# Patient Record
Sex: FEMALE | Race: BLACK OR AFRICAN AMERICAN | NOT HISPANIC OR LATINO | Employment: UNEMPLOYED | URBAN - METROPOLITAN AREA
[De-identification: names, ages, dates, MRNs, and addresses within clinical notes are randomized per-mention and may not be internally consistent; named-entity substitution may affect disease eponyms.]

---

## 2022-02-26 LAB
ALBUMIN: 3.5 G/DL (ref 3.4–5)
ALP BLD-CCNC: 109 U/L (ref 33–110)
ALT SERPL-CCNC: 15 U/L (ref 7–45)
ANION GAP SERPL CALCULATED.3IONS-SCNC: 13 MMOL/L (ref 10–20)
AST SERPL-CCNC: 14 U/L (ref 9–39)
BICARBONATE: 23 MMOL/L (ref 21–32)
BILIRUB SERPL-MCNC: 0.4 MG/DL (ref 0–1.2)
CALCIUM SERPL-MCNC: 9.7 MG/DL (ref 8.6–10.6)
CHLORIDE BLD-SCNC: 112 MMOL/L (ref 98–107)
CREAT SERPL-MCNC: 0.65 MG/DL (ref 0.5–1)
EGFR FEMALE: >90 ML/MIN/1.73M2
ERYTHROCYTE [DISTWIDTH] IN BLOOD BY AUTOMATED COUNT: 14 % (ref 11.5–14)
GLUCOSE: 99 MG/DL (ref 74–99)
HCT VFR BLD CALC: 29.6 % (ref 36–46)
HEMOGLOBIN: 8.5 G/DL (ref 12–16)
MCHC RBC AUTO-ENTMCNC: 28.7 G/DL (ref 32–36)
MCV RBC AUTO: 88 FL (ref 80–100)
NUCLEATED RBCS: 0 /100 WBC (ref 0–0)
PLATELET # BLD: 325 X10E9/L (ref 150–450)
POTASSIUM SERPL-SCNC: 4.1 MMOL/L (ref 3.5–5.3)
RBC # BLD: 3.35 X10E12/L (ref 4–5.2)
SODIUM BLD-SCNC: 144 MMOL/L (ref 136–145)
TOTAL PROTEIN: 6.2 G/DL (ref 6.4–8.2)
UREA NITROGEN: 16 MG/DL (ref 6–23)
WBC: 6.3 X10E9/L (ref 4.4–11.3)

## 2022-03-03 LAB
ANION GAP SERPL CALCULATED.3IONS-SCNC: 13 MMOL/L (ref 10–20)
BICARBONATE: 23 MMOL/L (ref 21–32)
CALCIUM SERPL-MCNC: 9.3 MG/DL (ref 8.6–10.3)
CHLORIDE BLD-SCNC: 109 MMOL/L (ref 98–107)
CREAT SERPL-MCNC: 0.82 MG/DL (ref 0.5–1)
EGFR FEMALE: 83 ML/MIN/1.73M2
ERYTHROCYTE [DISTWIDTH] IN BLOOD BY AUTOMATED COUNT: 14.5 % (ref 11.5–14)
GLUCOSE: 83 MG/DL (ref 74–99)
HCT VFR BLD CALC: 32.2 % (ref 36–46)
HEMOGLOBIN: 9.1 G/DL (ref 12–16)
MCHC RBC AUTO-ENTMCNC: 28.3 G/DL (ref 32–36)
MCV RBC AUTO: 87 FL (ref 80–100)
PLATELET # BLD: 273 X10E9/L (ref 150–450)
POTASSIUM SERPL-SCNC: 4.2 MMOL/L (ref 3.5–5.3)
RBC # BLD: 3.71 X10E12/L (ref 4–5.2)
SODIUM BLD-SCNC: 141 MMOL/L (ref 136–145)
UREA NITROGEN: 19 MG/DL (ref 6–23)
WBC: 5.6 X10E9/L (ref 4.4–11.3)

## 2023-01-23 PROBLEM — L50.8 CHRONIC URTICARIA: Status: ACTIVE | Noted: 2023-01-23

## 2023-01-23 PROBLEM — S82.872S: Status: ACTIVE | Noted: 2023-01-23

## 2023-01-23 PROBLEM — H52.03 HYPEROPIA OF BOTH EYES: Status: ACTIVE | Noted: 2023-01-23

## 2023-01-23 PROBLEM — M51.9 LUMBAR DISC DISEASE: Status: ACTIVE | Noted: 2023-01-23

## 2023-01-23 PROBLEM — M54.12 CERVICAL RADICULOPATHY: Status: ACTIVE | Noted: 2023-01-23

## 2023-01-23 PROBLEM — M89.8X1 CHRONIC SCAPULAR PAIN: Status: ACTIVE | Noted: 2023-01-23

## 2023-01-23 PROBLEM — F17.200 NICOTINE DEPENDENCE: Status: ACTIVE | Noted: 2023-01-23

## 2023-01-23 PROBLEM — G35 MULTIPLE SCLEROSIS (MULTI): Status: ACTIVE | Noted: 2023-01-23

## 2023-01-23 PROBLEM — M54.9 UPPER BACK PAIN ON LEFT SIDE: Status: ACTIVE | Noted: 2023-01-23

## 2023-01-23 PROBLEM — N30.00 ACUTE CYSTITIS: Status: ACTIVE | Noted: 2023-01-23

## 2023-01-23 PROBLEM — I63.532: Status: ACTIVE | Noted: 2023-01-23

## 2023-01-23 PROBLEM — R32 URINARY INCONTINENCE IN FEMALE: Status: ACTIVE | Noted: 2023-01-23

## 2023-01-23 PROBLEM — N39.0 UTI (URINARY TRACT INFECTION): Status: ACTIVE | Noted: 2023-01-23

## 2023-01-23 PROBLEM — I33.0: Status: ACTIVE | Noted: 2023-01-23

## 2023-01-23 PROBLEM — Z86.73 H/O: CVA (CEREBROVASCULAR ACCIDENT): Status: ACTIVE | Noted: 2023-01-23

## 2023-01-23 PROBLEM — M54.16 LUMBAR RADICULITIS: Status: ACTIVE | Noted: 2023-01-23

## 2023-01-23 PROBLEM — H52.02 HYPERMETROPIA OF LEFT EYE: Status: ACTIVE | Noted: 2023-01-23

## 2023-01-23 PROBLEM — E78.01 FAMILIAL HYPERCHOLESTEREMIA: Status: ACTIVE | Noted: 2023-01-23

## 2023-01-23 PROBLEM — F32.1 MAJOR DEPRESSIVE DISORDER, SINGLE EPISODE, MODERATE (MULTI): Status: ACTIVE | Noted: 2023-01-23

## 2023-01-23 PROBLEM — M54.2 NECK PAIN: Status: ACTIVE | Noted: 2023-01-23

## 2023-01-23 PROBLEM — T81.89XA SURGICAL WOUND, NON HEALING: Status: ACTIVE | Noted: 2023-01-23

## 2023-01-23 PROBLEM — F32.A DEPRESSION: Status: ACTIVE | Noted: 2023-01-23

## 2023-01-23 PROBLEM — R10.30 GROIN PAIN: Status: ACTIVE | Noted: 2023-01-23

## 2023-01-23 PROBLEM — S82.839D: Status: ACTIVE | Noted: 2023-01-23

## 2023-01-23 PROBLEM — M48.02 CERVICAL STENOSIS OF SPINAL CANAL: Status: ACTIVE | Noted: 2023-01-23

## 2023-01-23 PROBLEM — Z98.1 S/P CERVICAL SPINAL FUSION: Status: ACTIVE | Noted: 2023-01-23

## 2023-01-23 PROBLEM — S82.873D: Status: ACTIVE | Noted: 2023-01-23

## 2023-01-23 PROBLEM — R43.8 METALLIC TASTE: Status: ACTIVE | Noted: 2023-01-23

## 2023-01-23 PROBLEM — M79.662 PAIN OF LEFT CALF: Status: ACTIVE | Noted: 2023-01-23

## 2023-01-23 PROBLEM — G89.29 CHRONIC PAIN: Status: ACTIVE | Noted: 2023-01-23

## 2023-01-23 PROBLEM — R39.9 UTI SYMPTOMS: Status: ACTIVE | Noted: 2023-01-23

## 2023-01-23 PROBLEM — J34.2 NASAL SEPTAL DEVIATION: Status: ACTIVE | Noted: 2023-01-23

## 2023-01-23 PROBLEM — M43.02 CERVICAL SPONDYLOLYSIS: Status: ACTIVE | Noted: 2023-01-23

## 2023-01-23 PROBLEM — G47.9 SLEEP DIFFICULTIES: Status: ACTIVE | Noted: 2023-01-23

## 2023-01-23 PROBLEM — M25.60 JOINT STIFFNESS: Status: ACTIVE | Noted: 2023-01-23

## 2023-01-23 PROBLEM — F41.9 ANXIETY: Status: ACTIVE | Noted: 2023-01-23

## 2023-01-23 PROBLEM — G89.29 CHRONIC SCAPULAR PAIN: Status: ACTIVE | Noted: 2023-01-23

## 2023-01-23 PROBLEM — M79.671 RIGHT FOOT PAIN: Status: ACTIVE | Noted: 2023-01-23

## 2023-01-23 PROBLEM — M21.6X1 PRONATION OF BOTH FEET: Status: ACTIVE | Noted: 2023-01-23

## 2023-01-23 PROBLEM — R29.898 WEAKNESS OF BOTH LOWER EXTREMITIES: Status: ACTIVE | Noted: 2023-01-23

## 2023-01-23 PROBLEM — J34.3 HYPERTROPHY OF BOTH INFERIOR NASAL TURBINATES: Status: ACTIVE | Noted: 2023-01-23

## 2023-01-23 PROBLEM — R51.9 HEADACHE: Status: ACTIVE | Noted: 2023-01-23

## 2023-01-23 PROBLEM — R06.02 SHORTNESS OF BREATH: Status: ACTIVE | Noted: 2023-01-23

## 2023-01-23 PROBLEM — G47.00 INSOMNIA: Status: ACTIVE | Noted: 2023-01-23

## 2023-01-23 PROBLEM — M25.512 LEFT SHOULDER PAIN: Status: ACTIVE | Noted: 2023-01-23

## 2023-01-23 PROBLEM — G43.119 MIGRAINE WITH AURA, INTRACTABLE: Status: ACTIVE | Noted: 2023-01-23

## 2023-01-23 PROBLEM — B95.8: Status: ACTIVE | Noted: 2023-01-23

## 2023-01-23 PROBLEM — J30.2 SEASONAL ALLERGIC RHINITIS: Status: ACTIVE | Noted: 2023-01-23

## 2023-01-23 PROBLEM — J34.89 NASAL DRAINAGE: Status: ACTIVE | Noted: 2023-01-23

## 2023-01-23 PROBLEM — M21.6X2 PRONATION OF BOTH FEET: Status: ACTIVE | Noted: 2023-01-23

## 2023-01-23 PROBLEM — B37.0 THRUSH, ORAL: Status: ACTIVE | Noted: 2023-01-23

## 2023-01-23 PROBLEM — E78.49 FAMILIAL HYPERLIPIDEMIA: Status: ACTIVE | Noted: 2023-01-23

## 2023-01-23 PROBLEM — M75.00 FROZEN SHOULDER SYNDROME: Status: ACTIVE | Noted: 2023-01-23

## 2023-01-23 PROBLEM — E55.9 VITAMIN D DEFICIENCY: Status: ACTIVE | Noted: 2023-01-23

## 2023-01-23 PROBLEM — J45.909 ASTHMA (HHS-HCC): Status: ACTIVE | Noted: 2023-01-23

## 2023-01-23 PROBLEM — M50.00 CERVICAL DISC DISORDER WITH MYELOPATHY: Status: ACTIVE | Noted: 2023-01-23

## 2023-01-23 PROBLEM — R53.1 GENERALIZED WEAKNESS: Status: ACTIVE | Noted: 2023-01-23

## 2023-01-23 PROBLEM — R53.83 FATIGUE: Status: ACTIVE | Noted: 2023-01-23

## 2023-01-23 PROBLEM — J18.9 CAP (COMMUNITY ACQUIRED PNEUMONIA): Status: ACTIVE | Noted: 2023-01-23

## 2023-01-23 PROBLEM — R19.7 DIARRHEA, UNSPECIFIED: Status: ACTIVE | Noted: 2023-01-23

## 2023-01-23 PROBLEM — K59.09 CHRONIC CONSTIPATION: Status: ACTIVE | Noted: 2023-01-23

## 2023-01-23 PROBLEM — R07.9 CHEST PAIN: Status: ACTIVE | Noted: 2023-01-23

## 2023-01-23 PROBLEM — M40.202 CERVICAL KYPHOSIS: Status: ACTIVE | Noted: 2023-01-23

## 2023-01-23 PROBLEM — H04.129 DRY EYE: Status: ACTIVE | Noted: 2023-01-23

## 2023-01-23 PROBLEM — B02.39 OTHER HERPES ZOSTER EYE DISEASE: Status: ACTIVE | Noted: 2023-01-23

## 2023-01-23 PROBLEM — M25.572 LEFT ANKLE PAIN: Status: ACTIVE | Noted: 2023-01-23

## 2023-01-23 PROBLEM — J32.4 CHRONIC PANSINUSITIS: Status: ACTIVE | Noted: 2023-01-23

## 2023-01-23 PROBLEM — F31.9 BIPOLAR DEPRESSION (MULTI): Status: ACTIVE | Noted: 2023-01-23

## 2023-01-23 PROBLEM — M76.71 PERONEAL TENDINITIS OF RIGHT LOWER EXTREMITY: Status: ACTIVE | Noted: 2023-01-23

## 2023-01-23 RX ORDER — MINERAL OIL
1 ENEMA (ML) RECTAL DAILY
COMMUNITY
Start: 2018-09-13 | End: 2023-06-23

## 2023-01-23 RX ORDER — LEVETIRACETAM 500 MG/1
1 TABLET ORAL 2 TIMES DAILY
COMMUNITY
Start: 2020-08-13 | End: 2023-10-27 | Stop reason: SDUPTHER

## 2023-01-23 RX ORDER — ESCITALOPRAM OXALATE 10 MG/1
1 TABLET ORAL DAILY
COMMUNITY
Start: 2022-04-15 | End: 2023-04-27

## 2023-01-23 RX ORDER — SENNOSIDES 8.6 MG/1
1 TABLET ORAL AS NEEDED
COMMUNITY
Start: 2022-03-11

## 2023-01-23 RX ORDER — DOCUSATE SODIUM 100 MG/1
100 CAPSULE, LIQUID FILLED ORAL 2 TIMES DAILY
COMMUNITY
Start: 2021-11-30

## 2023-01-23 RX ORDER — DIMETHYL FUMARATE 240 MG/1
240 CAPSULE ORAL 2 TIMES DAILY
COMMUNITY
Start: 2022-03-01 | End: 2023-10-04 | Stop reason: WASHOUT

## 2023-01-23 RX ORDER — ASPIRIN 81 MG/1
TABLET ORAL
Status: ON HOLD | COMMUNITY
Start: 2022-02-22 | End: 2024-02-08 | Stop reason: SDUPTHER

## 2023-01-23 RX ORDER — FAMOTIDINE 20 MG/1
1 TABLET, FILM COATED ORAL EVERY 12 HOURS
COMMUNITY
Start: 2022-08-18 | End: 2023-10-27

## 2023-01-23 RX ORDER — TOPIRAMATE 100 MG/1
1 TABLET, FILM COATED ORAL 2 TIMES DAILY
COMMUNITY
Start: 2020-09-14 | End: 2023-11-10

## 2023-01-23 RX ORDER — NITROGLYCERIN 0.4 MG/1
0.4 TABLET SUBLINGUAL
COMMUNITY
Start: 2021-05-19

## 2023-01-23 RX ORDER — DM/P-EPHED/ACETAMINOPH/DOXYLAM 30-7.5/3
2 LIQUID (ML) ORAL
COMMUNITY
Start: 2021-11-01 | End: 2023-11-20 | Stop reason: ALTCHOICE

## 2023-01-23 RX ORDER — GABAPENTIN 400 MG/1
CAPSULE ORAL DAILY
COMMUNITY
Start: 2021-02-18 | End: 2023-04-27

## 2023-01-23 RX ORDER — BUDESONIDE AND FORMOTEROL FUMARATE DIHYDRATE 160; 4.5 UG/1; UG/1
2 AEROSOL RESPIRATORY (INHALATION) 2 TIMES DAILY
COMMUNITY
Start: 2021-10-29 | End: 2023-06-27 | Stop reason: ALTCHOICE

## 2023-01-23 RX ORDER — SUMATRIPTAN 50 MG/1
50 TABLET, FILM COATED ORAL ONCE AS NEEDED
COMMUNITY
End: 2023-10-30

## 2023-01-23 RX ORDER — ALBUTEROL SULFATE 90 UG/1
AEROSOL, METERED RESPIRATORY (INHALATION)
COMMUNITY
Start: 2021-06-04

## 2023-01-23 RX ORDER — ATORVASTATIN CALCIUM 80 MG/1
1 TABLET, FILM COATED ORAL NIGHTLY
COMMUNITY
Start: 2021-03-31 | End: 2023-06-27 | Stop reason: SDUPTHER

## 2023-01-23 RX ORDER — AZELASTINE 1 MG/ML
2 SPRAY, METERED NASAL 2 TIMES DAILY
COMMUNITY
Start: 2019-09-25

## 2023-03-07 ENCOUNTER — TELEMEDICINE (OUTPATIENT)
Dept: PRIMARY CARE | Facility: CLINIC | Age: 58
End: 2023-03-07
Payer: COMMERCIAL

## 2023-03-07 DIAGNOSIS — E66.9 CLASS 1 OBESITY WITHOUT SERIOUS COMORBIDITY WITH BODY MASS INDEX (BMI) OF 31.0 TO 31.9 IN ADULT, UNSPECIFIED OBESITY TYPE: Primary | ICD-10-CM

## 2023-03-07 PROCEDURE — 99213 OFFICE O/P EST LOW 20 MIN: CPT | Performed by: STUDENT IN AN ORGANIZED HEALTH CARE EDUCATION/TRAINING PROGRAM

## 2023-03-07 NOTE — PROGRESS NOTES
I reviewed with the resident the medical history and the resident’s findings on physical examination.  I discussed with the resident the patient’s diagnosis and concur with the treatment plan as documented in the resident note.     Jesenia Em MD

## 2023-03-07 NOTE — PROGRESS NOTES
The encounter below was completed via telephone communication during the declared COVID-19 emergency. Video telemedicine was attempted, but patient lacked appropriate connectivity for that modality. Consent for treatment and billing for this visit was obtained during the visit. Patient was appropriately identified using two identifiers.    Jennifer Reddy is a 56yo with prediabetes, Relapsing-Remitting MS (diagnosed 2010), LPCA Stroke in 03/2021 (on ASA), fibromyalgia, depression, history of substance use disorder (sober since November 2012), lumbosacral radiculitis, cervical disc displacement and cervical kyphosis, radiculopathy, spondylolysis, s/p cervical fusion with neurosurgery (Dr. Familia Stock) on 2/27/2020, GTC x2 on Keppra (Last in 07/2020), COVID in 05/22 who presents to the clinic for management of weight loss after initiation of ozempic. Occasional nausea but otherwise tolerating ozempic well. Denies diarrhea, emesis, constipation, abdominal pain.     REVIEW OF SYSTEMS:   No fevers, chills  No sores, ulcers, rashes, skin lesions  No HA, SZ, syncope, stroke, TIA  No CP, chest pressure  No cough, SOB  No ABD pain, vomiting. +Nausea   No urinary urgency, dysuria, urinary frequency  No BRBPR, melena, hematuria  No bleeding  No edema, no calf pain    PHYSICAL EXAMINATION: (telephone visit)  Gen: No acute distress  Neuro: Alert and oriented  Resp: Speaks in normal tone and phonating normally in full sentences without pauses  Psych: Mood and affect appropriate. Intact judgment and insight.    Assessment and Plan:    Jennifer Reddy is a 56yo with prediabetes, Relapsing-Remitting MS (diagnosed 2010), LPCA Stroke in 03/2021 (on ASA), fibromyalgia, depression, history of substance use disorder (sober since November 2012), lumbosacral radiculitis, cervical disc displacement and cervical kyphosis, radiculopathy, spondylolysis, s/p cervical fusion with neurosurgery (Dr. Familia Stock) on 2/27/2020, GTC x2 on  Keppra (Last in 07/2020), COVID in 05/22 who presents to the clinic for management of weight loss after initiation of ozempic.      #Obesity   #Prediabetes   -Rx GLP-1 agonist -Ozempic 1 mg once weekly. Plan to up titrate to max dose of 2.4 mg per protocol.  -Patient denies history of pancreatitis, personal family history of MTC, MEN 2, suicidal behavior or ideation.  -Discussed possible adverse effects. Patient agreeable to pursue treatment with semaglutide.  -Patient to continue lifestyle modification including diet and exercise.  -RTC in 4 weeks for follow up  -Labs: RFP      RTC in 4 weeks for follow up     Staffed with Dr. Manav Stokes MD  Family Medicine, PGY 2  [facility]

## 2023-03-13 DIAGNOSIS — E66.9 CLASS 1 OBESITY WITHOUT SERIOUS COMORBIDITY WITH BODY MASS INDEX (BMI) OF 31.0 TO 31.9 IN ADULT, UNSPECIFIED OBESITY TYPE: Primary | ICD-10-CM

## 2023-03-13 RX ORDER — SEMAGLUTIDE 1.34 MG/ML
1 INJECTION, SOLUTION SUBCUTANEOUS
COMMUNITY
Start: 2023-02-10 | End: 2023-03-13 | Stop reason: DRUGHIGH

## 2023-03-13 RX ORDER — SEMAGLUTIDE 1.34 MG/ML
1 INJECTION, SOLUTION SUBCUTANEOUS
Qty: 2 EACH | Refills: 12 | Status: SHIPPED | OUTPATIENT
Start: 2023-03-13 | End: 2023-10-27

## 2023-03-14 ENCOUNTER — LAB (OUTPATIENT)
Dept: LAB | Facility: LAB | Age: 58
End: 2023-03-14
Payer: COMMERCIAL

## 2023-03-14 ENCOUNTER — TELEPHONE (OUTPATIENT)
Dept: PRIMARY CARE | Facility: CLINIC | Age: 58
End: 2023-03-14

## 2023-03-14 DIAGNOSIS — E66.9 CLASS 1 OBESITY WITHOUT SERIOUS COMORBIDITY WITH BODY MASS INDEX (BMI) OF 31.0 TO 31.9 IN ADULT, UNSPECIFIED OBESITY TYPE: ICD-10-CM

## 2023-03-14 LAB
ALBUMIN (G/DL) IN SER/PLAS: 4.5 G/DL (ref 3.4–5)
ANION GAP IN SER/PLAS: 11 MMOL/L (ref 10–20)
CALCIUM (MG/DL) IN SER/PLAS: 9.7 MG/DL (ref 8.6–10.6)
CARBON DIOXIDE, TOTAL (MMOL/L) IN SER/PLAS: 25 MMOL/L (ref 21–32)
CHLORIDE (MMOL/L) IN SER/PLAS: 107 MMOL/L (ref 98–107)
CREATININE (MG/DL) IN SER/PLAS: 0.79 MG/DL (ref 0.5–1.05)
GFR FEMALE: 87 ML/MIN/1.73M2
GLUCOSE (MG/DL) IN SER/PLAS: 77 MG/DL (ref 74–99)
PHOSPHATE (MG/DL) IN SER/PLAS: 4.6 MG/DL (ref 2.5–4.9)
POTASSIUM (MMOL/L) IN SER/PLAS: 4 MMOL/L (ref 3.5–5.3)
SODIUM (MMOL/L) IN SER/PLAS: 139 MMOL/L (ref 136–145)
UREA NITROGEN (MG/DL) IN SER/PLAS: 18 MG/DL (ref 6–23)

## 2023-03-14 PROCEDURE — 80069 RENAL FUNCTION PANEL: CPT

## 2023-03-14 PROCEDURE — 36415 COLL VENOUS BLD VENIPUNCTURE: CPT

## 2023-03-14 NOTE — TELEPHONE ENCOUNTER
Please contact patient regarding prior authorization for Ozempic 1 MG. Contact patient when this is completed. FYI. Patient advised to tell you she completed her Lab work today. Thanks. Froilan

## 2023-03-22 ENCOUNTER — TELEPHONE (OUTPATIENT)
Dept: PRIMARY CARE | Facility: CLINIC | Age: 58
End: 2023-03-22
Payer: COMMERCIAL

## 2023-03-22 NOTE — TELEPHONE ENCOUNTER
----- Message from Joaquim Carlisle sent at 3/22/2023  2:30 PM EDT -----  Regarding: FW: Med Request  Patient called back and states that the pharmacy is still waiting on a prior authorization  ----- Message -----  From: Joaquim Carlisle  Sent: 3/20/2023   9:18 AM EDT  To: [facility] [address] [place] [address] Clinical Support Staff  Subject: Med Request                                      Patient states that her insurance and pharmacy on file is requesting a prior authorization for Ozempic 1mg. Patient states that she's all out

## 2023-03-24 NOTE — TELEPHONE ENCOUNTER
Called and spoke to patient regarding the Ozempic prior authorization. After speaking to the pharmacy and insurance, it was found that another medication would have to be ordered and tried first before Ozempic would be covered. Dr. Stokes made aware. Patient was informed that a different medication will be called to the pharmacy.

## 2023-03-31 ENCOUNTER — OFFICE VISIT (OUTPATIENT)
Dept: PRIMARY CARE | Facility: CLINIC | Age: 58
End: 2023-03-31
Payer: COMMERCIAL

## 2023-03-31 VITALS
OXYGEN SATURATION: 100 % | SYSTOLIC BLOOD PRESSURE: 139 MMHG | DIASTOLIC BLOOD PRESSURE: 84 MMHG | TEMPERATURE: 97.6 F | BODY MASS INDEX: 32.02 KG/M2 | WEIGHT: 204 LBS | HEIGHT: 67 IN | HEART RATE: 80 BPM

## 2023-03-31 DIAGNOSIS — E66.9 CLASS 1 OBESITY WITHOUT SERIOUS COMORBIDITY WITH BODY MASS INDEX (BMI) OF 31.0 TO 31.9 IN ADULT, UNSPECIFIED OBESITY TYPE: Primary | ICD-10-CM

## 2023-03-31 DIAGNOSIS — R73.03 PREDIABETES: ICD-10-CM

## 2023-03-31 PROCEDURE — 99213 OFFICE O/P EST LOW 20 MIN: CPT | Performed by: STUDENT IN AN ORGANIZED HEALTH CARE EDUCATION/TRAINING PROGRAM

## 2023-03-31 PROCEDURE — 3008F BODY MASS INDEX DOCD: CPT | Performed by: STUDENT IN AN ORGANIZED HEALTH CARE EDUCATION/TRAINING PROGRAM

## 2023-03-31 RX ORDER — METFORMIN HYDROCHLORIDE 500 MG/1
500 TABLET ORAL
Qty: 30 TABLET | Refills: 11 | Status: SHIPPED | OUTPATIENT
Start: 2023-03-31 | End: 2023-04-28

## 2023-03-31 ASSESSMENT — PAIN SCALES - GENERAL: PAINLEVEL: 8

## 2023-03-31 NOTE — PROGRESS NOTES
"Subjective   Patient ID: Una Gallegos is a 57 y.o. female who presents for Follow-up.    HPI     Remitting MS (diagnosed 2010), LPCA Stroke in 03/2021 (on ASA), fibromyalgia, depression, history of substance use disorder (sober since November 2012), lumbosacral radiculitis, cervical disc displacement and cervical kyphosis, radiculopathy, spondylolysis, s/p cervical fusion with neurosurgery (Dr. Familia Stock) on 2/27/2020, GTC x2 on Keppra (Last in 07/2020), COVID in 05/22 who presents to the clinic for management of weight loss after initiation of ozempic.  Unfortunately, Ozempic is not covered by insurance and prior authorization was denied.    Review of Systems  No fevers, chills  No sores, ulcers, rashes, skin lesions  No HA, SZ, syncope, stroke, TIA  No CP, chest pressure  No cough, SOB  No ABD pain, vomiting. +Nausea   No urinary urgency, dysuria, urinary frequency  No BRBPR, melena, hematuria  No bleeding  No edema, no calf pain    Objective   /84 (BP Location: Left arm, Patient Position: Sitting, BP Cuff Size: Adult)   Pulse 80   Temp 36.4 °C (97.6 °F) (Temporal)   Ht 1.702 m (5' 7\")   Wt 92.5 kg (204 lb)   SpO2 100%   BMI 31.95 kg/m²     Physical Exam  PHYSICAL EXAM:    - GENERAL: Alert and oriented x 3. No acute distress. Well-nourished.    - EYES: EOMI. Anicteric.    - HENT: Moist mucous membranes. No scleral icterus. No cervical lymphadenopathy.    - LUNGS: Clear to auscultation bilaterally. No accessory muscle use.    - CARDIOVASCULAR: Regular rate and rhythm. No murmur. No JVD.    - ABDOMEN: Soft, non-tender and non-distended. No palpable masses.    - EXTREMITIES: No edema. Non-tender.?SKIN: No rashes or lesions. Warm.    - NEUROLOGIC: No focal neurological deficits. CN II-XII grossly intact, but not individually tested.    - PSYCHIATRIC: Cooperative. Appropriate mood and affect.      Assessment/Plan     Jennifer Reddy is a 56yo with prediabetes, Relapsing-Remitting MS (diagnosed " Patient notified during appt. 2010), LPCA Stroke in 03/2021 (on ASA), fibromyalgia, depression, history of substance use disorder (sober since November 2012), lumbosacral radiculitis, cervical disc displacement and cervical kyphosis, radiculopathy, spondylolysis, s/p cervical fusion with neurosurgery (Dr. Familia Stock) on 2/27/2020, GTC x2 on Keppra (Last in 07/2020), COVID in 05/22 who presents to the clinic for management of weight loss.     #Obesity   #Prediabetes   -Previously on GLP-1 agonist -Ozempic 0.5 mg once weekly. Planned to up titrate to 1 mg on last visit but medication was not covered by insurance.  -Start Metformin   -Discussed possible adverse effects. Patient agreeable to pursue treatment with Metformin.  -Patient to continue lifestyle modification including diet and exercise.  -RTC in 6-8 weeks for follow up     Staffed with Dr. Manav Stokes MD  Family Medicine, PGY 2  [facility]

## 2023-04-04 DIAGNOSIS — F32.A DEPRESSION, UNSPECIFIED DEPRESSION TYPE: Primary | ICD-10-CM

## 2023-04-09 DIAGNOSIS — G35 MULTIPLE SCLEROSIS (MULTI): ICD-10-CM

## 2023-04-17 NOTE — PROGRESS NOTES
I saw and evaluated the patient. I personally obtained the key and critical portions of the history and physical exam or was physically present for key and critical portions performed by the resident/fellow. I reviewed the resident/fellow's documentation and discussed the patient with the resident/fellow. I agree with the resident/fellow's medical decision making as documented in the note.    Jesenia Em MD

## 2023-04-27 RX ORDER — ESCITALOPRAM OXALATE 10 MG/1
10 TABLET ORAL DAILY
Qty: 90 TABLET | Refills: 0 | Status: SHIPPED | OUTPATIENT
Start: 2023-04-27 | End: 2024-05-03

## 2023-04-27 RX ORDER — GABAPENTIN 400 MG/1
800 CAPSULE ORAL 3 TIMES DAILY
Qty: 360 CAPSULE | Refills: 0 | Status: SHIPPED | OUTPATIENT
Start: 2023-04-27 | End: 2023-04-28 | Stop reason: SDUPTHER

## 2023-04-28 DIAGNOSIS — E66.9 CLASS 1 OBESITY WITHOUT SERIOUS COMORBIDITY WITH BODY MASS INDEX (BMI) OF 31.0 TO 31.9 IN ADULT, UNSPECIFIED OBESITY TYPE: ICD-10-CM

## 2023-04-28 DIAGNOSIS — R73.03 PREDIABETES: ICD-10-CM

## 2023-04-28 RX ORDER — METFORMIN HYDROCHLORIDE 500 MG/1
500 TABLET ORAL
Qty: 30 TABLET | Refills: 11 | Status: SHIPPED | OUTPATIENT
Start: 2023-04-28 | End: 2024-05-03

## 2023-05-02 RX ORDER — GABAPENTIN 400 MG/1
800 CAPSULE ORAL 3 TIMES DAILY
Qty: 360 CAPSULE | Refills: 0 | Status: SHIPPED | OUTPATIENT
Start: 2023-05-02 | End: 2023-07-12

## 2023-06-12 ENCOUNTER — APPOINTMENT (OUTPATIENT)
Dept: PRIMARY CARE | Facility: CLINIC | Age: 58
End: 2023-06-12
Payer: COMMERCIAL

## 2023-06-21 DIAGNOSIS — K59.09 OTHER CONSTIPATION: ICD-10-CM

## 2023-06-22 DIAGNOSIS — J30.2 OTHER SEASONAL ALLERGIC RHINITIS: ICD-10-CM

## 2023-06-23 RX ORDER — MINERAL OIL
ENEMA (ML) RECTAL
Qty: 90 TABLET | Refills: 3 | Status: SHIPPED | OUTPATIENT
Start: 2023-06-23 | End: 2023-10-27

## 2023-06-23 RX ORDER — POLYETHYLENE GLYCOL 3350 17 G/17G
POWDER, FOR SOLUTION ORAL
Qty: 510 G | Refills: 3 | Status: SHIPPED | OUTPATIENT
Start: 2023-06-23 | End: 2023-12-28

## 2023-06-27 ENCOUNTER — TELEMEDICINE (OUTPATIENT)
Dept: PRIMARY CARE | Facility: CLINIC | Age: 58
End: 2023-06-27
Payer: COMMERCIAL

## 2023-06-27 DIAGNOSIS — Z86.73 H/O: CVA (CEREBROVASCULAR ACCIDENT): Primary | ICD-10-CM

## 2023-06-27 PROCEDURE — 99442 PR PHYS/QHP TELEPHONE EVALUATION 11-20 MIN: CPT | Performed by: STUDENT IN AN ORGANIZED HEALTH CARE EDUCATION/TRAINING PROGRAM

## 2023-06-27 RX ORDER — ATORVASTATIN CALCIUM 80 MG/1
80 TABLET, FILM COATED ORAL NIGHTLY
Qty: 90 TABLET | Refills: 3 | Status: SHIPPED | OUTPATIENT
Start: 2023-06-27

## 2023-06-27 NOTE — PROGRESS NOTES
Subjective   Patient ID: Una Gallegos is a 58 y.o. female who presents for No chief complaint on file..  HPI  Presents for a virtual visit for a medication concern  Patient states she received a message from [employer] stating her provider did not approve her atorvastatin medication  She typically follows with Dr. Stokes  She denies any other concerns for today  She has been on the statin medication for a while now and has been tolerating It well.    Review of Systems  ROS negative except for above mentioned.     Objective   There were no vitals taken for this visit.    Physical Exam  No Physical exam done to nature of virtual visit  Assessment/Plan   A 58 y o F with a PMH sig for prediabetes, Relapsing-Remitting MS (diagnosed 2010), LPCA Stroke in 03/2021 (on ASA), fibromyalgia, depression, history of substance use disorder (sober since November 2012), lumbosacral radiculitis, cervical disc displacement and cervical kyphosis, radiculopathy, spondylolysis, s/p cervical fusion with neurosurgery (Dr. Familia Stock) on 2/27/2020, GTC x2 on Keppra (Last in 07/2020), COVID in 05/22 who presents for a virtual visit.     #Hx of LPCA Stroke  -Atorvastatin 80 mg refilled and sent to pharmacy    RTC in 1 month for fu with Dr. Kike Harris MD  Family Medicine, PGY-3  Problem List Items Addressed This Visit    None

## 2023-06-28 NOTE — PROGRESS NOTES
I reviewed with the resident the medical history and the resident’s findings on physical examination.  I discussed with the resident the patient’s diagnosis and concur with the treatment plan as documented in the resident note.     Evette Arteaga MD

## 2023-07-17 ENCOUNTER — APPOINTMENT (OUTPATIENT)
Dept: LAB | Facility: LAB | Age: 58
End: 2023-07-17
Payer: COMMERCIAL

## 2023-07-17 LAB
ALANINE AMINOTRANSFERASE (SGPT) (U/L) IN SER/PLAS: 13 U/L (ref 7–45)
ALBUMIN (G/DL) IN SER/PLAS: 4.3 G/DL (ref 3.4–5)
ALKALINE PHOSPHATASE (U/L) IN SER/PLAS: 78 U/L (ref 33–110)
ASPARTATE AMINOTRANSFERASE (SGOT) (U/L) IN SER/PLAS: 14 U/L (ref 9–39)
BASOPHILS (10*3/UL) IN BLOOD BY AUTOMATED COUNT: 0.02 X10E9/L (ref 0–0.1)
BASOPHILS/100 LEUKOCYTES IN BLOOD BY AUTOMATED COUNT: 0.4 % (ref 0–2)
BILIRUBIN DIRECT (MG/DL) IN SER/PLAS: 0.1 MG/DL (ref 0–0.3)
BILIRUBIN TOTAL (MG/DL) IN SER/PLAS: 0.7 MG/DL (ref 0–1.2)
EOSINOPHILS (10*3/UL) IN BLOOD BY AUTOMATED COUNT: 0 X10E9/L (ref 0–0.7)
EOSINOPHILS/100 LEUKOCYTES IN BLOOD BY AUTOMATED COUNT: 0 % (ref 0–6)
ERYTHROCYTE DISTRIBUTION WIDTH (RATIO) BY AUTOMATED COUNT: 14 % (ref 11.5–14.5)
ERYTHROCYTE MEAN CORPUSCULAR HEMOGLOBIN CONCENTRATION (G/DL) BY AUTOMATED: 30 G/DL (ref 32–36)
ERYTHROCYTE MEAN CORPUSCULAR VOLUME (FL) BY AUTOMATED COUNT: 84 FL (ref 80–100)
ERYTHROCYTES (10*6/UL) IN BLOOD BY AUTOMATED COUNT: 4.53 X10E12/L (ref 4–5.2)
HEMATOCRIT (%) IN BLOOD BY AUTOMATED COUNT: 38 % (ref 36–46)
HEMOGLOBIN (G/DL) IN BLOOD: 11.4 G/DL (ref 12–16)
IMMATURE GRANULOCYTES/100 LEUKOCYTES IN BLOOD BY AUTOMATED COUNT: 0.4 % (ref 0–0.9)
LEUKOCYTES (10*3/UL) IN BLOOD BY AUTOMATED COUNT: 5.2 X10E9/L (ref 4.4–11.3)
LYMPHOCYTES (10*3/UL) IN BLOOD BY AUTOMATED COUNT: 0.81 X10E9/L (ref 1.2–4.8)
LYMPHOCYTES/100 LEUKOCYTES IN BLOOD BY AUTOMATED COUNT: 15.7 % (ref 13–44)
MONOCYTES (10*3/UL) IN BLOOD BY AUTOMATED COUNT: 0.19 X10E9/L (ref 0.1–1)
MONOCYTES/100 LEUKOCYTES IN BLOOD BY AUTOMATED COUNT: 3.7 % (ref 2–10)
NEUTROPHILS (10*3/UL) IN BLOOD BY AUTOMATED COUNT: 4.12 X10E9/L (ref 1.2–7.7)
NEUTROPHILS/100 LEUKOCYTES IN BLOOD BY AUTOMATED COUNT: 79.8 % (ref 40–80)
NRBC (PER 100 WBCS) BY AUTOMATED COUNT: 0 /100 WBC (ref 0–0)
PLATELETS (10*3/UL) IN BLOOD AUTOMATED COUNT: 262 X10E9/L (ref 150–450)
PROTEIN TOTAL: 6.8 G/DL (ref 6.4–8.2)

## 2023-08-18 DIAGNOSIS — G35 MULTIPLE SCLEROSIS (MULTI): ICD-10-CM

## 2023-08-22 RX ORDER — GABAPENTIN 400 MG/1
800 CAPSULE ORAL 3 TIMES DAILY
Qty: 180 CAPSULE | Refills: 0 | Status: SHIPPED | OUTPATIENT
Start: 2023-08-22 | End: 2024-01-11 | Stop reason: SDUPTHER

## 2023-10-02 ENCOUNTER — APPOINTMENT (OUTPATIENT)
Dept: RADIOLOGY | Facility: CLINIC | Age: 58
End: 2023-10-02
Payer: COMMERCIAL

## 2023-10-03 ENCOUNTER — SPECIALTY PHARMACY (OUTPATIENT)
Dept: PHARMACY | Facility: CLINIC | Age: 58
End: 2023-10-03

## 2023-10-03 ENCOUNTER — PHARMACY VISIT (OUTPATIENT)
Dept: PHARMACY | Facility: CLINIC | Age: 58
End: 2023-10-03
Payer: MEDICAID

## 2023-10-03 PROCEDURE — RXMED WILLOW AMBULATORY MEDICATION CHARGE

## 2023-10-03 NOTE — PROGRESS NOTES
Dspoke with the patient and set delivery for he dimethyl fumureta for 10/05/23  to [id number] [address]  [facility] [address]

## 2023-10-05 ENCOUNTER — SPECIALTY PHARMACY (OUTPATIENT)
Dept: PHARMACY | Facility: CLINIC | Age: 58
End: 2023-10-05

## 2023-10-05 RX ORDER — METOPROLOL SUCCINATE 50 MG/1
50 TABLET, EXTENDED RELEASE ORAL DAILY
COMMUNITY

## 2023-10-05 NOTE — PROGRESS NOTES
[facility] Clinical Note    Una Gallegos is a 58 y.o. female, who is on the specialty pharmacy service for management of: Multiple Sclerosis Core with status of: (Enrolled)     Una was contacted on 10/5/2023.    Refer to the encounter summary report for documentation details about patient counseling and education.      Medication Adherence  The importance of adherence was discussed with the patient and they were advised to take the medication as prescribed by their provider. Una was encouraged to call her physician's office if they have a question regarding a missed dose.        Patient advised to contact the pharmacy if there are any changes to her medication list, including prescriptions, OTC medications, herbal products, or supplements. Patient was advised of [facility]’s dispensing process, refill timeline, contact information ([phone]), and patient management follow up. Patient confirmed understanding of education conducted during assessment. All patient questions and concerns were addressed to the best of my ability. Patient was encouraged to contact the specialty pharmacy with any questions or concerns.    Confirmed follow-up outreaches are properly scheduled. Reviewed goals of therapy in the program targets.    Elmo Hill, PharmD, BCPS, MSCS  Clinical Pharmacy Specialist- Neurology/MS  [facility]  Phone: [phone]  Fax: [phone]

## 2023-10-06 ENCOUNTER — APPOINTMENT (OUTPATIENT)
Dept: PRIMARY CARE | Facility: CLINIC | Age: 58
End: 2023-10-06
Payer: COMMERCIAL

## 2023-10-19 DIAGNOSIS — E66.9 CLASS 1 OBESITY WITHOUT SERIOUS COMORBIDITY WITH BODY MASS INDEX (BMI) OF 31.0 TO 31.9 IN ADULT, UNSPECIFIED OBESITY TYPE: Primary | ICD-10-CM

## 2023-10-19 DIAGNOSIS — R73.03 PREDIABETES: ICD-10-CM

## 2023-10-20 ENCOUNTER — TELEPHONE (OUTPATIENT)
Dept: PRIMARY CARE | Facility: CLINIC | Age: 58
End: 2023-10-20
Payer: COMMERCIAL

## 2023-10-20 NOTE — TELEPHONE ENCOUNTER
Patient called and stated that she went to urgent care and they said she had lupus, so she wants to know could you place a order for blood work just to verify if she has lupus or not. Please give the patient a callback at the number on file.

## 2023-10-23 ENCOUNTER — LAB (OUTPATIENT)
Dept: LAB | Facility: LAB | Age: 58
End: 2023-10-23
Payer: COMMERCIAL

## 2023-10-23 DIAGNOSIS — E66.9 CLASS 1 OBESITY WITHOUT SERIOUS COMORBIDITY WITH BODY MASS INDEX (BMI) OF 31.0 TO 31.9 IN ADULT, UNSPECIFIED OBESITY TYPE: ICD-10-CM

## 2023-10-23 DIAGNOSIS — R73.03 PREDIABETES: ICD-10-CM

## 2023-10-23 LAB
ALBUMIN SERPL BCP-MCNC: 4.2 G/DL (ref 3.4–5)
ALP SERPL-CCNC: 89 U/L (ref 33–110)
ALT SERPL W P-5'-P-CCNC: 21 U/L (ref 7–45)
ANION GAP SERPL CALC-SCNC: 12 MMOL/L (ref 10–20)
AST SERPL W P-5'-P-CCNC: 13 U/L (ref 9–39)
BILIRUB SERPL-MCNC: 0.5 MG/DL (ref 0–1.2)
BUN SERPL-MCNC: 15 MG/DL (ref 6–23)
CALCIUM SERPL-MCNC: 9.3 MG/DL (ref 8.6–10.6)
CHLORIDE SERPL-SCNC: 110 MMOL/L (ref 98–107)
CHOLEST SERPL-MCNC: 181 MG/DL (ref 0–199)
CHOLESTEROL/HDL RATIO: 3.1
CO2 SERPL-SCNC: 23 MMOL/L (ref 21–32)
CREAT SERPL-MCNC: 0.76 MG/DL (ref 0.5–1.05)
EST. AVERAGE GLUCOSE BLD GHB EST-MCNC: 126 MG/DL
GFR SERPL CREATININE-BSD FRML MDRD: >90 ML/MIN/1.73M*2
GLUCOSE SERPL-MCNC: 85 MG/DL (ref 74–99)
HBA1C MFR BLD: 6 %
HDLC SERPL-MCNC: 59 MG/DL
LDLC SERPL CALC-MCNC: 101 MG/DL
NON HDL CHOLESTEROL: 122 MG/DL (ref 0–149)
POTASSIUM SERPL-SCNC: 4 MMOL/L (ref 3.5–5.3)
PROT SERPL-MCNC: 6.6 G/DL (ref 6.4–8.2)
SODIUM SERPL-SCNC: 141 MMOL/L (ref 136–145)
TRIGL SERPL-MCNC: 104 MG/DL (ref 0–149)
VLDL: 21 MG/DL (ref 0–40)

## 2023-10-23 PROCEDURE — 80053 COMPREHEN METABOLIC PANEL: CPT

## 2023-10-23 PROCEDURE — 80061 LIPID PANEL: CPT

## 2023-10-23 PROCEDURE — 36415 COLL VENOUS BLD VENIPUNCTURE: CPT

## 2023-10-23 PROCEDURE — 83036 HEMOGLOBIN GLYCOSYLATED A1C: CPT

## 2023-10-27 ENCOUNTER — OFFICE VISIT (OUTPATIENT)
Dept: PRIMARY CARE | Facility: CLINIC | Age: 58
End: 2023-10-27

## 2023-10-27 VITALS
HEIGHT: 67 IN | DIASTOLIC BLOOD PRESSURE: 64 MMHG | BODY MASS INDEX: 30.92 KG/M2 | TEMPERATURE: 96.1 F | SYSTOLIC BLOOD PRESSURE: 118 MMHG | HEART RATE: 100 BPM | WEIGHT: 197 LBS | RESPIRATION RATE: 18 BRPM | OXYGEN SATURATION: 100 %

## 2023-10-27 DIAGNOSIS — G40.909 SEIZURE DISORDER (MULTI): ICD-10-CM

## 2023-10-27 DIAGNOSIS — H53.8 BLURRY VISION: ICD-10-CM

## 2023-10-27 DIAGNOSIS — M79.7 FIBROMYALGIA: Primary | ICD-10-CM

## 2023-10-27 DIAGNOSIS — Z98.1 S/P CERVICAL SPINAL FUSION: ICD-10-CM

## 2023-10-27 RX ORDER — LEVETIRACETAM 500 MG/1
500 TABLET ORAL 2 TIMES DAILY
Qty: 180 TABLET | Refills: 0 | Status: SHIPPED | OUTPATIENT
Start: 2023-10-27 | End: 2024-01-30

## 2023-10-27 RX ORDER — OXYCODONE AND ACETAMINOPHEN 5; 325 MG/1; MG/1
1 TABLET ORAL DAILY
COMMUNITY
End: 2024-02-08 | Stop reason: HOSPADM

## 2023-10-27 ASSESSMENT — PAIN SCALES - GENERAL: PAINLEVEL: 8

## 2023-10-27 NOTE — PROGRESS NOTES
"Subjective   Patient ID: Una Gallegos is a 58 y.o. female who presents for Establish Care (Physical, and blood work results), Rash (Upper arms, chest, and back), and Spasms.    HPI     57 yo F w/ Relapsing-Remitting MS (diagnosed 2010), LPCA Stroke in 03/2021 (on ASA), fibromyalgia, depression, history of substance use disorder (sober since November 2012), lumbosacral radiculitis, cervical disc displacement and cervical kyphosis, radiculopathy, spondylolysis, s/p cervical fusion with neurosurgery (Dr. Familia Stock) on 2/27/2020, GTC x2 on Keppra (Last in 07/2020), COVID in 05/22 who presents to the clinic for evaluation of possible SLE. Symptoms have been present for 4 months. Onset was gradual.  Symptoms include arthritis, nausea, and headaches and are of 7 / 10 severity.  Symptoms are made worse by: overhead work and sitting.  Symptoms are helped by cold, heat, OTC pain medications (Ibuprofen), and Percocet.  Associated symptoms include arthralgia, fatigue, joint pain, morning stiffness, and nausea. Patient denies associated alopecia, bleeding/clotting problems, fevers, new headache, nodules, oral ulcers, palpitations, pleurisy, and polydypsia. Patient is not currently taking the following medications associate with SLE type syndrome.     Review of Systems    A 12-point review of systems was completed and is otherwise negative except as noted in the HPI.      Objective   /64 (BP Location: Left arm, Patient Position: Sitting, BP Cuff Size: Adult)   Pulse 100   Temp 35.6 °C (96.1 °F) (Temporal)   Resp 18   Ht 1.702 m (5' 7\")   Wt 89.4 kg (197 lb)   SpO2 100%   BMI 30.85 kg/m²     Physical Exam    PHYSICAL EXAM:    - GENERAL: Alert and oriented x 3. No acute distress. Well-nourished.    - EYES: EOMI. Anicteric.    - HENT: Moist mucous membranes. No scleral icterus. No cervical lymphadenopathy.    - LUNGS: Clear to auscultation bilaterally. No accessory muscle use.    - CARDIOVASCULAR: Regular rate " and rhythm. No murmur. No JVD.    - ABDOMEN: Soft, non-tender and non-distended. No palpable masses.    - EXTREMITIES: No edema. Non-tender.    - SKIN: No rashes or lesions. Warm.    - NEUROLOGIC: No focal neurological deficits. CN II-XII grossly intact, but not individually tested.    - PSYCHIATRIC: Cooperative. Appropriate mood and affect.    Assessment/Plan     Jennifer Reddy is a 56yo with prediabetes, Relapsing-Remitting MS (diagnosed 2010), LPCA Stroke in 03/2021 (on ASA), fibromyalgia, depression, history of substance use disorder (sober since November 2012), lumbosacral radiculitis, cervical disc displacement and cervical kyphosis, radiculopathy, spondylolysis, s/p cervical fusion with neurosurgery (Dr. Familia Stock) on 2/27/2020, GTC x2 on Keppra (Last in 07/2020), COVID in 05/22 who presents to the clinic for evaluation of possible SLE.  Patient was recently seen at urgent care center and had blood work done which showed positive GENOVEVA.  However RF, dsDNA, anti-Sm and all other antibodies were negative.  Recent CBC stable.  Low suspicion for SLE at this time.  Joint pain likely in the setting of known fibromyalgia and significant history of cervical, and lumbar sacral procedures.  Provided referral to ophthalmology given blurry vision.  Provided referral to pain management and rheumatology for further evaluation and management.  Rx sent for Keppra.    Return to clinic in 3 months for follow-up or earlier as needed    Patient discussed with attending physician Dr. Rashad Stokes MD  Family Medicine, PGY-3    This note was completed using Dragon voice recognition technology and may include unintended errors with respect to translation of words, typographical errors or grammar errors which may not have been identified while finalizing the chart.

## 2023-10-30 NOTE — PROGRESS NOTES
I reviewed the resident/fellow's documentation and discussed the patient with the resident/fellow. I agree with the resident/fellow's medical decision making as documented in the note.    Abdulaziz Solorzano MD

## 2023-11-05 ENCOUNTER — PHARMACY VISIT (OUTPATIENT)
Dept: PHARMACY | Facility: CLINIC | Age: 58
End: 2023-11-05
Payer: MEDICAID

## 2023-11-05 ENCOUNTER — SPECIALTY PHARMACY (OUTPATIENT)
Dept: PHARMACY | Facility: CLINIC | Age: 58
End: 2023-11-05

## 2023-11-05 PROCEDURE — RXMED WILLOW AMBULATORY MEDICATION CHARGE

## 2023-11-07 ENCOUNTER — APPOINTMENT (OUTPATIENT)
Dept: RADIOLOGY | Facility: HOSPITAL | Age: 58
End: 2023-11-07
Payer: COMMERCIAL

## 2023-11-08 ENCOUNTER — APPOINTMENT (OUTPATIENT)
Dept: OPHTHALMOLOGY | Facility: CLINIC | Age: 58
End: 2023-11-08
Payer: COMMERCIAL

## 2023-11-08 DIAGNOSIS — G89.29 OTHER CHRONIC PAIN: ICD-10-CM

## 2023-11-09 ENCOUNTER — OFFICE VISIT (OUTPATIENT)
Dept: PAIN MEDICINE | Facility: CLINIC | Age: 58
End: 2023-11-09
Payer: COMMERCIAL

## 2023-11-09 VITALS
RESPIRATION RATE: 17 BRPM | BODY MASS INDEX: 30.76 KG/M2 | HEIGHT: 67 IN | DIASTOLIC BLOOD PRESSURE: 80 MMHG | HEART RATE: 83 BPM | SYSTOLIC BLOOD PRESSURE: 129 MMHG | WEIGHT: 196 LBS

## 2023-11-09 DIAGNOSIS — M54.12 CERVICAL RADICULOPATHY: Primary | ICD-10-CM

## 2023-11-09 PROCEDURE — 3008F BODY MASS INDEX DOCD: CPT | Performed by: PAIN MEDICINE

## 2023-11-09 PROCEDURE — 99213 OFFICE O/P EST LOW 20 MIN: CPT | Performed by: PAIN MEDICINE

## 2023-11-09 SDOH — SOCIAL STABILITY: SOCIAL NETWORK: SOCIAL ACTIVITY:: 7

## 2023-11-09 ASSESSMENT — PAIN SCALES - GENERAL
PAINLEVEL: 10-WORST PAIN EVER
PAINLEVEL_OUTOF10: 10 - WORST POSSIBLE PAIN

## 2023-11-09 ASSESSMENT — PAIN - FUNCTIONAL ASSESSMENT: PAIN_FUNCTIONAL_ASSESSMENT: 0-10

## 2023-11-09 ASSESSMENT — PAIN DESCRIPTION - DESCRIPTORS: DESCRIPTORS: ACHING;BURNING;RADIATING

## 2023-11-09 NOTE — PROGRESS NOTES
Subjective   Patient ID: Una Gallegos is a 58 y.o. female who presents for No chief complaint on file..    HPI:   Patient with history of C4-C6 laminectomy and fusion, multiple sclerosis, and SLE presents to the clinic as a follow-up for neck and back pain. She was last seen in the office in May for neck pain that radiates to her right elbow. She is still experiencing these symptoms with pins and needles sensation. She denies any weakness in her right upper extremity. She has some weakness in her left hand due to MS. Her main concern at this visit is for a sharp, stabbing pain on the right side of her mid back along her bra strap and beneath her shoulder blade. The pain has been bothering her for a few months. It worsens with sitting upright for long periods of time. She is unable to stand erect and has to lean forward to find a comfortable position. When she wakes up in the morning, she does not have any pain in this area. It develops in the early afternoon. Sometimes it is so severe that she feels like she cannot breathe. It does not radiate around to the front. She has done physical therapy in the past and continues to do the learned exercises at home. She has been going to an urgent care on a weekly basis to receive IV infusions of steroid, Nubain, and toradol. She also received a percocet prescription from this urgent care. The infusions provide her with mild relief when her pain is severe, but she does not have relief with percocet.       Review of Systems   13-point ROS done and negative except for HPI.       Current Outpatient Medications:     albuterol 90 mcg/actuation inhaler, Inhale., Disp: , Rfl:     amantadine (Symmetrel) 100 mg tablet, TAKE 1 TABLET BY MOUTH TWICE A MULTIPLE SCLEROSIS, Disp: 60 tablet, Rfl: 0    aspirin 81 mg EC tablet, Take by mouth., Disp: , Rfl:     atorvastatin (Lipitor) 80 mg tablet, Take 1 tablet (80 mg) by mouth once daily at bedtime., Disp: 90 tablet, Rfl: 3    azelastine  (Astelin) 137 mcg (0.1 %) nasal spray, Administer 2 sprays into affected nostril(s) in the morning and 2 sprays before bedtime., Disp: , Rfl:     dimethyl fumarate (Tecfidera) 240 mg capsule,delayed release(DR/EC) capsule, TAKE ONE (1) CAPSULE BY MOUTH TWICE DAILY. SWALLOW WHOLE. DO NOT OPEN CAPSULE. DO NOT CRUSH OR CHEW., Disp: 180 capsule, Rfl: 1    docusate sodium (Colace) 100 mg capsule, Take 1 capsule (100 mg) by mouth in the morning and 1 capsule (100 mg) in the evening., Disp: , Rfl:     escitalopram (Lexapro) 10 mg tablet, Take 1 tablet (10 mg) by mouth once daily., Disp: 90 tablet, Rfl: 0    gabapentin (Neurontin) 400 mg capsule, TAKE 2 CAPSULES (800 MG) BY MOUTH 3 TIMES A DAY., Disp: 180 capsule, Rfl: 0    levETIRAcetam (Keppra) 500 mg tablet, Take 1 tablet (500 mg) by mouth 2 times a day., Disp: 180 tablet, Rfl: 0    metFORMIN (Glucophage) 500 mg tablet, TAKE 1 TABLET (500 MG) BY MOUTH ONCE DAILY WITH A MEAL., Disp: 30 tablet, Rfl: 11    metoprolol succinate XL (Toprol-XL) 50 mg 24 hr tablet, Take 50 mg by mouth once daily. Do not crush or chew., Disp: , Rfl:     mv-min/folic/vit K/lycop/coQ10 (DAILY MULTIVITAMIN ORAL), Take 1 tablet by mouth in the morning., Disp: , Rfl:     nicotine polacrilex (Commit) 2 mg lozenge, Dissolve 1 lozenge (2 mg) in the mouth. As directed., Disp: , Rfl:     nitroglycerin (Nitrostat) 0.4 mg SL tablet, Place 1 tablet (0.4 mg) under the tongue. Every 5 minutes for up to 3 doses as needed for chest pain. Call 911 if pain persists., Disp: , Rfl:     oxyCODONE-acetaminophen (Percocet) 5-325 mg tablet, Take 1 tablet by mouth once daily., Disp: , Rfl:     polyethylene glycol (Glycolax) 17 gram/dose powder, MIX 1 CAPFUL(17GM) IN 8 OUNCES OF WATER, JUICE, OR TEASPOONFUL AND DRINK DAILY., Disp: 510 g, Rfl: 3    sennosides (Senokot) 8.6 mg tablet, Take 1 tablet (8.6 mg) by mouth if needed for constipation., Disp: , Rfl:     sodium chloride (Ayr) 0.65 % nasal drops, Administer 1 spray  into affected nostril(s) in the morning and at bedtime., Disp: , Rfl:     SUMAtriptan (Imitrex) 50 mg tablet, TAKE 1 TABLET AT HEADACHE ONSET. MAY REPEAT IN 2 HOURS. DO NOT REPEAT MORE THAN 2 TIMES IN ONE WEEK., Disp: 9 tablet, Rfl: 0    topiramate (Topamax) 100 mg tablet, Take 1 tablet (100 mg) by mouth in the morning and 1 tablet (100 mg) before bedtime., Disp: , Rfl:      Past Medical History:   Diagnosis Date    Acute candidiasis of vulva and vagina 2015    Yeast vaginitis    Acute maxillary sinusitis, unspecified 2020    Acute maxillary sinusitis, recurrence not specified    Acute recurrent sinusitis, unspecified 2018    Recurrent acute sinusitis    Other specified disorders of synovium and tendon, other site 2014    Peroneal tendinosis    Pain in unspecified ankle and joints of unspecified foot 2014    Ankle pain    Personal history of other diseases of the musculoskeletal system and connective tissue     Personal history of fibromyalgia    Personal history of other diseases of the respiratory system 2015    History of sinusitis    Personal history of other infectious and parasitic diseases 03/15/2015    History of Clostridioides difficile colitis    Personal history of other mental and behavioral disorders     History of depression    Personal history of other specified conditions     History of nausea    Personal history of other specified conditions 2022    History of headache    Personal history of other specified conditions 2020    History of seizure    Personal history of pneumonia (recurrent) 2015    History of pneumonia    Spasmodic torticollis 2021    Cervical dystonia        Past Surgical History:   Procedure Laterality Date    APPENDECTOMY  2014    Appendectomy     SECTION, CLASSIC  2014     Section    CT HEAD ANGIO W AND WO IV CONTRAST  12/10/2019    [place] HEAD ANGIO W AND [place] [facility] [place]  [facility]    CT HEAD ANGIO W AND WO IV CONTRAST  2022    CT HEAD ANGIO W AND WO IV CONTRAST 2022 [facility] [place] [facility]CILL[facility]    CT NECK ANGIO W AND WO IV CONTRAST  12/10/2019    CT NECK ANGIO W AND WO IV CONTRAST 12/10/2019 [facility] [place] [facility]GAC[facility]    CT NECK ANGIO W AND WO IV CONTRAST  2022    CT NECK ANGIO W AND WO IV CONTRAST 2022 [facility] [place] [facility]CILL[facility] LEGACY    HYSTERECTOMY  10/05/2015    Hysterectomy    MR HEAD ANGIO WO IV CONTRAST  2020    MR HEAD ANGIO WO IV CONTRAST 2020 [facility] [place] [facility]    MR HEAD ANGIO WO IV CONTRAST  2021    MR HEAD ANGIO WO IV CONTRAST 2021 [facility] [place] [facility]    MR NECK ANGIO WO IV CONTRAST  2020    MR NECK ANGIO WO IV CONTRAST 2020 [facility] [place] [facility]    MR NECK ANGIO WO IV CONTRAST  2021    MR NECK ANGIO WO IV CONTRAST 2021 [facility] [place] [facility]    OTHER SURGICAL HISTORY  2014     Hysterectomy        Family History   Problem Relation Name Age of Onset    Hypertension Mother      Cancer Father      Eating disorder Sister      Other (hoarding behavior) Sister      Lupus Mother's Sister          Systemic    Rheum arthritis Other Aunt         No Known Allergies     Objective     There were no vitals filed for this visit.     Physical Exam      General: NAD, well groomed, well nourished  Eyes: Non-icteric sclera, EOMI  Ears, Nose, Mouth, and Throat: External ears and nose appear to be without deformity or rash. No lesions or masses noted. Hearing is grossly intact.   Neck: Trachea midline  Respiratory: Nonlabored breathing   Cardiovascular: No peripheral edema   Skin: No rashes or open lesions/ulcers identified on skin.  Extremity: Increased pain in right shoulder with abduction. Decreased range of motion in right shoulder.     Back:   Palpation: Tenderness to palpation over cervical paraspinous muscles. Tenderness to palpation over mid-thoracic paraspinous muscles on the right.     Neurologic:   Cranial nerves grossly intact.    Strength: 5/5 and symmetric throughout.   Sensation: Normal to light touch throughout.  D[place]e:  Absent  [place]ling: Absent     Psychiatric: Alert, orientation to person, place, and time. Cooperative.      Assessment/Plan   Una Gallegos is a 58 year old female with history of C4-C6 laminectomy and fusion, multiple sclerosis, and SLE presents to the clinic as a follow-up for neck and back pain. History and exam of her new upper back pain is consistent with musculoskeletal pain. She was instructed to continue stretching and exercising her back as well as using a massager to help relieve the pain. Her neck and right upper extremity symptoms are consistent with cervical radiculopathy. She has benefited from cervical epidural steroid injections in the past.     Plan:  - Cervical epidural steroid injection     The patient was invited to contact us back anytime with any questions or concerns and follow-up with us in the office as needed.

## 2023-11-10 RX ORDER — TOPIRAMATE 100 MG/1
100 TABLET, FILM COATED ORAL 2 TIMES DAILY
Qty: 180 TABLET | Refills: 1 | Status: SHIPPED | OUTPATIENT
Start: 2023-11-10 | End: 2024-04-15

## 2023-11-17 ENCOUNTER — HOSPITAL ENCOUNTER (EMERGENCY)
Facility: HOSPITAL | Age: 58
Discharge: HOME | End: 2023-11-17
Attending: EMERGENCY MEDICINE
Payer: COMMERCIAL

## 2023-11-17 VITALS
HEART RATE: 78 BPM | RESPIRATION RATE: 16 BRPM | DIASTOLIC BLOOD PRESSURE: 77 MMHG | WEIGHT: 194 LBS | SYSTOLIC BLOOD PRESSURE: 135 MMHG | OXYGEN SATURATION: 98 % | BODY MASS INDEX: 30.45 KG/M2 | HEIGHT: 67 IN | TEMPERATURE: 97.9 F

## 2023-11-17 DIAGNOSIS — G35 MULTIPLE SCLEROSIS EXACERBATION (MULTI): Primary | ICD-10-CM

## 2023-11-17 LAB
ALBUMIN SERPL BCP-MCNC: 4.2 G/DL (ref 3.4–5)
ALP SERPL-CCNC: 70 U/L (ref 33–110)
ALT SERPL W P-5'-P-CCNC: 16 U/L (ref 7–45)
ANION GAP SERPL CALC-SCNC: 15 MMOL/L (ref 10–20)
AST SERPL W P-5'-P-CCNC: 11 U/L (ref 9–39)
BASOPHILS # BLD AUTO: 0.02 X10*3/UL (ref 0–0.1)
BASOPHILS NFR BLD AUTO: 0.1 %
BILIRUB SERPL-MCNC: 0.6 MG/DL (ref 0–1.2)
BUN SERPL-MCNC: 16 MG/DL (ref 6–23)
CALCIUM SERPL-MCNC: 9.2 MG/DL (ref 8.6–10.6)
CHLORIDE SERPL-SCNC: 110 MMOL/L (ref 98–107)
CO2 SERPL-SCNC: 21 MMOL/L (ref 21–32)
CREAT SERPL-MCNC: 0.94 MG/DL (ref 0.5–1.05)
EOSINOPHIL # BLD AUTO: 0.01 X10*3/UL (ref 0–0.7)
EOSINOPHIL NFR BLD AUTO: 0.1 %
ERYTHROCYTE [DISTWIDTH] IN BLOOD BY AUTOMATED COUNT: 14.2 % (ref 11.5–14.5)
GFR SERPL CREATININE-BSD FRML MDRD: 70 ML/MIN/1.73M*2
GLUCOSE SERPL-MCNC: 90 MG/DL (ref 74–99)
HCT VFR BLD AUTO: 38 % (ref 36–46)
HGB BLD-MCNC: 11.6 G/DL (ref 12–16)
IMM GRANULOCYTES # BLD AUTO: 0.06 X10*3/UL (ref 0–0.7)
IMM GRANULOCYTES NFR BLD AUTO: 0.4 % (ref 0–0.9)
LYMPHOCYTES # BLD AUTO: 2.88 X10*3/UL (ref 1.2–4.8)
LYMPHOCYTES NFR BLD AUTO: 21.1 %
MCH RBC QN AUTO: 25.8 PG (ref 26–34)
MCHC RBC AUTO-ENTMCNC: 30.5 G/DL (ref 32–36)
MCV RBC AUTO: 85 FL (ref 80–100)
MONOCYTES # BLD AUTO: 1.55 X10*3/UL (ref 0.1–1)
MONOCYTES NFR BLD AUTO: 11.4 %
NEUTROPHILS # BLD AUTO: 9.1 X10*3/UL (ref 1.2–7.7)
NEUTROPHILS NFR BLD AUTO: 66.9 %
NRBC BLD-RTO: 0 /100 WBCS (ref 0–0)
PLATELET # BLD AUTO: 244 X10*3/UL (ref 150–450)
POTASSIUM SERPL-SCNC: 3.5 MMOL/L (ref 3.5–5.3)
PROT SERPL-MCNC: 6.7 G/DL (ref 6.4–8.2)
RBC # BLD AUTO: 4.49 X10*6/UL (ref 4–5.2)
SODIUM SERPL-SCNC: 142 MMOL/L (ref 136–145)
WBC # BLD AUTO: 13.6 X10*3/UL (ref 4.4–11.3)

## 2023-11-17 PROCEDURE — 36415 COLL VENOUS BLD VENIPUNCTURE: CPT | Performed by: EMERGENCY MEDICINE

## 2023-11-17 PROCEDURE — 99285 EMERGENCY DEPT VISIT HI MDM: CPT | Mod: 25 | Performed by: EMERGENCY MEDICINE

## 2023-11-17 PROCEDURE — 99284 EMERGENCY DEPT VISIT MOD MDM: CPT | Mod: 25

## 2023-11-17 PROCEDURE — 80053 COMPREHEN METABOLIC PANEL: CPT | Performed by: EMERGENCY MEDICINE

## 2023-11-17 PROCEDURE — 2500000004 HC RX 250 GENERAL PHARMACY W/ HCPCS (ALT 636 FOR OP/ED): Mod: SE | Performed by: STUDENT IN AN ORGANIZED HEALTH CARE EDUCATION/TRAINING PROGRAM

## 2023-11-17 PROCEDURE — 99284 EMERGENCY DEPT VISIT MOD MDM: CPT | Performed by: EMERGENCY MEDICINE

## 2023-11-17 PROCEDURE — 85025 COMPLETE CBC W/AUTO DIFF WBC: CPT | Performed by: EMERGENCY MEDICINE

## 2023-11-17 PROCEDURE — 2500000004 HC RX 250 GENERAL PHARMACY W/ HCPCS (ALT 636 FOR OP/ED): Mod: SE

## 2023-11-17 PROCEDURE — 96365 THER/PROPH/DIAG IV INF INIT: CPT

## 2023-11-17 PROCEDURE — 96375 TX/PRO/DX INJ NEW DRUG ADDON: CPT

## 2023-11-17 RX ORDER — ACETAMINOPHEN 325 MG/1
975 TABLET ORAL ONCE
Status: COMPLETED | OUTPATIENT
Start: 2023-11-17 | End: 2023-11-17

## 2023-11-17 RX ORDER — METHYLPREDNISOLONE 4 MG/1
TABLET ORAL
Qty: 21 TABLET | Refills: 0 | Status: SHIPPED | OUTPATIENT
Start: 2023-11-17 | End: 2023-12-01 | Stop reason: ALTCHOICE

## 2023-11-17 RX ORDER — NALBUPHINE HYDROCHLORIDE 20 MG/ML
10 INJECTION, SOLUTION INTRAMUSCULAR; INTRAVENOUS; SUBCUTANEOUS ONCE
Status: DISCONTINUED | OUTPATIENT
Start: 2023-11-17 | End: 2023-11-17

## 2023-11-17 RX ORDER — KETOROLAC TROMETHAMINE 15 MG/ML
15 INJECTION, SOLUTION INTRAMUSCULAR; INTRAVENOUS ONCE
Status: COMPLETED | OUTPATIENT
Start: 2023-11-17 | End: 2023-11-17

## 2023-11-17 RX ADMIN — ACETAMINOPHEN 975 MG: 325 TABLET ORAL at 18:45

## 2023-11-17 RX ADMIN — KETOROLAC TROMETHAMINE 15 MG: 15 INJECTION, SOLUTION INTRAMUSCULAR; INTRAVENOUS at 18:45

## 2023-11-17 ASSESSMENT — PAIN - FUNCTIONAL ASSESSMENT: PAIN_FUNCTIONAL_ASSESSMENT: 0-10

## 2023-11-17 ASSESSMENT — PAIN SCALES - GENERAL: PAINLEVEL_OUTOF10: 9

## 2023-11-17 NOTE — ED PROVIDER NOTES
CC: MS flare     HPI:  Patient is a 58-year-old female with history of relapsing remitting MS, CVA, seizure disorder, migraines, bipolar disorder, and fibromyalgia, presenting to the ED with concern for MS flare.  Patient states that she is having worsening pain in her right shoulder arm and into her back.  States that its been going on for multiple months but has gotten worse and she is having tingling in her arm now.  States she is scheduled for an cervical spinal injection with pain management.  States she feels like she is in an MS flare.  States has been going to urgent care for steroids and analgesia.  Denies any new weakness or numbness.  No recent fevers or chills.  No chest pain or shortness of breath.    Limitations to History: None    Additional History Obtained from: none    Records Reviewed:  Recent available ED and inpatient notes reviewed in EMR.    PMHx/PSHx:  Per HPI.   - has a past medical history of Acute candidiasis of vulva and vagina (2015), Acute maxillary sinusitis, unspecified (2020), Acute recurrent sinusitis, unspecified (2018), Other specified disorders of synovium and tendon, other site (2014), Pain in unspecified ankle and joints of unspecified foot (2014), Personal history of other diseases of the musculoskeletal system and connective tissue, Personal history of other diseases of the respiratory system (2015), Personal history of other infectious and parasitic diseases (03/15/2015), Personal history of other mental and behavioral disorders, Personal history of other specified conditions, Personal history of other specified conditions (2022), Personal history of other specified conditions (2020), Personal history of pneumonia (recurrent) (2015), and Spasmodic torticollis (2021).  - has a past surgical history that includes Other surgical history (2014);  section, classic (2014); Appendectomy (2014);  Hysterectomy (10/05/2015); CT angio head w and wo IV contrast (12/10/2019); CT angio neck (12/10/2019); MR angio head wo IV contrast (8/26/2020); MR angio neck wo IV contrast (8/26/2020); MR angio head wo IV contrast (9/21/2021); MR angio neck wo IV contrast (9/21/2021); CT angio head w and wo IV contrast (1/13/2022); and CT angio neck (1/13/2022).    Medications:  Reviewed in EMR. See EMR for complete list of medications and doses.    Allergies:  Patient has no known allergies.    Social History:  - Tobacco:  reports that she has been smoking cigarettes. She has never used smokeless tobacco.   - Alcohol:  reports no history of alcohol use.   - Illicit Drugs:  reports no history of drug use.     ROS:  Per HPI.     ???????????????????????????????????????????????????????????????  Triage Vitals:  T 35.8 °C (96.5 °F)    /61  RR 18  O2 99 % None (Room air)    PHYSICAL EXAM:   VS: As documented in the triage note and EMR flowsheet from this visit were reviewed.  Gen: Well developed. Well nourished.  Appears comfortable.  Eyes: PERRL, EOMI. Clear scerla.  HENT: NC/AT, Mucosal membranes moist.   Neck: Supple, no cervical LAD  Resp: Non-labored breathing on RA, CTAB, no wheezes or crackles  CV: RRR, nl S1, S2, no murmurs  Abd: Soft, non-distended, non-tender, no rebound or guarding  Ext: no LE edema, pulses full and equal  Skin: WWP. No systemic rashes or lesions.  MSK: normal muscle bulk, no obvious joint swelling in extremities. TTP over right trap with muscle spasm.   Back: no C/T/L spine TTP step offs or deformities.   Neuro:  AAOx3 to person, place, and time. Attention span, concentration, comprehension, and naming normal. Follows commands. Visual fields grossly intact. No facial droop. Facial movement and sensation intact. Uvula midline. Tongue protrudes midline, strong shoulder shrug and head turning against resistance.  5/5 strength at proximal and distal muscles in BUE and BLE. Sensation grossly  intact to light touch in BUE and BLE. Finger-to-nose  testing intact without dysmetria or overshoot.   Psych: Maintains eye contact, Appropriate mood and affect  ???????????????????????????????????????????????????????????????    ED Labs/Imaging:   Labs Reviewed   COMPREHENSIVE METABOLIC PANEL - Abnormal       Result Value    Glucose 90      Sodium 142      Potassium 3.5      Chloride 110 (*)     Bicarbonate 21      Anion Gap 15      Urea Nitrogen 16      Creatinine 0.94      eGFR 70      Calcium 9.2      Albumin 4.2      Alkaline Phosphatase 70      Total Protein 6.7      AST 11      Bilirubin, Total 0.6      ALT 16     CBC WITH AUTO DIFFERENTIAL - Abnormal    WBC 13.6 (*)     nRBC 0.0      RBC 4.49      Hemoglobin 11.6 (*)     Hematocrit 38.0      MCV 85      MCH 25.8 (*)     MCHC 30.5 (*)     RDW 14.2      Platelets 244      Neutrophils % 66.9      Immature Granulocytes %, Automated 0.4      Lymphocytes % 21.1      Monocytes % 11.4      Eosinophils % 0.1      Basophils % 0.1      Neutrophils Absolute 9.10 (*)     Immature Granulocytes Absolute, Automated 0.06      Lymphocytes Absolute 2.88      Monocytes Absolute 1.55 (*)     Eosinophils Absolute 0.01      Basophils Absolute 0.02       No orders to display         ED Course & MDM   Diagnoses as of 11/19/23 0956   Multiple sclerosis exacerbation (CMS/[facility])           Medical Decision Making  This is a 58-year-old female with history of relapsing remitting multiple sclerosis, chronic pain and fibromyalgia, presenting to the ED due to pain and right upper extremity tingling x1 day.  Patient is hemodynamically stable and not in acute distress.  Afebrile.  Neuro exam completely intact including full strength and sensation in bilateral lower extremities.  No midline spinal tenderness or step-off deformities.  No red flag signs symptoms including no fevers immunocompromise state including no chronic steroid use and no history of cancer.  Given this do not suspect epidural  abscess, cauda equina, compression fracture or spinal compression.  No urinary symptoms to suggest nephrolithiasis or pyelonephritis and no isolated CVA tenderness. Do not suspect gynecologic pathology such as PID or ectopic pregnancy.  Symptoms likely secondary to multiple sclerosis versus flareup of her chronic pain vs musculoskeletal.  Patient was treated with multimodal pain regimen as well as steroids.  Patient has close outpatient follow-up including with neurology which was stressed is important given may be a MS flare.  Did review patient's most recent MRI which is not consistent with MS flare.  Patient agreeable to plan and was discharged home in stable condition with course of steroids.      Social Determinants Limiting Care:  None identified    Disposition:  Discharge home.     Patient seen and discussed with attending physician.    Genny Louis MD PGY3  Emergency Medicine      Procedures ? [employer] last updated 11/19/2023 9:56 AM          Genny Louis MD  Resident  11/19/23 [id number]

## 2023-11-17 NOTE — ED TRIAGE NOTES
Back pain, arm pain, neck pain with tingling down right arm. Pt has MS and feels like her MS flare.

## 2023-11-20 ENCOUNTER — OFFICE VISIT (OUTPATIENT)
Dept: NEUROLOGY | Facility: CLINIC | Age: 58
End: 2023-11-20
Payer: COMMERCIAL

## 2023-11-20 VITALS
SYSTOLIC BLOOD PRESSURE: 119 MMHG | HEIGHT: 67 IN | HEART RATE: 99 BPM | WEIGHT: 193 LBS | BODY MASS INDEX: 30.29 KG/M2 | DIASTOLIC BLOOD PRESSURE: 78 MMHG

## 2023-11-20 DIAGNOSIS — G35 MULTIPLE SCLEROSIS (MULTI): Primary | ICD-10-CM

## 2023-11-20 PROCEDURE — 99215 OFFICE O/P EST HI 40 MIN: CPT | Performed by: NURSE PRACTITIONER

## 2023-11-20 PROCEDURE — 3008F BODY MASS INDEX DOCD: CPT | Performed by: NURSE PRACTITIONER

## 2023-11-20 ASSESSMENT — VISUAL ACUITY: VA_NORMAL: 1

## 2023-11-20 ASSESSMENT — PAIN SCALES - GENERAL: PAINLEVEL: 8

## 2023-11-20 NOTE — PROGRESS NOTES
Patient name: Una Gallegos  Diagnosis if known: RRMS  Date of onset: 2003  Date of diagnosis: 2010  disease course at onset: RR  disease course now: RR  Current DMT: DMF  Previous DMTs: Tecfidera, Copaxone  Last MRI brain: 9/2023- stable.  Last MRI cervical: 11/3/17- stable  Last MRI thoracic: 11/3/17- stable  Last OCT:  CSF: 8/2014    Subjective   Una Gallegos is a 58 y.o.  female here for a follow up of her MS, she takes DMF and tolerating well. She reports no new MS symptoms. Last MRI of the brain 9/2023 and was stable. She is suffering with cervical and lumber pain, d/t spinal stenosis.  She has been to urgent care several times and received IV pain meds.  She is schedule to get pain injection Dec. 1 2023.  She is also being seen by her PCP for pain management until spinal injection.    ROS  No bowel or bladder issues, stable gait, no recent infections, and no trouble swallowing.    Objective   Neurological Exam  Mental Status  Alert. Oriented to person, place, time and situation. Oriented to person, place, and time. Recent and remote memory are intact. Speech is normal. Language is fluent with no aphasia. Attention and concentration are normal.    Cranial Nerves  CN II: Visual acuity is normal. Visual fields full to confrontation.  CN III, IV, VI: Extraocular movements intact bilaterally. Normal lids and orbits bilaterally.  CN V: Facial sensation is normal.  CN VII: Full and symmetric facial movement.  CN VIII: Hearing is normal.  CN IX, X: Palate elevates symmetrically  CN XI: Shoulder shrug strength is normal.  CN XII: Tongue midline without atrophy or fasciculations.    Motor   Strength is 5/5 throughout all four extremities.    Sensory  Light touch is normal in upper and lower extremities.     Reflexes                                            Right                      Left  Brachioradialis                    2+                         2+  Biceps                                 2+                          2+  Triceps                                2+                         2+  Patellar                                2+                         2+   Right palmar grasp present. Left palmar grasp present.    Coordination    Finger-to-nose, rapid alternating movements and heel-to-shin normal bilaterally without dysmetria.    Gait  Normal casual, toe, heel and tandem gait.    Physical Exam  Constitutional:       Appearance: Normal appearance.   HENT:      Head: Normocephalic.      Right Ear: Tympanic membrane normal.      Left Ear: Tympanic membrane normal.      Nose: Nose normal.      Mouth/Throat:      Mouth: Mucous membranes are moist.   Eyes:      General: Lids are normal.      Extraocular Movements: Extraocular movements intact.   Cardiovascular:      Rate and Rhythm: Normal rate.   Pulmonary:      Effort: Pulmonary effort is normal.   Abdominal:      General: Abdomen is flat.   Musculoskeletal:         General: Normal range of motion.      Cervical back: Full passive range of motion without pain and normal range of motion.   Skin:     General: Skin is warm and dry.   Neurological:      Mental Status: She is alert and oriented to person, place, and time.      Motor: Motor strength is normal.     Coordination: Coordination is intact.      Deep Tendon Reflexes:      Reflex Scores:       Tricep reflexes are 2+ on the right side and 2+ on the left side.       Bicep reflexes are 2+ on the right side and 2+ on the left side.       Brachioradialis reflexes are 2+ on the right side and 2+ on the left side.       Patellar reflexes are 2+ on the right side and 2+ on the left side.  Psychiatric:         Attention and Perception: Attention normal.         Mood and Affect: Mood normal.         Speech: Speech normal.         Behavior: Behavior normal. Behavior is cooperative.         Cognition and Memory: Cognition normal.     Provider Impression  Una Gallegos is a 58 y.o. female here for a follow up of her MS, she  takes DMF and tolerating well. She reports no new MS symptoms. Last MRI of the brain 9/2023 and was stable. She will have spinal injection for pain Dec. 1, 2023.  Instructed her to follow up with the surgeon who did her cervical infusion before COVID 19.    We discussed the importance of living healthy life style with diet and exercise and the importance of V-D in patient's with MS.    The total face to face appointment was 45 minutes and more than 50% of the visit was spent counseling and coordination of care.    I personally reviewed laboratory, radiographic, and medical studies which were pertinent for today's visit.    Plan  - Continue DMF.  - MRI 9/2024.  - Follow up with spinal surgeon.  - Follow up here in 6 mo.

## 2023-11-21 ENCOUNTER — APPOINTMENT (OUTPATIENT)
Dept: RADIOLOGY | Facility: HOSPITAL | Age: 58
End: 2023-11-21
Payer: COMMERCIAL

## 2023-11-30 ENCOUNTER — PHARMACY VISIT (OUTPATIENT)
Dept: PHARMACY | Facility: CLINIC | Age: 58
End: 2023-11-30
Payer: MEDICAID

## 2023-11-30 ENCOUNTER — HOSPITAL ENCOUNTER (EMERGENCY)
Facility: HOSPITAL | Age: 58
Discharge: HOME | End: 2023-11-30
Attending: EMERGENCY MEDICINE
Payer: COMMERCIAL

## 2023-11-30 ENCOUNTER — APPOINTMENT (OUTPATIENT)
Dept: RADIOLOGY | Facility: HOSPITAL | Age: 58
End: 2023-11-30
Payer: COMMERCIAL

## 2023-11-30 VITALS
HEART RATE: 95 BPM | SYSTOLIC BLOOD PRESSURE: 120 MMHG | DIASTOLIC BLOOD PRESSURE: 70 MMHG | TEMPERATURE: 97.8 F | WEIGHT: 195 LBS | OXYGEN SATURATION: 94 % | BODY MASS INDEX: 30.61 KG/M2 | RESPIRATION RATE: 18 BRPM | HEIGHT: 67 IN

## 2023-11-30 DIAGNOSIS — M62.838 NECK MUSCLE SPASM: Primary | ICD-10-CM

## 2023-11-30 DIAGNOSIS — G43.119 MIGRAINE WITH AURA, INTRACTABLE, WITHOUT STATUS MIGRAINOSUS: ICD-10-CM

## 2023-11-30 LAB
ALBUMIN SERPL BCP-MCNC: 4.3 G/DL (ref 3.4–5)
ALP SERPL-CCNC: 65 U/L (ref 33–110)
ALT SERPL W P-5'-P-CCNC: 19 U/L (ref 7–45)
ANION GAP SERPL CALC-SCNC: 12 MMOL/L (ref 10–20)
APPEARANCE UR: CLEAR
AST SERPL W P-5'-P-CCNC: 21 U/L (ref 9–39)
BASOPHILS # BLD AUTO: 0.02 X10*3/UL (ref 0–0.1)
BASOPHILS NFR BLD AUTO: 0.2 %
BILIRUB SERPL-MCNC: 0.7 MG/DL (ref 0–1.2)
BILIRUB UR STRIP.AUTO-MCNC: NEGATIVE MG/DL
BUN SERPL-MCNC: 17 MG/DL (ref 6–23)
CALCIUM SERPL-MCNC: 9.3 MG/DL (ref 8.6–10.3)
CHLORIDE SERPL-SCNC: 110 MMOL/L (ref 98–107)
CO2 SERPL-SCNC: 22 MMOL/L (ref 21–32)
COLOR UR: YELLOW
CREAT SERPL-MCNC: 0.89 MG/DL (ref 0.5–1.05)
EOSINOPHIL # BLD AUTO: 0.01 X10*3/UL (ref 0–0.7)
EOSINOPHIL NFR BLD AUTO: 0.1 %
ERYTHROCYTE [DISTWIDTH] IN BLOOD BY AUTOMATED COUNT: 14.3 % (ref 11.5–14.5)
GFR SERPL CREATININE-BSD FRML MDRD: 75 ML/MIN/1.73M*2
GLUCOSE SERPL-MCNC: 76 MG/DL (ref 74–99)
GLUCOSE UR STRIP.AUTO-MCNC: NEGATIVE MG/DL
HCT VFR BLD AUTO: 40.4 % (ref 36–46)
HGB BLD-MCNC: 11.9 G/DL (ref 12–16)
IMM GRANULOCYTES # BLD AUTO: 0.05 X10*3/UL (ref 0–0.7)
IMM GRANULOCYTES NFR BLD AUTO: 0.4 % (ref 0–0.9)
KETONES UR STRIP.AUTO-MCNC: NEGATIVE MG/DL
LEUKOCYTE ESTERASE UR QL STRIP.AUTO: NEGATIVE
LYMPHOCYTES # BLD AUTO: 1.48 X10*3/UL (ref 1.2–4.8)
LYMPHOCYTES NFR BLD AUTO: 11.3 %
MCH RBC QN AUTO: 25.6 PG (ref 26–34)
MCHC RBC AUTO-ENTMCNC: 29.5 G/DL (ref 32–36)
MCV RBC AUTO: 87 FL (ref 80–100)
MONOCYTES # BLD AUTO: 0.9 X10*3/UL (ref 0.1–1)
MONOCYTES NFR BLD AUTO: 6.8 %
NEUTROPHILS # BLD AUTO: 10.68 X10*3/UL (ref 1.2–7.7)
NEUTROPHILS NFR BLD AUTO: 81.2 %
NITRITE UR QL STRIP.AUTO: NEGATIVE
NRBC BLD-RTO: 0 /100 WBCS (ref 0–0)
PH UR STRIP.AUTO: 5 [PH]
PLATELET # BLD AUTO: 265 X10*3/UL (ref 150–450)
POTASSIUM SERPL-SCNC: 4.4 MMOL/L (ref 3.5–5.3)
PROT SERPL-MCNC: 7.1 G/DL (ref 6.4–8.2)
PROT UR STRIP.AUTO-MCNC: NEGATIVE MG/DL
RBC # BLD AUTO: 4.64 X10*6/UL (ref 4–5.2)
RBC # UR STRIP.AUTO: NEGATIVE /UL
SODIUM SERPL-SCNC: 140 MMOL/L (ref 136–145)
SP GR UR STRIP.AUTO: 1.01
UROBILINOGEN UR STRIP.AUTO-MCNC: <2 MG/DL
WBC # BLD AUTO: 13.1 X10*3/UL (ref 4.4–11.3)

## 2023-11-30 PROCEDURE — 72128 CT CHEST SPINE W/O DYE: CPT | Mod: FOREIGN READ | Performed by: RADIOLOGY

## 2023-11-30 PROCEDURE — 72131 CT LUMBAR SPINE W/O DYE: CPT | Mod: FR

## 2023-11-30 PROCEDURE — 99285 EMERGENCY DEPT VISIT HI MDM: CPT | Mod: 25 | Performed by: EMERGENCY MEDICINE

## 2023-11-30 PROCEDURE — 80053 COMPREHEN METABOLIC PANEL: CPT | Performed by: PHYSICIAN ASSISTANT

## 2023-11-30 PROCEDURE — 36415 COLL VENOUS BLD VENIPUNCTURE: CPT | Performed by: PHYSICIAN ASSISTANT

## 2023-11-30 PROCEDURE — 72125 CT NECK SPINE W/O DYE: CPT

## 2023-11-30 PROCEDURE — 85025 COMPLETE CBC W/AUTO DIFF WBC: CPT | Performed by: PHYSICIAN ASSISTANT

## 2023-11-30 PROCEDURE — 96374 THER/PROPH/DIAG INJ IV PUSH: CPT

## 2023-11-30 PROCEDURE — 2500000004 HC RX 250 GENERAL PHARMACY W/ HCPCS (ALT 636 FOR OP/ED): Performed by: PHYSICIAN ASSISTANT

## 2023-11-30 PROCEDURE — 72131 CT LUMBAR SPINE W/O DYE: CPT | Mod: FOREIGN READ | Performed by: RADIOLOGY

## 2023-11-30 PROCEDURE — 81003 URINALYSIS AUTO W/O SCOPE: CPT | Performed by: PHYSICIAN ASSISTANT

## 2023-11-30 PROCEDURE — RXMED WILLOW AMBULATORY MEDICATION CHARGE

## 2023-11-30 PROCEDURE — 72125 CT NECK SPINE W/O DYE: CPT | Performed by: RADIOLOGY

## 2023-11-30 PROCEDURE — 72128 CT CHEST SPINE W/O DYE: CPT

## 2023-11-30 RX ORDER — MORPHINE SULFATE 4 MG/ML
4 INJECTION, SOLUTION INTRAMUSCULAR; INTRAVENOUS ONCE
Status: COMPLETED | OUTPATIENT
Start: 2023-11-30 | End: 2023-11-30

## 2023-11-30 RX ORDER — SUMATRIPTAN 50 MG/1
TABLET, FILM COATED ORAL
Qty: 9 TABLET | Refills: 0 | OUTPATIENT
Start: 2023-11-30

## 2023-11-30 RX ADMIN — MORPHINE SULFATE 4 MG: 4 INJECTION, SOLUTION INTRAMUSCULAR; INTRAVENOUS at 12:25

## 2023-11-30 ASSESSMENT — PAIN SCALES - GENERAL
PAINLEVEL_OUTOF10: 9
PAINLEVEL_OUTOF10: 9
PAINLEVEL_OUTOF10: 4

## 2023-11-30 ASSESSMENT — COLUMBIA-SUICIDE SEVERITY RATING SCALE - C-SSRS
2. HAVE YOU ACTUALLY HAD ANY THOUGHTS OF KILLING YOURSELF?: NO
6. HAVE YOU EVER DONE ANYTHING, STARTED TO DO ANYTHING, OR PREPARED TO DO ANYTHING TO END YOUR LIFE?: NO
1. IN THE PAST MONTH, HAVE YOU WISHED YOU WERE DEAD OR WISHED YOU COULD GO TO SLEEP AND NOT WAKE UP?: NO

## 2023-11-30 ASSESSMENT — PAIN - FUNCTIONAL ASSESSMENT
PAIN_FUNCTIONAL_ASSESSMENT: 0-10
PAIN_FUNCTIONAL_ASSESSMENT: 0-10

## 2023-11-30 NOTE — ED TRIAGE NOTES
"As provider-in-triage, I performed a medical screening history and physical exam on this patient.    HISTORY OF PRESENT ILLNESS:  58-year-old female presents today with chief complaint of right-sided neck pain rating to her back.  Patient reports she has a history of cervical fusions at C4-C6.  Patient also reports a history of MS.  She states that she feels like this is affecting her MS and causing her to have instability in her gait.    Vital Signs reviewed:  Heart Rate:  [91]   Temp:  [36.6 °C (97.8 °F)]   BP: (133)/(75)   Height:  [170.2 cm (5' 7\")]   Weight:  [88.5 kg (195 lb)]   SpO2:  [100 %]     BRIEF PHYSICAL EXAM:  Patient sitting in flex position with head turned to the left to avoid pain on right side of neck.  Fuhs tenderness over right side cervical paraspinal muscles.  Cardiac regular rate rhythm no murmur. Lungs clear bilaterally. Abdomen soft nontender.      MDM:  CT imaging of spine, basic labs, urinalysis    I evaluated this patient in triage with the RN. Due to the patients complaint labs and or imaging were ordered by myself in an attempt to expedite patient care however I am not participating in care after evaluation. This is a preliminary assessment. Pt does not appear in acute distress at this time. They are stable and will have a full evaluation as soon as possible. They will be cared for by another provider who will possibly order more labs, imaging or interventions. Pt did not have a full ROS or PE completed by myself.         "

## 2023-11-30 NOTE — ED TRIAGE NOTES
PT TO ED FROM HOME WITH C/O RIGHT SIDED NECK PAIN RADIATING DOWN HER BACK TO LEG THAT STARTED LAST NIGHT WITH NO KNOWN INJURY.

## 2023-11-30 NOTE — ED PROVIDER NOTES
Limitations to History: None  Additional History Obtained from: None    HPI:    Patient is presenting to the emergency department due to neck pain.  Patient has a history of MS and cervical stenosis as well as chronic pain related to the above.  She states that her family noted on Thanksgiving that she was tilting her head to the left.  She states that she has been having increasing pain with difficulty moving her neck to the right.  She follows with neurology for MS and last saw them 2 weeks ago who stated that her symptoms regarding her MS are stable and felt like this was more related to her cervical stenosis.  She has a follow-up appointment with Dr. Stock of [facility] in January.  Has a follow-up appointment tomorrow with pain management for an epidural spinal injection for her thoracic pain.  She states her pain that she is currently having feels more located in her upper back with radiation down.  Denies any loss of bowel or bladder control.  Review of systems is otherwise negative.  Patient was seen and evaluated in the [facility] triage system    ------------------------------------------------------------------------------------------------------------------------------------------  Physical Exam:    ED Triage Vitals   Temp Heart Rate Resp BP   11/30/23 1047 11/30/23 1047 11/30/23 1221 11/30/23 1047   36.6 °C (97.8 °F) 91 20 133/75      SpO2 Temp src Heart Rate Source Patient Position   11/30/23 1047 -- 11/30/23 1221 11/30/23 1221   100 %  Monitor Sitting      BP Location FiO2 (%)     11/30/23 1221 --     Left arm         VS: As documented in the triage note and EMR flowsheet from this visit were reviewed.  General: Well appearing. No acute distress.   Eyes: Pupils round and reactive. No scleral icterus. No conjunctival injection  HENT: No midline tenderness to palpation, moist mucous membranes, normal cephalic atraumatic, head turned to the left, decreased active range of motion, full passive range of motion  in flexion/extension, turning left and right   'CV: RRR, No MRG. No pedal edema appreciated.  Resp: Clear to auscultation bilaterally. Non-labored.    GI: Soft, nontender to palpation. Nondistended. No guarding, rigidity or rebound  Skin: Warm, dry, intact. No systemic rashes or lesions appreciated.  Extremities: No deformities or pain out of proportion; pulses intact   Neuro: Awake, alert, moving extremities at baseline per patient, no focal motor or sensory deficit, head tilted to the left, tenderness to palpation over paraspinal musculature over C-spine and trapezius on the right  Psych: Appropriate. Kempt.    ------------------------------------------------------------------------------------------------------------------------------------------    Medical Decision Making  Patient is presenting to the emergency department due to neck discomfort.  Has a history of multiple sclerosis and states that this feels slightly different than her previous flares.  Does have a history of chronic pain, follows with pain management, has an appointment tomorrow for an epidural injection, has seen her neurologist with the last 2 weeks he felt like this was unrelated to her multiple sclerosis of belly feels more related to her cervical stenosis, follows with Dr. Stock for the above.  She has an otherwise nonfocal neurologic exam.  Was seen and evaluated in triage and imaging and blood work was ordered for the patient.  Pain medication ordered for the patient, she is on appropriate regimen including muscle relaxers, anti-inflammatories and opioids at home.  Feel that this is more likely related to her cervical stenosis as she has an otherwise nonfocal exam at this time and is atraumatic in nature    Patient reevaluated, feeling stable.  Discussed risks and benefits of hospitalization.  Patient in agreement with outpatient follow-up with her pain management physician, spine as well as her neurologist.  Was given return  precautions for any change or worsening of her symptoms or development of focal deficit.  Patient and agree with the plan of care was discharged in stable condition.        External Records Reviewed: I reviewed recent and relevant outside records including: prior discharge summary  Escalation of Care: Appropriate for Discharge per ED course/MDM  Social Determinants Affecting Care:Multiple chronic illnesses  Prescription Drug Consideration: pain meds  Diagnostic testing considered: imaging  Discussion of Management with Other Providers: I discussed the patient/results with: n/a    Objective Data  I have independently interpreted the following labs, imaging studies and MDM added to ED Course  Labs Reviewed   CBC WITH AUTO DIFFERENTIAL - Abnormal       Result Value    WBC 13.1 (*)     nRBC 0.0      RBC 4.64      Hemoglobin 11.9 (*)     Hematocrit 40.4      MCV 87      MCH 25.6 (*)     MCHC 29.5 (*)     RDW 14.3      Platelets 265      Neutrophils % 81.2      Immature Granulocytes %, Automated 0.4      Lymphocytes % 11.3      Monocytes % 6.8      Eosinophils % 0.1      Basophils % 0.2      Neutrophils Absolute 10.68 (*)     Immature Granulocytes Absolute, Automated 0.05      Lymphocytes Absolute 1.48      Monocytes Absolute 0.90      Eosinophils Absolute 0.01      Basophils Absolute 0.02     COMPREHENSIVE METABOLIC PANEL - Abnormal    Glucose 76      Sodium 140      Potassium 4.4      Chloride 110 (*)     Bicarbonate 22      Anion Gap 12      Urea Nitrogen 17      Creatinine 0.89      eGFR 75      Calcium 9.3      Albumin 4.3      Alkaline Phosphatase 65      Total Protein 7.1      AST 21      Bilirubin, Total 0.7      ALT 19     URINALYSIS WITH REFLEX MICROSCOPIC AND CULTURE - Normal    Color, Urine Yellow      Appearance, Urine Clear      Specific Gravity, Urine 1.010      pH, Urine 5.0      Protein, Urine NEGATIVE      Glucose, Urine NEGATIVE      Blood, Urine NEGATIVE      Ketones, Urine NEGATIVE      Bilirubin,  Urine NEGATIVE      Urobilinogen, Urine <2.0      Nitrite, Urine NEGATIVE      Leukocyte Esterase, Urine NEGATIVE     URINALYSIS WITH REFLEX MICROSCOPIC AND CULTURE    Narrative:     The following orders were created for panel order Urinalysis with Reflex Microscopic and Culture.  Procedure                               Abnormality         Status                     ---------                               -----------         ------                     Urinalysis with Reflex M...[[id number]]  Normal              Final result               Extra Urine Gray Tube[[id number]]                                                         Please view results for these tests on the individual orders.   EXTRA URINE GRAY TUBE       CT cervical spine wo IV contrast   Final Result   1.  No CT evidence of acute fracture.   2.  Postoperative and degenerative changes throughout the cervical   spine, as above.        Signed by: James Prary 11/30/2023 2:12 PM   Dictation workstation:   [id number]      CT lumbar spine wo IV contrast   Final Result   Thoracic spine: No acute fracture or malalignment.  Minimal   degenerative changes.   Lumbar spine: No acute fracture or malalignment.  Degenerative changes   in the lower lumbar spine greater at L4-5 and L5-S1.  Findings would   be better evaluated with MRI.   4 mm nonobstructive calculus upper pole left kidney.   Signed by Kaden Bruce DO      CT thoracic spine wo IV contrast   Final Result   Thoracic spine: No acute fracture or malalignment.  Minimal   degenerative changes.   Lumbar spine: No acute fracture or malalignment.  Degenerative changes   in the lower lumbar spine greater at L4-5 and L5-S1.  Findings would   be better evaluated with MRI.   4 mm nonobstructive calculus upper pole left kidney.   Signed by Kaden Bruce DO          ED Course  ED Course as of 12/02/23 1549   Thu Nov 30, 2023   1549 Reevaluated, exam remained stable.  Discussed CT findings with chronic  stenosis noted.  Discussed risk and benefits of continued management in the emergency department versus follow-up with your family physician in pain management.  Patient in agreement with discharge home with outpatient follow-up.  Patient discharged in stable condition. [LP]      ED Course User Index  [LP] India García DO         Diagnoses as of 12/02/23 1549   Neck muscle spasm       Procedure  Procedures    Disposition: discharged    India García DO  Emergency Medicine  Medical Toxicology     India García DO  12/02/23 1549

## 2023-12-01 ENCOUNTER — ANCILLARY PROCEDURE (OUTPATIENT)
Dept: RADIOLOGY | Facility: CLINIC | Age: 58
End: 2023-12-01
Payer: COMMERCIAL

## 2023-12-01 ENCOUNTER — HOSPITAL ENCOUNTER (OUTPATIENT)
Dept: OPERATING ROOM | Facility: CLINIC | Age: 58
Discharge: HOME | End: 2023-12-01
Payer: COMMERCIAL

## 2023-12-01 VITALS
WEIGHT: 197.09 LBS | OXYGEN SATURATION: 99 % | BODY MASS INDEX: 30.93 KG/M2 | HEIGHT: 67 IN | SYSTOLIC BLOOD PRESSURE: 121 MMHG | RESPIRATION RATE: 17 BRPM | TEMPERATURE: 96.8 F | DIASTOLIC BLOOD PRESSURE: 73 MMHG | HEART RATE: 85 BPM

## 2023-12-01 DIAGNOSIS — M54.12 CERVICAL RADICULOPATHY: ICD-10-CM

## 2023-12-01 DIAGNOSIS — Z98.1 S/P CERVICAL SPINAL FUSION: ICD-10-CM

## 2023-12-01 LAB — GLUCOSE BLD MANUAL STRIP-MCNC: 86 MG/DL (ref 74–99)

## 2023-12-01 PROCEDURE — 77003 FLUOROGUIDE FOR SPINE INJECT: CPT

## 2023-12-01 PROCEDURE — 62323 NJX INTERLAMINAR LMBR/SAC: CPT | Performed by: PAIN MEDICINE

## 2023-12-01 PROCEDURE — 62321 NJX INTERLAMINAR CRV/THRC: CPT

## 2023-12-01 PROCEDURE — 2500000005 HC RX 250 GENERAL PHARMACY W/O HCPCS: Mod: SE | Performed by: PAIN MEDICINE

## 2023-12-01 PROCEDURE — 62321 NJX INTERLAMINAR CRV/THRC: CPT | Performed by: PAIN MEDICINE

## 2023-12-01 PROCEDURE — 2500000004 HC RX 250 GENERAL PHARMACY W/ HCPCS (ALT 636 FOR OP/ED): Mod: SE | Performed by: PAIN MEDICINE

## 2023-12-01 PROCEDURE — 99152 MOD SED SAME PHYS/QHP 5/>YRS: CPT

## 2023-12-01 PROCEDURE — 7100000010 HC PHASE TWO TIME - EACH INCREMENTAL 1 MINUTE

## 2023-12-01 PROCEDURE — 94760 N-INVAS EAR/PLS OXIMETRY 1: CPT

## 2023-12-01 PROCEDURE — 82947 ASSAY GLUCOSE BLOOD QUANT: CPT

## 2023-12-01 PROCEDURE — 7100000009 HC PHASE TWO TIME - INITIAL BASE CHARGE

## 2023-12-01 PROCEDURE — 2550000001 HC RX 255 CONTRASTS: Mod: SE | Performed by: PAIN MEDICINE

## 2023-12-01 RX ORDER — MIDAZOLAM HYDROCHLORIDE 1 MG/ML
INJECTION INTRAMUSCULAR; INTRAVENOUS AS NEEDED
Status: COMPLETED | OUTPATIENT
Start: 2023-12-01 | End: 2023-12-01

## 2023-12-01 RX ORDER — FUROSEMIDE 10 MG/ML
10 SOLUTION ORAL DAILY
COMMUNITY

## 2023-12-01 RX ORDER — LIDOCAINE HYDROCHLORIDE 5 MG/ML
INJECTION, SOLUTION INFILTRATION; PERINEURAL AS NEEDED
Status: COMPLETED | OUTPATIENT
Start: 2023-12-01 | End: 2023-12-01

## 2023-12-01 RX ORDER — DEXAMETHASONE SODIUM PHOSPHATE 100 MG/10ML
INJECTION INTRAMUSCULAR; INTRAVENOUS AS NEEDED
Status: COMPLETED | OUTPATIENT
Start: 2023-12-01 | End: 2023-12-01

## 2023-12-01 RX ADMIN — LIDOCAINE HYDROCHLORIDE 11 ML: 5 INJECTION, SOLUTION INFILTRATION; PERINEURAL at 11:43

## 2023-12-01 RX ADMIN — IOHEXOL 240 MG: 240 INJECTION, SOLUTION INTRATHECAL; INTRAVASCULAR; INTRAVENOUS; ORAL at 11:42

## 2023-12-01 RX ADMIN — MIDAZOLAM HYDROCHLORIDE 2 MG: 1 INJECTION, SOLUTION INTRAMUSCULAR; INTRAVENOUS at 11:35

## 2023-12-01 RX ADMIN — DEXAMETHASONE SODIUM PHOSPHATE 10 MG: 10 INJECTION INTRAMUSCULAR; INTRAVENOUS at 11:45

## 2023-12-01 ASSESSMENT — PAIN SCALES - GENERAL
PAINLEVEL_OUTOF10: 10 - WORST POSSIBLE PAIN
PAINLEVEL_OUTOF10: 5 - MODERATE PAIN
PAINLEVEL_OUTOF10: 9
PAINLEVEL_OUTOF10: 8

## 2023-12-01 ASSESSMENT — COLUMBIA-SUICIDE SEVERITY RATING SCALE - C-SSRS
1. IN THE PAST MONTH, HAVE YOU WISHED YOU WERE DEAD OR WISHED YOU COULD GO TO SLEEP AND NOT WAKE UP?: NO
2. HAVE YOU ACTUALLY HAD ANY THOUGHTS OF KILLING YOURSELF?: NO
6. HAVE YOU EVER DONE ANYTHING, STARTED TO DO ANYTHING, OR PREPARED TO DO ANYTHING TO END YOUR LIFE?: NO

## 2023-12-01 ASSESSMENT — PAIN - FUNCTIONAL ASSESSMENT
PAIN_FUNCTIONAL_ASSESSMENT: 0-10
PAIN_FUNCTIONAL_ASSESSMENT: 0-10

## 2023-12-01 ASSESSMENT — PAIN DESCRIPTION - DESCRIPTORS: DESCRIPTORS: ACHING

## 2023-12-01 NOTE — H&P
History Of Present Illness  Una Gallegos is a 58 y.o. female presents for procedure state below. Endorses no changes in past medical history or medical health since last seen in clinic.     Past Medical History  She has a past medical history of Acute candidiasis of vulva and vagina (2015), Acute maxillary sinusitis, unspecified (2020), Acute recurrent sinusitis, unspecified (2018), Other specified disorders of synovium and tendon, other site (2014), Pain in unspecified ankle and joints of unspecified foot (2014), Personal history of other diseases of the musculoskeletal system and connective tissue, Personal history of other diseases of the respiratory system (2015), Personal history of other infectious and parasitic diseases (03/15/2015), Personal history of other mental and behavioral disorders, Personal history of other specified conditions, Personal history of other specified conditions (2022), Personal history of other specified conditions (2020), Personal history of pneumonia (recurrent) (2015), and Spasmodic torticollis (2021).    Surgical History  She has a past surgical history that includes Other surgical history (2014);  section, classic (2014); Appendectomy (2014); Hysterectomy (10/05/2015); CT angio head w and wo IV contrast (12/10/2019); CT angio neck (12/10/2019); MR angio head wo IV contrast (2020); MR angio neck wo IV contrast (2020); MR angio head wo IV contrast (2021); MR angio neck wo IV contrast (2021); CT angio head w and wo IV contrast (2022); and CT angio neck (2022).     Social History  She reports that she has been smoking cigarettes. She has never used smokeless tobacco. She reports that she does not drink alcohol and does not use drugs.    Family History  Family History   Problem Relation Name Age of Onset    Hypertension Mother      Cancer Father      Eating disorder  Sister      Other (hoarding behavior) Sister      Lupus Mother's Sister          Systemic    Rheum arthritis Other Aunt         Allergies  Patient has no known allergies.    Review of Symptoms:   Constitutional: Negative for chills, diaphoresis or fever  HENT: Negative for neck swelling  Eyes:.  Negative for eye pain  Respiratory:.  Negative for cough, shortness of breath or wheezing    Cardiovascular:.  Negative for chest pain or palpitations  Gastrointestinal:.  Negative for abdominal pain, nausea and vomiting  Genitourinary:.  Negative for urgency  Musculoskeletal:  Positive for back pain. Positive for joint pain. Denies falls within the past 3 months.  Skin: Negative for wounds or itching   Neurological: Negative for dizziness, seizures, loss of consciousness and weakness  Endo/Heme/Allergies: Does not bruise/bleed easily  Psychiatric/Behavioral: Negative for depression. The patient does not appear anxious.       PHYSICAL EXAM  Vitals signs reviewed  Constitutional:       General: Not in acute distress     Appearance: Normal appearance. Not ill-appearing.  HENT:     Head: Normocephalic and atraumatic  Eyes:     Conjunctiva/sclera: Conjunctivae normal  Cardiovascular:     Rate and Rhythm: Normal rate and regular rhythm  Pulmonary:     Effort: No respiratory distress  Abdominal:     Palpations: Abdomen is soft  Musculoskeletal: CHAPMAN  Skin:     General: Skin is warm and dry  Neurological:     General: No focal deficit present  Psychiatric:         Mood and Affect: Mood normal         Behavior: Behavior normal     Last Recorded Vitals  There were no vitals taken for this visit.    Relevant Results  Current Outpatient Medications   Medication Instructions    albuterol 90 mcg/actuation inhaler inhalation    aspirin 81 mg EC tablet oral    atorvastatin (LIPITOR) 80 mg, oral, Nightly    azelastine (Astelin) 137 mcg (0.1 %) nasal spray 2 sprays, nasal, 2 times daily    dimethyl fumarate (Tecfidera) 240 mg capsule,delayed  release(DR/EC) capsule TAKE ONE (1) CAPSULE BY MOUTH TWICE DAILY. SWALLOW WHOLE. DO NOT OPEN CAPSULE. DO NOT CRUSH OR CHEW.    docusate sodium (COLACE) 100 mg, oral, 2 times daily    escitalopram (LEXAPRO) 10 mg, oral, Daily    gabapentin (NEURONTIN) 800 mg, oral, 3 times daily    levETIRAcetam (KEPPRA) 500 mg, oral, 2 times daily    metFORMIN (GLUCOPHAGE) 500 mg, oral, Daily with breakfast    methylPREDNISolone (Medrol Dospak) 4 mg tablets Follow schedule on package instructions    metoprolol succinate XL (TOPROL-XL) 50 mg, oral, Daily, Do not crush or chew.    mv-min/folic/vit K/lycop/coQ10 (DAILY MULTIVITAMIN ORAL) 1 tablet, oral, Daily    nitroglycerin (NITROSTAT) 0.4 mg, sublingual, Every 5 minutes for up to 3 doses as needed for chest pain. Call 911 if pain persists.    oxyCODONE-acetaminophen (Percocet) 5-325 mg tablet 1 tablet, oral, Daily    polyethylene glycol (Glycolax) 17 gram/dose powder MIX 1 CAPFUL(17GM) IN 8 OUNCES OF WATER, JUICE, OR TEASPOONFUL AND DRINK DAILY.    rimegepant (NURTEC ODT) 75 mg, oral, Daily PRN    sennosides (Senokot) 8.6 mg tablet 1 tablet, oral, As needed    topiramate (TOPAMAX) 100 mg, oral, 2 times daily       No results found for this or any previous visit from the past 1000 days.     No image results found.       1. S/P cervical spinal fusion  Referral to Pain Medicine    Referral to Pain Medicine      2. Cervical radiculopathy  Epidural Steroid Injection    Epidural Steroid Injection           ASSESSMENT/PLAN  Una Gallegos is a 58 y.o. female presents for a cervical epidural steroid injection    Our plan is as follows:  - Follow In pain clinic  - Continue to participate in physical therapy as well as physician directed home exercises  - Continue pain medications as prescribed       Anali Stephens DO

## 2023-12-01 NOTE — DISCHARGE INSTRUCTIONS
Post-injection instructions:    Your pain may not be gone immediately after the procedure--it usually takes the steroid 3-5 days to start working.   It may take several weeks for the medicine to reach its' full effect.   Pay attention to how much pain relief (what percentage compared to before the procedure) you get and for how long it lasts.   We will ask you for this at the follow up visit.     Activity: Avoid strenuous activity for 24 hours. After that return to your normal activity level.     Bandages: Remove tomorrow    Showering/Bathing: You may shower after bandage is removed     Follow up: With Dr. Lynne over the phone in one week to discuss how you are doing  Call Summer to Schedule.   Summer's Phone:  [phone]    After hours, call the   ([phone]) and ask for the pain management answering service if you notice any of the below:     Call the doctor immediately: if you notice:    Excessive bleeding from procedure site (brisk bright red bleeding from the site or bleeding that soaks the bandages or does not stop)   Severe headache  Inability to walk, leg or arm weakness or numbness that is significantly worse after the procedure   Uncontrolled pain   Inability to control your bowels or bladder   Signs of infection: Fever above 101.5F, redness, swelling, pus or drainage from the site

## 2023-12-01 NOTE — BRIEF OP NOTE
Date: 2023  [place] [facility] [place]    Name: Una Gallegos, : 1965, Age: 58 y.o., MRN: [id number], Sex: female    Diagnosis  Post-laminectomy syndrome of the cervical spine      Procedures  C7-T1 epidural steroid injection    Surgeons   Dr. Lynne    Resident/Fellow/Other Assistant:  Dr. Curiel    Procedure Summary  Anesthesia: sedation  ASA: ASA status not filed in the log.  Anesthesia Staff: No anesthesia staff entered.  Estimated Blood Loss: 0 mL  Intra-op Medications:   Lidocaine 0.5%  Dexamethasone 10mg  Omnipaque 240 mg      Intraprocedure I/O Totals       None           Specimen: No specimens collected     Staff:   Circulator: Holly Cox RN          Procedure details:  After informed consent was obtained, the patient was brought to the operating  room and placed in the prone position.  Pulse oximetry and blood pressure cuff were placed.  The neck area was prepped and draped in the usual sterile fashion.  Using fluoroscopic guidance, the skin and subcutaneous tissue overlying needle trajectory to the left C7-T1 interlaminar epidural space was anesthetized with 0.5% lidocaine.  An 18-gauge Tuohy needle was then advanced in the paraspinous approach into the epidural space.  Entry into the epidural space was confirmed using the loss of resistance technique with a glass syringe and 2 cc of air and 60 degree contralateral oblique.  Injection of contrast revealed appropriate spread without vascular uptake in AP and 60 degree contralateral oblique.  Subsequently, 4 mL of 0.5% lidocaine and 10mg dexamethasone were injected.  The patient tolerated the procedure well and the needle was removed.  They were then transferred to PACU in stable condition.     Complications:  None; patient tolerated the procedure well.     Disposition: PACU - hemodynamically stable.  Condition: stable  Specimens Collected: No specimens collected  Attending Attestation: I was present and scrubbed for the  entire procedure.    Dr. Maged M.D.

## 2023-12-04 NOTE — ADDENDUM NOTE
Encounter addended by: Mariama Stroud RN on: 12/4/2023 12:57 PM   Actions taken: Contacts section saved, Flowsheet accepted

## 2023-12-04 NOTE — ADDENDUM NOTE
Encounter addended by: Mariama Stroud RN on: 12/4/2023 1:57 PM   Actions taken: Contacts section saved

## 2023-12-05 ENCOUNTER — APPOINTMENT (OUTPATIENT)
Dept: RADIOLOGY | Facility: HOSPITAL | Age: 58
End: 2023-12-05
Payer: COMMERCIAL

## 2023-12-08 ENCOUNTER — SPECIALTY PHARMACY (OUTPATIENT)
Dept: PHARMACY | Facility: CLINIC | Age: 58
End: 2023-12-08

## 2023-12-08 PROCEDURE — RXMED WILLOW AMBULATORY MEDICATION CHARGE

## 2023-12-11 ENCOUNTER — PHARMACY VISIT (OUTPATIENT)
Dept: PHARMACY | Facility: CLINIC | Age: 58
End: 2023-12-11
Payer: MEDICAID

## 2023-12-15 ENCOUNTER — APPOINTMENT (OUTPATIENT)
Dept: PRIMARY CARE | Facility: CLINIC | Age: 58
End: 2023-12-15
Payer: COMMERCIAL

## 2023-12-28 DIAGNOSIS — K59.09 OTHER CONSTIPATION: ICD-10-CM

## 2023-12-28 RX ORDER — NICOTINE POLACRILEX 2 MG
LOZENGE BUCCAL
Qty: 510 G | Refills: 3 | Status: SHIPPED | OUTPATIENT
Start: 2023-12-28 | End: 2024-02-08 | Stop reason: HOSPADM

## 2024-01-02 ENCOUNTER — APPOINTMENT (OUTPATIENT)
Dept: RADIOLOGY | Facility: HOSPITAL | Age: 59
End: 2024-01-02
Payer: COMMERCIAL

## 2024-01-02 ENCOUNTER — APPOINTMENT (OUTPATIENT)
Dept: OPHTHALMOLOGY | Facility: CLINIC | Age: 59
End: 2024-01-02
Payer: COMMERCIAL

## 2024-01-02 ENCOUNTER — HOSPITAL ENCOUNTER (EMERGENCY)
Facility: HOSPITAL | Age: 59
Discharge: HOME | End: 2024-01-02
Payer: COMMERCIAL

## 2024-01-02 VITALS
BODY MASS INDEX: 30.45 KG/M2 | HEIGHT: 67 IN | DIASTOLIC BLOOD PRESSURE: 71 MMHG | WEIGHT: 194 LBS | RESPIRATION RATE: 18 BRPM | SYSTOLIC BLOOD PRESSURE: 114 MMHG | HEART RATE: 80 BPM | TEMPERATURE: 97.9 F

## 2024-01-02 DIAGNOSIS — K52.9 ENTERITIS: Primary | ICD-10-CM

## 2024-01-02 LAB
ALBUMIN SERPL BCP-MCNC: 4.4 G/DL (ref 3.4–5)
ALP SERPL-CCNC: 65 U/L (ref 33–110)
ALT SERPL W P-5'-P-CCNC: 18 U/L (ref 7–45)
ANION GAP SERPL CALC-SCNC: 11 MMOL/L (ref 10–20)
AST SERPL W P-5'-P-CCNC: 15 U/L (ref 9–39)
BASOPHILS # BLD AUTO: 0.04 X10*3/UL (ref 0–0.1)
BASOPHILS NFR BLD AUTO: 0.7 %
BILIRUB SERPL-MCNC: 0.9 MG/DL (ref 0–1.2)
BUN SERPL-MCNC: 16 MG/DL (ref 6–23)
CALCIUM SERPL-MCNC: 9.5 MG/DL (ref 8.6–10.6)
CARDIAC TROPONIN I PNL SERPL HS: <3 NG/L (ref 0–34)
CHLORIDE SERPL-SCNC: 110 MMOL/L (ref 98–107)
CO2 SERPL-SCNC: 26 MMOL/L (ref 21–32)
CREAT SERPL-MCNC: 0.7 MG/DL (ref 0.5–1.05)
EOSINOPHIL # BLD AUTO: 0.24 X10*3/UL (ref 0–0.7)
EOSINOPHIL NFR BLD AUTO: 4.5 %
ERYTHROCYTE [DISTWIDTH] IN BLOOD BY AUTOMATED COUNT: 13.6 % (ref 11.5–14.5)
GFR SERPL CREATININE-BSD FRML MDRD: >90 ML/MIN/1.73M*2
GLUCOSE SERPL-MCNC: 79 MG/DL (ref 74–99)
HCT VFR BLD AUTO: 38 % (ref 36–46)
HGB BLD-MCNC: 11.7 G/DL (ref 12–16)
IMM GRANULOCYTES # BLD AUTO: 0.01 X10*3/UL (ref 0–0.7)
IMM GRANULOCYTES NFR BLD AUTO: 0.2 % (ref 0–0.9)
LIPASE SERPL-CCNC: 23 U/L (ref 9–82)
LYMPHOCYTES # BLD AUTO: 1.9 X10*3/UL (ref 1.2–4.8)
LYMPHOCYTES NFR BLD AUTO: 35.6 %
MCH RBC QN AUTO: 25.2 PG (ref 26–34)
MCHC RBC AUTO-ENTMCNC: 30.8 G/DL (ref 32–36)
MCV RBC AUTO: 82 FL (ref 80–100)
MONOCYTES # BLD AUTO: 0.71 X10*3/UL (ref 0.1–1)
MONOCYTES NFR BLD AUTO: 13.3 %
NEUTROPHILS # BLD AUTO: 2.44 X10*3/UL (ref 1.2–7.7)
NEUTROPHILS NFR BLD AUTO: 45.7 %
NRBC BLD-RTO: 0 /100 WBCS (ref 0–0)
PLATELET # BLD AUTO: 240 X10*3/UL (ref 150–450)
POTASSIUM SERPL-SCNC: 3.7 MMOL/L (ref 3.5–5.3)
PROT SERPL-MCNC: 6.9 G/DL (ref 6.4–8.2)
RBC # BLD AUTO: 4.65 X10*6/UL (ref 4–5.2)
SODIUM SERPL-SCNC: 143 MMOL/L (ref 136–145)
WBC # BLD AUTO: 5.3 X10*3/UL (ref 4.4–11.3)

## 2024-01-02 PROCEDURE — 84075 ASSAY ALKALINE PHOSPHATASE: CPT | Performed by: EMERGENCY MEDICINE

## 2024-01-02 PROCEDURE — 96375 TX/PRO/DX INJ NEW DRUG ADDON: CPT | Mod: 59

## 2024-01-02 PROCEDURE — 36415 COLL VENOUS BLD VENIPUNCTURE: CPT | Performed by: EMERGENCY MEDICINE

## 2024-01-02 PROCEDURE — 96361 HYDRATE IV INFUSION ADD-ON: CPT

## 2024-01-02 PROCEDURE — 84484 ASSAY OF TROPONIN QUANT: CPT | Performed by: PHYSICIAN ASSISTANT

## 2024-01-02 PROCEDURE — 74177 CT ABD & PELVIS W/CONTRAST: CPT | Performed by: RADIOLOGY

## 2024-01-02 PROCEDURE — 96376 TX/PRO/DX INJ SAME DRUG ADON: CPT

## 2024-01-02 PROCEDURE — 74177 CT ABD & PELVIS W/CONTRAST: CPT

## 2024-01-02 PROCEDURE — 93005 ELECTROCARDIOGRAM TRACING: CPT

## 2024-01-02 PROCEDURE — 2500000004 HC RX 250 GENERAL PHARMACY W/ HCPCS (ALT 636 FOR OP/ED): Mod: SE | Performed by: PHYSICIAN ASSISTANT

## 2024-01-02 PROCEDURE — 99285 EMERGENCY DEPT VISIT HI MDM: CPT | Performed by: PHYSICIAN ASSISTANT

## 2024-01-02 PROCEDURE — 85025 COMPLETE CBC W/AUTO DIFF WBC: CPT | Performed by: EMERGENCY MEDICINE

## 2024-01-02 PROCEDURE — 2550000001 HC RX 255 CONTRASTS: Mod: SE | Performed by: PHYSICIAN ASSISTANT

## 2024-01-02 PROCEDURE — 96374 THER/PROPH/DIAG INJ IV PUSH: CPT | Mod: 59

## 2024-01-02 PROCEDURE — 83690 ASSAY OF LIPASE: CPT | Performed by: EMERGENCY MEDICINE

## 2024-01-02 PROCEDURE — 99284 EMERGENCY DEPT VISIT MOD MDM: CPT

## 2024-01-02 PROCEDURE — 2500000004 HC RX 250 GENERAL PHARMACY W/ HCPCS (ALT 636 FOR OP/ED): Mod: SE

## 2024-01-02 RX ORDER — ONDANSETRON 4 MG/1
4 TABLET, ORALLY DISINTEGRATING ORAL EVERY 8 HOURS PRN
Qty: 21 TABLET | Refills: 0 | Status: SHIPPED | OUTPATIENT
Start: 2024-01-02

## 2024-01-02 RX ORDER — MORPHINE SULFATE 4 MG/ML
4 INJECTION INTRAVENOUS ONCE
Status: COMPLETED | OUTPATIENT
Start: 2024-01-02 | End: 2024-01-02

## 2024-01-02 RX ORDER — ONDANSETRON HYDROCHLORIDE 2 MG/ML
4 INJECTION, SOLUTION INTRAVENOUS ONCE
Status: COMPLETED | OUTPATIENT
Start: 2024-01-02 | End: 2024-01-02

## 2024-01-02 RX ORDER — DICYCLOMINE HYDROCHLORIDE 20 MG/1
20 TABLET ORAL 4 TIMES DAILY PRN
Qty: 20 TABLET | Refills: 0 | Status: SHIPPED | OUTPATIENT
Start: 2024-01-02

## 2024-01-02 RX ORDER — ONDANSETRON HYDROCHLORIDE 2 MG/ML
INJECTION, SOLUTION INTRAVENOUS
Status: COMPLETED
Start: 2024-01-02 | End: 2024-01-02

## 2024-01-02 RX ADMIN — ONDANSETRON 4 MG: 2 INJECTION INTRAMUSCULAR; INTRAVENOUS at 19:46

## 2024-01-02 RX ADMIN — IOHEXOL 75 ML: 350 INJECTION, SOLUTION INTRAVENOUS at 20:31

## 2024-01-02 RX ADMIN — ONDANSETRON 4 MG: 2 INJECTION, SOLUTION INTRAMUSCULAR; INTRAVENOUS at 23:05

## 2024-01-02 RX ADMIN — MORPHINE SULFATE 4 MG: 4 INJECTION INTRAVENOUS at 19:46

## 2024-01-02 RX ADMIN — SODIUM CHLORIDE, POTASSIUM CHLORIDE, SODIUM LACTATE AND CALCIUM CHLORIDE 1000 ML: 600; 310; 30; 20 INJECTION, SOLUTION INTRAVENOUS at 19:47

## 2024-01-02 RX ADMIN — ONDANSETRON HYDROCHLORIDE 4 MG: 2 INJECTION, SOLUTION INTRAVENOUS at 19:46

## 2024-01-02 ASSESSMENT — PAIN DESCRIPTION - PROGRESSION: CLINICAL_PROGRESSION: NOT CHANGED

## 2024-01-02 ASSESSMENT — LIFESTYLE VARIABLES
HAVE PEOPLE ANNOYED YOU BY CRITICIZING YOUR DRINKING: NO
EVER FELT BAD OR GUILTY ABOUT YOUR DRINKING: NO
EVER HAD A DRINK FIRST THING IN THE MORNING TO STEADY YOUR NERVES TO GET RID OF A HANGOVER: NO
HAVE YOU EVER FELT YOU SHOULD CUT DOWN ON YOUR DRINKING: NO
REASON UNABLE TO ASSESS: NO

## 2024-01-02 ASSESSMENT — COLUMBIA-SUICIDE SEVERITY RATING SCALE - C-SSRS
6. HAVE YOU EVER DONE ANYTHING, STARTED TO DO ANYTHING, OR PREPARED TO DO ANYTHING TO END YOUR LIFE?: NO
2. HAVE YOU ACTUALLY HAD ANY THOUGHTS OF KILLING YOURSELF?: NO
1. IN THE PAST MONTH, HAVE YOU WISHED YOU WERE DEAD OR WISHED YOU COULD GO TO SLEEP AND NOT WAKE UP?: NO

## 2024-01-02 ASSESSMENT — PAIN DESCRIPTION - DESCRIPTORS: DESCRIPTORS: SHARP

## 2024-01-02 ASSESSMENT — PAIN - FUNCTIONAL ASSESSMENT: PAIN_FUNCTIONAL_ASSESSMENT: 0-10

## 2024-01-02 ASSESSMENT — PAIN DESCRIPTION - LOCATION: LOCATION: ABDOMEN

## 2024-01-02 ASSESSMENT — PAIN SCALES - GENERAL: PAINLEVEL_OUTOF10: 10 - WORST POSSIBLE PAIN

## 2024-01-02 ASSESSMENT — PAIN DESCRIPTION - PAIN TYPE: TYPE: ACUTE PAIN

## 2024-01-02 NOTE — Clinical Note
Una Gallegos was seen and treated in our emergency department on 1/2/2024.  She may return to work on 01/05/2024.       If you have any questions or concerns, please don't hesitate to call.      Ashli Correa PA-C

## 2024-01-02 NOTE — ED TRIAGE NOTES
Patient reports that she started having 10/10 epigastric pain that started yesterday while she was having a BM

## 2024-01-03 ENCOUNTER — CLINICAL SUPPORT (OUTPATIENT)
Dept: EMERGENCY MEDICINE | Facility: HOSPITAL | Age: 59
End: 2024-01-03
Payer: COMMERCIAL

## 2024-01-03 LAB
ATRIAL RATE: 92 BPM
P AXIS: 60 DEGREES
P OFFSET: 189 MS
P ONSET: 135 MS
PR INTERVAL: 164 MS
Q ONSET: 217 MS
QRS COUNT: 15 BEATS
QRS DURATION: 84 MS
QT INTERVAL: 356 MS
QTC CALCULATION(BAZETT): 440 MS
QTC FREDERICIA: 410 MS
R AXIS: 23 DEGREES
T AXIS: 50 DEGREES
T OFFSET: 395 MS
VENTRICULAR RATE: 92 BPM

## 2024-01-03 NOTE — ED PROVIDER NOTES
"This is a 58-year-old female with past medical history of Sjogren's syndrome, multiple sclerosis as well as lupus who presents to the ED with epigastric abdominal pain with associated nausea that began yesterday while having a bowel movement.  She denies this ever happening previously.  She states her pain radiates to the left side of her abdomen.  She denies any vomiting, diarrhea, or constipation.  She denies urinary symptoms.  She denies chest pain or shortness of breath.  She did not take anything at home for her symptoms.  She does state that her mouth and lips feel like they are dry and she is concerned she may be dehydrated.      History provided by:  Patient   used: No             Visit Vitals  /71   Pulse 80   Temp 36.6 °C (97.9 °F)   Resp 18   Ht 1.702 m (5' 7\")   Wt 88 kg (194 lb)   BMI 30.38 kg/m²   Smoking Status Every Day   BSA 2.04 m²          Physical Exam     Physical exam:   General: Vitals noted, no distress. Afebrile.   EENT:  Hearing grossly intact. Normal phonation. MMM. Airway patient. PERRL. EOMI.   Neck: No midline tenderness or paraspinal tenderness. FROM.   Cardiac: Regular, rate, rhythm. Normal S1 and S2.  No murmurs, gallops, rubs.   Pulmonary: Good air exchange. Lungs clear bilaterally. No wheezes, rhonchi, rales. No accessory muscle use.   Abdomen: Soft, tender to palpation epigastric region left upper quadrant.  No rebound or guarding.  No peritoneal signs. Normoactive bowel sounds.   Back: No CVA tenderness. No midline tenderness or paraspinal tenderness. No obvious deformity.   Extremities: No peripheral edema.  Full range of motion. Moves all extremities freely. No tenderness throughout extremities.   Skin: No rash. Warm and Dry.   Neuro: No focal neurologic deficits. CN 2-12 grossly intact. Sensation equal bilaterally. No weakness.         Labs Reviewed   CBC WITH AUTO DIFFERENTIAL - Abnormal       Result Value    WBC 5.3      nRBC 0.0      RBC 4.65      " Hemoglobin 11.7 (*)     Hematocrit 38.0      MCV 82      MCH 25.2 (*)     MCHC 30.8 (*)     RDW 13.6      Platelets 240      Neutrophils % 45.7      Immature Granulocytes %, Automated 0.2      Lymphocytes % 35.6      Monocytes % 13.3      Eosinophils % 4.5      Basophils % 0.7      Neutrophils Absolute 2.44      Immature Granulocytes Absolute, Automated 0.01      Lymphocytes Absolute 1.90      Monocytes Absolute 0.71      Eosinophils Absolute 0.24      Basophils Absolute 0.04     COMPREHENSIVE METABOLIC PANEL - Abnormal    Glucose 79      Sodium 143      Potassium 3.7      Chloride 110 (*)     Bicarbonate 26      Anion Gap 11      Urea Nitrogen 16      Creatinine 0.70      eGFR >90      Calcium 9.5      Albumin 4.4      Alkaline Phosphatase 65      Total Protein 6.9      AST 15      Bilirubin, Total 0.9      ALT 18     LIPASE - Normal    Lipase 23      Narrative:     Venipuncture immediately after or during the administration of Metamizole may lead to falsely low results. Testing should be performed immediately prior to Metamizole dosing.   TROPONIN I, HIGH SENSITIVITY - Normal    Troponin I, High Sensitivity <3      Narrative:     Less than 99th percentile of normal range cutoff-  Female and children under 18 years old <35 ng/L; Male <54 ng/L: Negative  Repeat testing should be performed if clinically indicated.     Female and children under 18 years old  ng/L; Male  ng/L:  Consistent with possible cardiac damage and possible increased clinical   risk. Serial measurements may help to assess extent of myocardial damage.     >120 ng/L: Consistent with cardiac damage, increased clinical risk and  myocardial infarction. Serial measurements may help assess extent of   myocardial damage.      NOTE: Children less than 1 year old may have higher baseline troponin   levels and results should be interpreted in conjunction with the overall   clinical context.    NOTE: Troponin I testing is performed using a  different   testing methodology at [facility] than at other   [facility]. Direct result comparisons should only   be made within the same method.     URINALYSIS WITH REFLEX CULTURE AND MICROSCOPIC    Narrative:     The following orders were created for panel order Urinalysis with Reflex Culture and Microscopic.  Procedure                               Abnormality         Status                     ---------                               -----------         ------                     Urinalysis with Reflex C...[[id number]]                                                 Extra Urine Gray Tube[[id number]]                                                         Please view results for these tests on the individual orders.   URINALYSIS WITH REFLEX CULTURE AND MICROSCOPIC   EXTRA URINE GRAY TUBE       CT abdomen pelvis w IV contrast   Final Result   1.  Mild fluid distention of the stomach multiple distal small loops   within right lower quadrant/pelvis, possibly representing mild   enteritis. No bowel obstruction. No focal inflammatory changes   2. Nonobstructing left-sided renal calculus.   3. Additional chronic findings as above.        I personally reviewed the images/study and I agree with the findings   as stated. This study was interpreted at [facility], [place], [place].        MACRO:   None        Signed by: Catracho Morrow 1/2/2024 10:58 PM   Dictation workstation:   [id number]            ED Course & MDM     Medical Decision Making  This is a 58-year-old female with past medical history of Sjogren's syndrome, lupus, multiple sclerosis who presents to the ED with epigastric abdominal pain with associated nausea.  Vitals grossly unremarkable on my evaluation.  Abdomen soft on examination tender to palpation epigastric region left upper quadrant.  No rebound or guarding.  No CVA tenderness.  IV had been established in triage and laboratory studies had already  been obtained which were grossly unremarkable, specifically normal LFTs, normal WBC, and normal lipase as well as normal creatinine.  Troponin added onto this.  CT abdomen pelvis ordered.  Patient medicated morphine, Zofran, 1 L of IV fluids.  On reevaluation she states that her pain has improved, however she is still feeling mildly nauseous and was given a second dose of Zofran.  Laboratory studies grossly unremarkable, troponin within normal limits.  CT abdomen pelvis showed mild fluid distention of the stomach and multiple distal small loops within the right lower quadrant/pelvis suggestive of mild enteritis without any evidence of bowel obstruction or focal inflammatory changes.  I discussed these results with the patient.  At this time she was able to tolerate p.o. intake.  I recommended bowel rest as well as oral fluids, Zofran/Bentyl as needed for her symptoms.  She was agreeable to this plan.  She was advised to follow close with her primary care provider.  She was given signs and symptoms to return to the ED with and was discharged from emergency department in stable condition.  She had no further questions at time of discharge.    Amount and/or Complexity of Data Reviewed  Labs: ordered.  Radiology: ordered and independent interpretation performed.     Details: CT of the abdomen pelvis with out any evidence of SBO         Diagnoses as of 01/02/24 2331   Enteritis       Procedures    SANDRO Winston, ANJANA Correa PA-C  01/02/24 2337

## 2024-01-05 ENCOUNTER — SPECIALTY PHARMACY (OUTPATIENT)
Dept: PHARMACY | Facility: CLINIC | Age: 59
End: 2024-01-05

## 2024-01-05 PROCEDURE — RXMED WILLOW AMBULATORY MEDICATION CHARGE

## 2024-01-05 RX ORDER — HYDROXYCHLOROQUINE SULFATE 200 MG/1
200 TABLET, FILM COATED ORAL DAILY
COMMUNITY

## 2024-01-05 NOTE — PROGRESS NOTES
[facility] Clinical Note    Una Gallegos is a 58 y.o. female, who is on the specialty pharmacy service for management of: Multiple active episodes found with status of: (Multiple active episodes found)     Una was contacted on 1/5/2024.    Refer to the encounter summary report for documentation details about patient counseling and education.      Medication Adherence  The importance of adherence was discussed with the patient and they were advised to take the medication as prescribed by their provider. Una was encouraged to call her physician's office if they have a question regarding a missed dose.     Conclusion  Rate your quality of life on scale of 1-10: 8  Rate your satisfaction with  Specialty Pharmacy on scale of 1-10: 10 - Completely satisfied      Patient advised to contact the pharmacy if there are any changes to her medication list, including prescriptions, OTC medications, herbal products, or supplements. Patient was advised of [facility]’s dispensing process, refill timeline, contact information ([phone]), and patient management follow up. Patient confirmed understanding of education conducted during assessment. All patient questions and concerns were addressed to the best of my ability. Patient was encouraged to contact the specialty pharmacy with any questions or concerns.    Confirmed follow-up outreaches are properly scheduled. Reviewed goals of therapy in the program targets.    Carmelita Kendrick, MiD

## 2024-01-06 ENCOUNTER — PHARMACY VISIT (OUTPATIENT)
Dept: PHARMACY | Facility: CLINIC | Age: 59
End: 2024-01-06
Payer: MEDICAID

## 2024-01-10 ENCOUNTER — OFFICE VISIT (OUTPATIENT)
Dept: NEUROSURGERY | Facility: HOSPITAL | Age: 59
End: 2024-01-10
Payer: COMMERCIAL

## 2024-01-10 VITALS
RESPIRATION RATE: 18 BRPM | DIASTOLIC BLOOD PRESSURE: 75 MMHG | BODY MASS INDEX: 30.45 KG/M2 | WEIGHT: 194 LBS | HEART RATE: 84 BPM | HEIGHT: 67 IN | SYSTOLIC BLOOD PRESSURE: 127 MMHG

## 2024-01-10 DIAGNOSIS — G95.9 CERVICAL MYELOPATHY (MULTI): Primary | ICD-10-CM

## 2024-01-10 PROCEDURE — 3008F BODY MASS INDEX DOCD: CPT | Performed by: NEUROLOGICAL SURGERY

## 2024-01-10 PROCEDURE — 99212 OFFICE O/P EST SF 10 MIN: CPT | Performed by: NEUROLOGICAL SURGERY

## 2024-01-10 NOTE — PROGRESS NOTES
2- C4-6 Fusion. Today is having burning pain in the shoulders, mid back and lower back.  Has numbness in the left arm , elbow to the hand. Also in the left leg. PT last year, and cortisone in November.     58-year-old woman with history of multiple sclerosis and posterior cervical decompression and fusion returns for follow-up.  Recently, she has had worsening pain running from her neck down her back and cold sensation in her arms.  This is also been associated with some numbness in her arms.  She feels that her balance is off.    On exam, the patient is alert and interactive.  Her incision is well-healed.  She moves all extremities with 4 out of 5 strength and seems to have some increased tone in her lower extremities.    CAT scan of her entire spine I personally reviewed demonstrates postsurgical changes in the cervical region with good fusion from C4-6 where her instrumentation is.  Soft tissues are not well-evaluated by the CT modality.    At this time the etiology the patient's symptoms is not clear.  I would like to obtain a new MRI of her cervical and thoracic spine to look for any evidence of spinal cord or nerve root compression that might explain her symptoms.

## 2024-01-11 DIAGNOSIS — G35 MULTIPLE SCLEROSIS (MULTI): ICD-10-CM

## 2024-01-11 RX ORDER — GABAPENTIN 400 MG/1
800 CAPSULE ORAL 3 TIMES DAILY
Qty: 180 CAPSULE | Refills: 0 | Status: SHIPPED | OUTPATIENT
Start: 2024-01-11 | End: 2024-02-15

## 2024-01-16 ENCOUNTER — HOSPITAL ENCOUNTER (OUTPATIENT)
Dept: RADIOLOGY | Facility: HOSPITAL | Age: 59
Discharge: HOME | End: 2024-01-16
Payer: COMMERCIAL

## 2024-01-16 ENCOUNTER — OFFICE VISIT (OUTPATIENT)
Dept: ORTHOPEDIC SURGERY | Facility: HOSPITAL | Age: 59
End: 2024-01-16
Payer: COMMERCIAL

## 2024-01-16 DIAGNOSIS — S82.892D CLOSED FRACTURE OF LEFT ANKLE WITH ROUTINE HEALING, SUBSEQUENT ENCOUNTER: ICD-10-CM

## 2024-01-16 DIAGNOSIS — T84.84XA PAINFUL ORTHOPAEDIC HARDWARE (CMS-HCC): Primary | ICD-10-CM

## 2024-01-16 DIAGNOSIS — M25.672 ANKLE STIFF, LEFT: ICD-10-CM

## 2024-01-16 PROCEDURE — 99214 OFFICE O/P EST MOD 30 MIN: CPT | Performed by: ORTHOPAEDIC SURGERY

## 2024-01-16 PROCEDURE — 73610 X-RAY EXAM OF ANKLE: CPT | Mod: LT

## 2024-01-16 PROCEDURE — 73610 X-RAY EXAM OF ANKLE: CPT | Mod: LEFT SIDE | Performed by: RADIOLOGY

## 2024-01-16 PROCEDURE — 3008F BODY MASS INDEX DOCD: CPT | Performed by: ORTHOPAEDIC SURGERY

## 2024-01-16 NOTE — PROGRESS NOTES
Subjective    Patient ID: Una Gallegos is a 58 y.o. female.    Chief Complaint: No chief complaint on file.     Last Surgery: ORIF L tibial plafond w/ fibula and ankle spanning exfix removal performed on 2/17/22. Ex fix was initially placed on 1/30/22.     HPI  Patient is doing relatively well.  She is ambulating independently in regular shoe.  However patient continues to complain of pain over the hardware.  There is some stiffness around the ankle.  Pain is worse with activities better with rest.  Patient have tried using compression stockings and stretching exercises.  She is completing a physical therapy and also tried over-the-counter medication without improvement in pain.  At this point patient believes that the pain is over the hardware and she would like to have the hardware removed.  She also reports stiffness of her ankle is limiting her function.    Objective   Ortho Exam  Gen: The patient is alert and oriented ×3, is in no acute distress, and appear their stated age and weight.    I personally reviewed and examined the left ankle.  Incisions are well-healed.  Patient had tenderness to palpation over the medial and lateral ankle hardware.  Patient is able to perform ankle range of motion however she has limited ankle dorsiflexion.  Unable to get her to about neutral dorsiflexion passively and actively she lacks about 5 degrees from neutral.  Normal sensation in the foot.  The foot is well-perfused.    Image Results:  XR ankle left 3+ views  Narrative: Interpreted By:  Madhu Rod,  and Omayra Pearson   STUDY:  XR ANKLE LEFT 3+ VIEWS;  1/16/2024 9:44 am      INDICATION:  Signs/Symptoms:s/p fracture.      COMPARISON:  Ankle radiograph 03/14/2023, ankle radiograph 02/12/2022, ankle  radiograph 01/29/2022.      ACCESSION NUMBER(S):  [id number]      ORDERING CLINICIAN:  POOJA LOW      FINDINGS:  3 radiographic views provided.      Status post distal tibia and fibula internal fixation, unchanged  from  prior exam without evidence of hardware complication. There is  appropriate alignment. Achilles enthesophyte noted.  There is no radiographic evidence of an acute fracture or dislocation  of the ankle. The extra-articular soft tissues, including the outline  of the Achilles tendon, are unremarkable. There is no distention of  the anterior ankle joint recess.      Impression: 1. Unchanged postsurgical changes of distal tibia and fibula internal  fixation without evidence of hardware complication.      I personally reviewed the images/study and I agree with the findings  as stated by Dr. Michel Cutler. This study was interpreted at  [place], [place].      MACRO:  None      Signed by: Madhu Rod 1/16/2024 11:42 AM  Dictation workstation:   [id number]      Assessment/Plan   Encounter Diagnoses:  Painful orthopaedic hardware (CMS/HCC)    Closed fracture of left ankle with routine healing, subsequent encounter    Ankle stiff, left  Patient has union of her fracture.  However she has ongoing persistent pain over the hardware and ankle stiffness.  Patient has tried conservative treatment including stretching exercises and over-the-counter anti-inflammatory medication with significant improvement.  I discussed options with her at this point and she elected to proceed with hardware removal and ankle manipulation under anesthesia at the same time.  Patient understand that this is outpatient surgery and she will need to have someone bring her take a home the same day.  She will be weightbearing as tolerated postoperatively however no high-impact activities to minimize risk of periimplant fracture for 6 weeks.  Patient will be referred to our preoperative anesthesia clinic for evaluation prior to proceeding.  Should be scheduled at the beginning of February.  Orders Placed This Encounter    Request for Pre-Admission Testing Visit    XR ankle left 3+ views    Case Request  Operating Room: Left Removal Hardware Lower Extremity Ankle     No follow-ups on file.

## 2024-01-25 ENCOUNTER — APPOINTMENT (OUTPATIENT)
Dept: RADIOLOGY | Facility: HOSPITAL | Age: 59
End: 2024-01-25
Payer: COMMERCIAL

## 2024-01-26 ENCOUNTER — PRE-ADMISSION TESTING (OUTPATIENT)
Dept: PREADMISSION TESTING | Facility: HOSPITAL | Age: 59
End: 2024-01-26
Payer: COMMERCIAL

## 2024-01-26 VITALS
HEIGHT: 67 IN | SYSTOLIC BLOOD PRESSURE: 129 MMHG | DIASTOLIC BLOOD PRESSURE: 92 MMHG | BODY MASS INDEX: 30.28 KG/M2 | OXYGEN SATURATION: 96 % | HEART RATE: 94 BPM | WEIGHT: 192.9 LBS | TEMPERATURE: 98.1 F

## 2024-01-26 DIAGNOSIS — T84.84XA PAINFUL ORTHOPAEDIC HARDWARE (CMS-HCC): ICD-10-CM

## 2024-01-26 DIAGNOSIS — M25.672 ANKLE STIFF, LEFT: ICD-10-CM

## 2024-01-26 DIAGNOSIS — S82.892D CLOSED FRACTURE OF LEFT ANKLE WITH ROUTINE HEALING, SUBSEQUENT ENCOUNTER: ICD-10-CM

## 2024-01-26 PROCEDURE — 87081 CULTURE SCREEN ONLY: CPT

## 2024-01-26 PROCEDURE — 99214 OFFICE O/P EST MOD 30 MIN: CPT | Performed by: NURSE PRACTITIONER

## 2024-01-26 RX ORDER — CHLORHEXIDINE GLUCONATE ORAL RINSE 1.2 MG/ML
SOLUTION DENTAL
Qty: 15 ML | Refills: 0 | Status: SHIPPED | OUTPATIENT
Start: 2024-01-26 | End: 2024-02-08 | Stop reason: HOSPADM

## 2024-01-26 RX ORDER — CHLORHEXIDINE GLUCONATE 40 MG/ML
SOLUTION TOPICAL
Qty: 473 ML | Refills: 0 | Status: SHIPPED | OUTPATIENT
Start: 2024-01-26 | End: 2024-02-08 | Stop reason: HOSPADM

## 2024-01-26 ASSESSMENT — DUKE ACTIVITY SCORE INDEX (DASI)
CAN YOU DO YARD WORK LIKE RAKING LEAVES, WEEDING OR PUSHING A MOWER: NO
CAN YOU DO LIGHT WORK AROUND THE HOUSE LIKE DUSTING OR WASHING DISHES: YES
CAN YOU DO HEAVY WORK AROUND THE HOUSE LIKE SCRUBBING FLOORS OR LIFTING AND MOVING HEAVY FURNITURE: NO
CAN YOU WALK A BLOCK OR TWO ON LEVEL GROUND: YES
CAN YOU HAVE SEXUAL RELATIONS: NO
CAN YOU TAKE CARE OF YOURSELF (EAT, DRESS, BATHE, OR USE TOILET): YES
DASI METS SCORE: 4
TOTAL_SCORE: 9.95
CAN YOU DO MODERATE WORK AROUND THE HOUSE LIKE VACUUMING, SWEEPING FLOORS OR CARRYING GROCERIES: NO
CAN YOU CLIMB A FLIGHT OF STAIRS OR WALK UP A HILL: NO
CAN YOU RUN A SHORT DISTANCE: NO
CAN YOU WALK INDOORS, SUCH AS AROUND YOUR HOUSE: YES
CAN YOU PARTICIPATE IN MODERATE RECREATIONAL ACTIVITIES LIKE GOLF, BOWLING, DANCING, DOUBLES TENNIS OR THROWING A BASEBALL OR FOOTBALL: NO
CAN YOU PARTICIPATE IN STRENOUS SPORTS LIKE SWIMMING, SINGLES TENNIS, FOOTBALL, BASKETBALL, OR SKIING: NO

## 2024-01-26 ASSESSMENT — CHADS2 SCORE
PRIOR STROKE OR TIA OR THROMBOEMBOLISM: NO
CHADS2 SCORE: 2
CHF: NO
DIABETES: YES
HYPERTENSION: YES
AGE GREATER THAN OR EQUAL TO 75: NO

## 2024-01-26 ASSESSMENT — ENCOUNTER SYMPTOMS
ARTHRALGIAS: 1
RESPIRATORY NEGATIVE: 1
ENDOCRINE NEGATIVE: 1
GASTROINTESTINAL NEGATIVE: 1
NEUROLOGICAL NEGATIVE: 1
EYES NEGATIVE: 1
NECK NEGATIVE: 1
CARDIOVASCULAR NEGATIVE: 1
CONSTITUTIONAL NEGATIVE: 1

## 2024-01-26 ASSESSMENT — LIFESTYLE VARIABLES: SMOKING_STATUS: SMOKER

## 2024-01-26 NOTE — CPM/PAT H&P
CPM/PAT Evaluation       Name: Una Gallegos (Una Gallegos)  /Age: 1965/58 y.o.     Visit Type:   In-Person       Chief Complaint: left ankle pain    HPI  The patient is a 58 year old female with complaints of left ankle pain. She is s/p  ORIF L tibial plafond w/ fibula and ankle spanning exfix removal performed on 22. Ex fix was initially placed on 22. She presents today for perioperative evaluation in anticipation of Left Removal Hardware Lower Extremity Ankle on 24 with Dr. Collier.    Past Medical History:   Diagnosis Date    Asthma     Chronic headaches     Migraines    COVID-19     COVID + 2023    Depression     Diabetes mellitus (CMS/HCC)     10/23/23 A1C 6.0    Encounter for electrocardiogram 2024    EKG on 24    Fibromyalgia, primary     Fractures     Painful orthopaedic Closed fracture of left ankle with routine healing,    Gastritis     History of blood transfusion     Several years ago    Hyperlipidemia     Hypertension     Joint pain     Lupus (CMS/[facility])     MS (multiple sclerosis) (CMS/[facility])     F/W Dr. Silvia Molina LOV 2023    Painful orthopaedic hardware (CMS/[facility])     Ortho: Akil Collier    Personal history of other diseases of the musculoskeletal system and connective tissue     Personal history of fibromyalgia    Seizure disorder (CMS/[facility])     Last occurence in     Stroke (CMS/[facility])     CVA in        Past Surgical History:   Procedure Laterality Date    APPENDECTOMY  2014    Appendectomy    CERVICAL FUSION       SECTION, CLASSIC       Section x2    COLONOSCOPY      CT ANGIO NECK  12/10/2019    CT NECK ANGIO W AND WO IV CONTRAST 12/10/2019 [facility] EMERGENCY LEGACY    CT ANGIO NECK  2022    CT NECK ANGIO W AND WO IV CONTRAST 2022 [facility] ANCILLARY LEGACY    CT HEAD ANGIO W AND WO IV CONTRAST  12/10/2019    CT HEAD ANGIO W AND WO IV CONTRAST 12/10/2019 [facility] EMERGENCY LEGACY    CT HEAD ANGIO W AND WO IV CONTRAST  2022     CT HEAD ANGIO W AND WO IV CONTRAST 1/13/2022 [facility] ANCILLARY LEGACY    HYSTERECTOMY  10/05/2015    Hysterectomy    MR HEAD ANGIO WO IV CONTRAST  08/26/2020    MR HEAD ANGIO WO IV CONTRAST 8/26/2020 [facility] EMERGENCY LEGACY    MR HEAD ANGIO WO IV CONTRAST  09/21/2021    MR HEAD ANGIO WO IV CONTRAST 9/21/2021 [facility] EMERGENCY LEGACY    MR NECK ANGIO WO IV CONTRAST  08/26/2020    MR NECK ANGIO WO IV CONTRAST 8/26/2020 [facility] EMERGENCY LEGACY    MR NECK ANGIO WO IV CONTRAST  09/21/2021    MR NECK ANGIO WO IV CONTRAST 9/21/2021 [facility] EMERGENCY LEGACY    ORIF TIBIA FRACTURE         Patient Sexual activity questions deferred to the physician.    Family History   Problem Relation Name Age of Onset    Hypertension Mother      Cancer Father      Eating disorder Sister      Other (hoarding behavior) Sister      Lupus Mother's Sister          Systemic    Rheum arthritis Other Aunt        No Known Allergies    Prior to Admission medications    Medication Sig Start Date End Date Taking? Authorizing Provider   acetaminophen (Tylenol) 325 mg tablet Take 500 mg by mouth every 6 hours if needed for mild pain (1 - 3).    Historical Provider, MD   albuterol 90 mcg/actuation inhaler Inhale. 6/4/21   Historical Provider, MD   ascorbic acid (Vitamin C) 250 MG chewable tablet Chew 1 tablet (250 mg) once daily.    Historical Provider, MD   aspirin 81 mg EC tablet Take by mouth. 2/22/22   Historical Provider, MD   atorvastatin (Lipitor) 80 mg tablet Take 1 tablet (80 mg) by mouth once daily at bedtime. 6/27/23   Kobe Harris MD   azelastine (Astelin) 137 mcg (0.1 %) nasal spray Administer 2 sprays into affected nostril(s) 2 times a day. 9/25/19   Historical Provider, MD   dicyclomine (Bentyl) 20 mg tablet Take 1 tablet (20 mg) by mouth 4 times a day as needed (abdominal pain) for up to 20 doses. 1/2/24   Ashli Correa PA-C   dimethyl fumarate (Tecfidera) 240 mg capsule,delayed release(DR/EC) capsule TAKE ONE (1) CAPSULE BY MOUTH TWICE DAILY. SWALLOW  WHOLE. DO NOT OPEN CAPSULE. DO NOT CRUSH OR CHEW. 6/30/23 6/29/24  Silvia Molina, APRN-CNP   docusate sodium (Colace) 100 mg capsule Take 1 capsule (100 mg) by mouth twice a day. 11/30/21   Historical Provider, MD   escitalopram (Lexapro) 10 mg tablet Take 1 tablet (10 mg) by mouth once daily. 4/27/23 12/1/23  Scott Stokes MD   folic acid (Folvite) 1 mg tablet Take 1 tablet (1 mg) by mouth once daily. 5 capsules    Historical Provider, MD   furosemide (Lasix) 10 mg/mL solution Take 1 mL (10 mg) by mouth once daily.    Historical Provider, MD   gabapentin (Neurontin) 400 mg capsule Take 2 capsules (800 mg) by mouth 3 times a day. 1/11/24 2/10/24  Scott Stokes MD   hydroxychloroquine (Plaquenil) 200 mg tablet Take 1 tablet (200 mg) by mouth once daily.    Historical Provider, MD   ibuprofen 800 mg tablet Take 1 tablet (800 mg) by mouth every 8 hours if needed for mild pain (1 - 3).    Historical Provider, MD   levETIRAcetam (Keppra) 500 mg tablet Take 1 tablet (500 mg) by mouth 2 times a day. 10/27/23 1/25/24  Scott Stokes MD   metFORMIN (Glucophage) 500 mg tablet TAKE 1 TABLET (500 MG) BY MOUTH ONCE DAILY WITH A MEAL. 4/28/23 4/27/24  Scott Stokes MD   metoprolol succinate XL (Toprol-XL) 50 mg 24 hr tablet Take 1 tablet (50 mg) by mouth once daily. Do not crush or chew.    Historical Provider, MD   mv-min/folic/vit K/lycop/coQ10 (DAILY MULTIVITAMIN ORAL) Take 1 tablet by mouth in the morning. 12/4/19   Historical Provider, MD   nitroglycerin (Nitrostat) 0.4 mg SL tablet Place 1 tablet (0.4 mg) under the tongue. Every 5 minutes for up to 3 doses as needed for chest pain. Call 911 if pain persists. 5/19/21   Historical Provider, MD   ondansetron ODT (Zofran-ODT) 4 mg disintegrating tablet Take 1 tablet (4 mg) by mouth every 8 hours if needed for nausea or vomiting. 1/2/24   Ashli Correa PA-C   oxyCODONE-acetaminophen (Percocet) 5-325 mg tablet Take 1 tablet by mouth once daily.    Historical Provider, MD    Purelax 17 gram/dose powder MIX 1 CAPFUL(17GM) IN 8 OUNCES OF WATER, JUICE, OR TEASPOONFUL AND DRINK DAILY.  Patient not taking: Reported on 1/10/2024 12/28/23   Scott Stokes MD   rimegepant (Nurtec ODT) 75 mg tablet,disintegrating Dissolve 1 tablet (75 mg) by mouth on or under the tongue once daily as needed at the start of a migraine. Max 1 tablet per 24 hours. 11/30/23   Silvia Molina, APRN-CNP   sennosides (Senokot) 8.6 mg tablet Take 1 tablet (8.6 mg) by mouth if needed for constipation. 3/11/22   Historical Provider, MD   topiramate (Topamax) 100 mg tablet TAKE 1 TABLET BY MOUTH TWICE A DAY 11/10/23   Scott Stokes MD        PAT ROS:   Constitutional:   neg    Neuro/Psych:   neg    Eyes:   neg    Ears:   neg    Nose:   neg    Mouth:   neg    Throat:   neg    Neck:   neg    Cardio:    Left ankle  neg     peripheral edema  Respiratory:   neg    Endocrine:   neg    GI:   neg    :   neg    Musculoskeletal:    arthralgias  Hematologic:   neg    Skin:  neg        Physical Exam  Vitals reviewed.   Constitutional:       Appearance: Normal appearance.   HENT:      Head: Normocephalic and atraumatic.      Nose: Nose normal.      Mouth/Throat:      Mouth: Mucous membranes are moist.      Pharynx: Oropharynx is clear.   Eyes:      Extraocular Movements: Extraocular movements intact.      Conjunctiva/sclera: Conjunctivae normal.      Pupils: Pupils are equal, round, and reactive to light.   Cardiovascular:      Rate and Rhythm: Normal rate and regular rhythm.      Pulses: Normal pulses.      Heart sounds: Normal heart sounds.   Pulmonary:      Effort: Pulmonary effort is normal.      Breath sounds: Normal breath sounds.   Musculoskeletal:         General: Normal range of motion.      Cervical back: Normal range of motion.   Skin:     General: Skin is warm and dry.   Neurological:      General: No focal deficit present.      Mental Status: She is alert and oriented to person, place, and time.      Gait: Gait  abnormal.   Psychiatric:         Mood and Affect: Mood normal.         Behavior: Behavior normal.          PAT AIRWAY:   Airway:     Mallampati::  IV    TM distance::  >3 FB    Neck ROM::  Full  normal        Visit Vitals  BP (!) 129/92   Pulse 94   Temp 36.7 °C (98.1 °F) (Oral)       DASI Risk Score      Flowsheet Row Most Recent Value   DASI SCORE 9.95   METS Score (Will be calculated only when all the questions are answered) 4          Caprini DVT Assessment      Flowsheet Row Most Recent Value   DVT Score 10   Current Status Major surgery planned, including arthroscopic and laproscopic (1-2 hours)   History Prior major surgery, Hip, pelvis or leg fracture   Age 40-59 years   BMI 30 or less          Modified Frailty Index      Flowsheet Row Most Recent Value   Modified Frailty Index Calculator .1818          CHADS2 Stroke Risk  Current as of about an hour ago        N/A 3 - 100%: High Risk   2 - 3%: Medium Risk   0 - 2%: Low Risk     Last Change: N/A          This score determines the patient's risk of having a stroke if the patient has atrial fibrillation.        This score is not applicable to this patient. Components are not calculated.          Revised Cardiac Risk Index      Flowsheet Row Most Recent Value   Revised Cardiac Risk Calculator 0          Apfel Simplified Score      Flowsheet Row Most Recent Value   Apfel Simplified Score Calculator 2          Risk Analysis Index Results This Encounter    No data found in the last 1 encounters.       Stop Bang Score      Flowsheet Row Most Recent Value   Do you snore loudly? 0   Do you often feel tired or fatigued after your sleep? 0   Has anyone ever observed you stop breathing in your sleep? 0   Do you have or are you being treated for high blood pressure? 0   Recent BMI (Calculated) 30.4   Is BMI greater than 35 kg/m2? 0=No   Age older than 50 years old? 1=Yes   Is your neck circumference greater than 17 inches (Male) or 16 inches (Female)? 0   Gender - Male  0=No   STOP-BANG Total Score 1          Recent Results (from the past 168 hour(s))   Staphylococcus aureus/MRSA colonization, Culture    Collection Time: 01/26/24  2:57 PM    Specimen: Anterior Nares; Swab   Result Value Ref Range    Staph/MRSA Screen Culture No Staphylococcus aureus isolated         Diagnostic Results          TRANSTHORACIC ECHOCARDIOGRAM REPORT  Study Date:       5/18/2023   CONCLUSIONS:  1. Left ventricular systolic function is normal with a 60-65% estimated ejection fraction.  2. Spectral Doppler shows an impaired relaxation pattern of left ventricular diastolic filling.  3. RVSP within normal limits.    Lower Venous Duplex Ultrasound  Study Date:       3/11/2022   CONCLUSIONS:  Right Lower Venous: Right common femoral vein is negative for deep vein thrombus.  Left Lower Venous: No evidence of acute deep vein thrombus visualized in the left lower extremity.    Exercise Stress Echo   Study Date:       6/2/2021      Summary:   1. No ECG or echocardiographic evidence of ischemia.   2. Normal global left ventricular systolic function.   3. The resting ejection fraction was estimated at 65%% with a peak exercise ejection fraction estimated at >75%.   4. RV systolic pressure increased from 27 mmHg to 40 mmHg, consistent with mild exercise induced pulmonary HTN.   5. The adequate level of stress was achieved.      Assessment and Plan:     Anesthesia:  The patient denies problems with anesthesia in the past such as PONV, prolonged sedation, awareness, dental damage, aspiration, cardiac arrest, difficult intubation, or unexpected hospital admissions.     Neuro:   The patient has history of migraines, CVA, multiple sclerosis and posterior cervical decompression and fusion followed by neurology. She is followed by Silvia Molina CNP for MS, last seen 11/20/23. She is also followed by pain management, last epidural steroid injection 12/1/23. She is at increased risk for postoperative delirium secondary to  depression. The patient is at increased risk for perioperative stroke secondary to hypertension , hyperlipidemia, female gender. Handouts for preoperative brain exercises given to patient.history of multiple sclerosis and posterior cervical decompression and fusion followed by neurology Dr. Stock, last seen 1/10/24    HEENT/Airway  The patient has diagnoses, significant findings on chart review, clinical presentation or evaluation of limited neck extension, prior neck surgery, fusion/fixation. No documented or reported history of airway difficulty.     Cardiovascular  The patient is scheduled for non-cardiac surgery associated with elevated risk.  The patient has no major cardiac contraindications to non- cardiac surgery.  RCRI  The patient meets 0-1 RCRI criteria and therefore has a less than 1% risk of major adverse cardiac complications.  METS  The patient's functional capacity capacity is greater than 4 METS.  EKG  The patient has no EKG or echocardiographic changes concerning for myocardial ischemia.   Heart Failure  The patient has no known history of heart failure.  Additionally, the patient reports no symptoms of heart failure and demonstrates no signs of heart failure.  Hypertension Evaluation  The patient has a known history of hypertension that is controlled.  Patient's hypertension is most consistent with stage 1.  Heart Rhythm Evaluation  The patient has no history of arrhythmias.  Heart Valve Evaluation  The patient has no known history of valvular heart disease. The patient has no symptoms or physical exam findings to suggest valvular heart disease.  CARDS EVAL  The patient is not followed by cardiology.  The patient has a 30-day risk for MACE of 0 predictors, 3.9% risk for cardiac death, nonfatal myocardial infarction, and nonfatal cardiac arrest.  SINDY score which indicates a 0.1% risk of intraoperative or 30-day postoperative.    Pulmonary   No significant findings on chart review or clinical  presentation and evaluation.  The patient has a stop bang score of 1, which places patient at low risk for having RUBEN.    ARISCAT 3, low, 1.6% risk of in-hospital postoperative pulmonary complications  PRODIGY 3, low risk of respiratory depression episode.    Hematology  No diagnoses or significant findings on chart review or clinical presentation and evaluation.  Antiplatelet management   The patient is currently receiving antiplatelet therapy for history of CVA/TIA., The patient was instructed to continue in the perioperative period.  Anticoagulation management  The patient is not currently receiving anticoagulation therapy. Patient provided with DVT educational handout.    Caprini score 10, high risk of perioperative VTE    Gastrointestinal  No diagnoses or significant findings on chart review or clinical presentation and evaluation.  Eat 10- 0,  self-perceived oropharyngeal dysphagia scale (0-40)     Genitourinary  No diagnoses or significant findings on chart review or clinical presentation and evaluation.    Renal  The patient has no known history of chronic kidney disease. No renal diagnoses or significant findings on chart review or clinical presentation and evaluation. The patient has specific risk factors associated with increased risk of perioperative renal complications due to age greater than 55, hypertension, diabetes mellitus, cerebrovascular disease. Preventative measures include preoperative hydration.    Musculoskeletal  The patient has diagnoses or significant findings on chart review or clinical presentation and evaluation significant for lupus (followed by Dr. Charlotte Rivera), left ankle pain scheduled for hardware removal on 2/8/24 with Dr. Collier.    Endocrine  Diabetes Evaluation  The patient has history of diabetes mellitus controlled by oral medication, last hgba1c 6.0 on 10/23/243  Thyroid Disease Evaluation  The patient has no history of thyroid disease.    ID  No diagnoses or significant  findings on chart review or clinical presentation and evaluation., MRSA screening obtained. Prescriptions given for Hibiclens and Peridex.    -Preoperative medication instructions were provided and reviewed with the patient.  Any additional testing or evaluation was explained to the patient.  NPO Instructions were discussed, and the patient's questions were answered prior to conclusion of this encounter

## 2024-01-26 NOTE — PREPROCEDURE INSTRUCTIONS
NPO Instructions:    Do not eat any food after midnight the night before your surgery/procedure.  You may have up to TEN ounces of clear liquids until TWO hours before your instructed arrival time to the hospital. This includes water, black tea/coffee, (no milk or cream), apple juice, and/or electrolyte drinks (Gatorade).  You may chew gum up to TWO hours before your surgery/procedure.    Additional Instructions:     We have sent a prescription for Hibiclens soap and Peridex mouth wash to your preferred pharmacy.  If you have not already, Please  your prescription and start using as directed before surgery.  Follow the instruction sheet provided to you at your CPM/PAT appointment.    Avoid herbal supplements, multivitamins and NSAIDS (non-steroidal anti-inflammatory drugs) such as Advil, Aleve, Ibuprofen, Naproxen, Excedrin, Meloxicam or Celebrex for at least 7 days prior to surgery. May take Tylenol as needed.    Avoid tobacco and alcohol products for 24 hours prior to surgery.    Seven/Six Days before Surgery:  Review your medication instructions, stop indicated medications    Day of Surgery:  Review your medication instructions, take indicated medications  Wear comfortable loose fitting clothing  Do not use moisturizers, creams, lotions or perfume  All jewelry and valuables should be left at home    Katerin Matamoros [facility]  [facility]  [phone]-[phone]  [phone]  [email or web address]

## 2024-01-28 DIAGNOSIS — G40.909 SEIZURE DISORDER (MULTI): ICD-10-CM

## 2024-01-28 LAB — STAPHYLOCOCCUS SPEC CULT: NORMAL

## 2024-01-30 RX ORDER — LEVETIRACETAM 500 MG/1
500 TABLET ORAL 2 TIMES DAILY
Qty: 180 TABLET | Refills: 0 | Status: SHIPPED | OUTPATIENT
Start: 2024-01-30 | End: 2024-04-15

## 2024-01-31 ENCOUNTER — SPECIALTY PHARMACY (OUTPATIENT)
Dept: PHARMACY | Facility: CLINIC | Age: 59
End: 2024-01-31

## 2024-01-31 PROCEDURE — RXMED WILLOW AMBULATORY MEDICATION CHARGE

## 2024-01-31 RX ORDER — DIMETHYL FUMARATE 240 MG/1
CAPSULE ORAL
Qty: 180 CAPSULE | Refills: 1 | Status: CANCELLED | OUTPATIENT
Start: 2024-01-31 | End: 2025-01-30

## 2024-02-02 ENCOUNTER — SPECIALTY PHARMACY (OUTPATIENT)
Dept: PHARMACY | Facility: CLINIC | Age: 59
End: 2024-02-02

## 2024-02-02 ENCOUNTER — PHARMACY VISIT (OUTPATIENT)
Dept: PHARMACY | Facility: CLINIC | Age: 59
End: 2024-02-02
Payer: MEDICAID

## 2024-02-02 DIAGNOSIS — G35 MULTIPLE SCLEROSIS (MULTI): Primary | ICD-10-CM

## 2024-02-02 RX ORDER — DIMETHYL FUMARATE 240 MG/1
CAPSULE ORAL
Qty: 180 CAPSULE | Refills: 1 | Status: SHIPPED | OUTPATIENT
Start: 2024-02-02 | End: 2025-02-01

## 2024-02-04 ENCOUNTER — APPOINTMENT (OUTPATIENT)
Dept: RADIOLOGY | Facility: HOSPITAL | Age: 59
End: 2024-02-04
Payer: COMMERCIAL

## 2024-02-05 PROCEDURE — RXMED WILLOW AMBULATORY MEDICATION CHARGE

## 2024-02-07 ENCOUNTER — ANESTHESIA EVENT (OUTPATIENT)
Dept: OPERATING ROOM | Facility: HOSPITAL | Age: 59
End: 2024-02-07
Payer: COMMERCIAL

## 2024-02-08 ENCOUNTER — APPOINTMENT (OUTPATIENT)
Dept: RADIOLOGY | Facility: HOSPITAL | Age: 59
End: 2024-02-08
Payer: COMMERCIAL

## 2024-02-08 ENCOUNTER — ANESTHESIA (OUTPATIENT)
Dept: OPERATING ROOM | Facility: HOSPITAL | Age: 59
End: 2024-02-08
Payer: COMMERCIAL

## 2024-02-08 ENCOUNTER — HOSPITAL ENCOUNTER (OUTPATIENT)
Facility: HOSPITAL | Age: 59
Setting detail: OUTPATIENT SURGERY
Discharge: HOME | End: 2024-02-08
Attending: ORTHOPAEDIC SURGERY | Admitting: ORTHOPAEDIC SURGERY
Payer: COMMERCIAL

## 2024-02-08 VITALS
RESPIRATION RATE: 16 BRPM | HEIGHT: 67 IN | HEART RATE: 80 BPM | DIASTOLIC BLOOD PRESSURE: 60 MMHG | TEMPERATURE: 97.3 F | WEIGHT: 195.11 LBS | SYSTOLIC BLOOD PRESSURE: 145 MMHG | OXYGEN SATURATION: 95 % | BODY MASS INDEX: 30.62 KG/M2

## 2024-02-08 DIAGNOSIS — T84.84XA PAINFUL ORTHOPAEDIC HARDWARE (CMS-HCC): Primary | ICD-10-CM

## 2024-02-08 DIAGNOSIS — M25.672 ANKLE STIFF, LEFT: ICD-10-CM

## 2024-02-08 PROBLEM — J18.9 CAP (COMMUNITY ACQUIRED PNEUMONIA): Status: RESOLVED | Noted: 2023-01-23 | Resolved: 2024-02-08

## 2024-02-08 LAB
GLUCOSE BLD MANUAL STRIP-MCNC: 105 MG/DL (ref 74–99)
GLUCOSE BLD MANUAL STRIP-MCNC: 84 MG/DL (ref 74–99)

## 2024-02-08 PROCEDURE — 2500000004 HC RX 250 GENERAL PHARMACY W/ HCPCS (ALT 636 FOR OP/ED): Mod: SE | Performed by: ANESTHESIOLOGY

## 2024-02-08 PROCEDURE — 2500000005 HC RX 250 GENERAL PHARMACY W/O HCPCS: Mod: SE | Performed by: ANESTHESIOLOGIST ASSISTANT

## 2024-02-08 PROCEDURE — 27860 FIXATION OF ANKLE JOINT: CPT

## 2024-02-08 PROCEDURE — 7100000010 HC PHASE TWO TIME - EACH INCREMENTAL 1 MINUTE: Performed by: ORTHOPAEDIC SURGERY

## 2024-02-08 PROCEDURE — 2500000004 HC RX 250 GENERAL PHARMACY W/ HCPCS (ALT 636 FOR OP/ED): Mod: SE | Performed by: ANESTHESIOLOGIST ASSISTANT

## 2024-02-08 PROCEDURE — 2500000001 HC RX 250 WO HCPCS SELF ADMINISTERED DRUGS (ALT 637 FOR MEDICARE OP): Mod: SE | Performed by: ANESTHESIOLOGY

## 2024-02-08 PROCEDURE — 3700000001 HC GENERAL ANESTHESIA TIME - INITIAL BASE CHARGE: Performed by: ORTHOPAEDIC SURGERY

## 2024-02-08 PROCEDURE — 7100000009 HC PHASE TWO TIME - INITIAL BASE CHARGE: Performed by: ORTHOPAEDIC SURGERY

## 2024-02-08 PROCEDURE — 20680 REMOVAL OF IMPLANT DEEP: CPT | Performed by: ORTHOPAEDIC SURGERY

## 2024-02-08 PROCEDURE — 7100000001 HC RECOVERY ROOM TIME - INITIAL BASE CHARGE: Performed by: ORTHOPAEDIC SURGERY

## 2024-02-08 PROCEDURE — A20680 PR REMOVAL DEEP IMPLANT: Performed by: ANESTHESIOLOGY

## 2024-02-08 PROCEDURE — 3600000003 HC OR TIME - INITIAL BASE CHARGE - PROCEDURE LEVEL THREE: Performed by: ORTHOPAEDIC SURGERY

## 2024-02-08 PROCEDURE — 82947 ASSAY GLUCOSE BLOOD QUANT: CPT

## 2024-02-08 PROCEDURE — 3600000008 HC OR TIME - EACH INCREMENTAL 1 MINUTE - PROCEDURE LEVEL THREE: Performed by: ORTHOPAEDIC SURGERY

## 2024-02-08 PROCEDURE — 27860 FIXATION OF ANKLE JOINT: CPT | Performed by: ORTHOPAEDIC SURGERY

## 2024-02-08 PROCEDURE — 2500000004 HC RX 250 GENERAL PHARMACY W/ HCPCS (ALT 636 FOR OP/ED): Mod: SE | Performed by: ORTHOPAEDIC SURGERY

## 2024-02-08 PROCEDURE — 7100000002 HC RECOVERY ROOM TIME - EACH INCREMENTAL 1 MINUTE: Performed by: ORTHOPAEDIC SURGERY

## 2024-02-08 PROCEDURE — A4217 STERILE WATER/SALINE, 500 ML: HCPCS | Mod: SE | Performed by: ORTHOPAEDIC SURGERY

## 2024-02-08 PROCEDURE — 3700000002 HC GENERAL ANESTHESIA TIME - EACH INCREMENTAL 1 MINUTE: Performed by: ORTHOPAEDIC SURGERY

## 2024-02-08 PROCEDURE — 2720000007 HC OR 272 NO HCPCS: Performed by: ORTHOPAEDIC SURGERY

## 2024-02-08 PROCEDURE — A20680 PR REMOVAL DEEP IMPLANT: Performed by: ANESTHESIOLOGIST ASSISTANT

## 2024-02-08 PROCEDURE — 20680 REMOVAL OF IMPLANT DEEP: CPT

## 2024-02-08 RX ORDER — ASPIRIN 81 MG/1
81 TABLET ORAL 2 TIMES DAILY
Qty: 60 TABLET | Refills: 0 | Status: SHIPPED | OUTPATIENT
Start: 2024-02-08 | End: 2024-03-09

## 2024-02-08 RX ORDER — ROCURONIUM BROMIDE 10 MG/ML
INJECTION, SOLUTION INTRAVENOUS AS NEEDED
Status: DISCONTINUED | OUTPATIENT
Start: 2024-02-08 | End: 2024-02-08

## 2024-02-08 RX ORDER — LIDOCAINE HYDROCHLORIDE 20 MG/ML
INJECTION, SOLUTION INFILTRATION; PERINEURAL AS NEEDED
Status: DISCONTINUED | OUTPATIENT
Start: 2024-02-08 | End: 2024-02-08

## 2024-02-08 RX ORDER — VANCOMYCIN HYDROCHLORIDE 1 G/20ML
INJECTION, POWDER, LYOPHILIZED, FOR SOLUTION INTRAVENOUS AS NEEDED
Status: DISCONTINUED | OUTPATIENT
Start: 2024-02-08 | End: 2024-02-08 | Stop reason: HOSPADM

## 2024-02-08 RX ORDER — MIDAZOLAM HYDROCHLORIDE 1 MG/ML
INJECTION, SOLUTION INTRAMUSCULAR; INTRAVENOUS AS NEEDED
Status: DISCONTINUED | OUTPATIENT
Start: 2024-02-08 | End: 2024-02-08

## 2024-02-08 RX ORDER — TOBRAMYCIN 1.2 G/30ML
INJECTION, POWDER, LYOPHILIZED, FOR SOLUTION INTRAVENOUS AS NEEDED
Status: DISCONTINUED | OUTPATIENT
Start: 2024-02-08 | End: 2024-02-08 | Stop reason: HOSPADM

## 2024-02-08 RX ORDER — PROPOFOL 10 MG/ML
INJECTION, EMULSION INTRAVENOUS AS NEEDED
Status: DISCONTINUED | OUTPATIENT
Start: 2024-02-08 | End: 2024-02-08

## 2024-02-08 RX ORDER — OXYCODONE HYDROCHLORIDE 5 MG/1
5 TABLET ORAL EVERY 6 HOURS PRN
Qty: 28 TABLET | Refills: 0 | Status: SHIPPED | OUTPATIENT
Start: 2024-02-08 | End: 2024-02-15

## 2024-02-08 RX ORDER — DEXMEDETOMIDINE HYDROCHLORIDE 4 UG/ML
INJECTION, SOLUTION INTRAVENOUS CONTINUOUS PRN
Status: DISCONTINUED | OUTPATIENT
Start: 2024-02-08 | End: 2024-02-08

## 2024-02-08 RX ORDER — SODIUM CHLORIDE 0.9 G/100ML
IRRIGANT IRRIGATION AS NEEDED
Status: DISCONTINUED | OUTPATIENT
Start: 2024-02-08 | End: 2024-02-08 | Stop reason: HOSPADM

## 2024-02-08 RX ORDER — LIDOCAINE HYDROCHLORIDE 10 MG/ML
0.1 INJECTION INFILTRATION; PERINEURAL ONCE
Status: DISCONTINUED | OUTPATIENT
Start: 2024-02-08 | End: 2024-02-08 | Stop reason: HOSPADM

## 2024-02-08 RX ORDER — ONDANSETRON HYDROCHLORIDE 2 MG/ML
INJECTION, SOLUTION INTRAVENOUS AS NEEDED
Status: DISCONTINUED | OUTPATIENT
Start: 2024-02-08 | End: 2024-02-08

## 2024-02-08 RX ORDER — MIDAZOLAM HYDROCHLORIDE 1 MG/ML
INJECTION INTRAMUSCULAR; INTRAVENOUS AS NEEDED
Status: DISCONTINUED | OUTPATIENT
Start: 2024-02-08 | End: 2024-02-08

## 2024-02-08 RX ORDER — HYDROMORPHONE HYDROCHLORIDE 1 MG/ML
0.5 INJECTION, SOLUTION INTRAMUSCULAR; INTRAVENOUS; SUBCUTANEOUS EVERY 5 MIN PRN
Status: DISCONTINUED | OUTPATIENT
Start: 2024-02-08 | End: 2024-02-08 | Stop reason: HOSPADM

## 2024-02-08 RX ORDER — FENTANYL CITRATE 50 UG/ML
INJECTION, SOLUTION INTRAMUSCULAR; INTRAVENOUS AS NEEDED
Status: DISCONTINUED | OUTPATIENT
Start: 2024-02-08 | End: 2024-02-08

## 2024-02-08 RX ORDER — ONDANSETRON HYDROCHLORIDE 2 MG/ML
4 INJECTION, SOLUTION INTRAVENOUS ONCE AS NEEDED
Status: DISCONTINUED | OUTPATIENT
Start: 2024-02-08 | End: 2024-02-08 | Stop reason: HOSPADM

## 2024-02-08 RX ORDER — HYDROMORPHONE HYDROCHLORIDE 1 MG/ML
INJECTION, SOLUTION INTRAMUSCULAR; INTRAVENOUS; SUBCUTANEOUS AS NEEDED
Status: DISCONTINUED | OUTPATIENT
Start: 2024-02-08 | End: 2024-02-08

## 2024-02-08 RX ORDER — OXYCODONE HYDROCHLORIDE 5 MG/1
5 TABLET ORAL EVERY 4 HOURS PRN
Status: DISCONTINUED | OUTPATIENT
Start: 2024-02-08 | End: 2024-02-08 | Stop reason: HOSPADM

## 2024-02-08 RX ORDER — CEFAZOLIN 1 G/1
INJECTION, POWDER, FOR SOLUTION INTRAVENOUS AS NEEDED
Status: DISCONTINUED | OUTPATIENT
Start: 2024-02-08 | End: 2024-02-08

## 2024-02-08 RX ORDER — OXYCODONE HYDROCHLORIDE 5 MG/1
5 TABLET ORAL EVERY 6 HOURS PRN
Qty: 28 TABLET | Refills: 0 | Status: SHIPPED | OUTPATIENT
Start: 2024-02-08 | End: 2024-02-15 | Stop reason: SDUPTHER

## 2024-02-08 RX ORDER — HYDROMORPHONE HYDROCHLORIDE 1 MG/ML
0.2 INJECTION, SOLUTION INTRAMUSCULAR; INTRAVENOUS; SUBCUTANEOUS EVERY 5 MIN PRN
Status: DISCONTINUED | OUTPATIENT
Start: 2024-02-08 | End: 2024-02-08 | Stop reason: HOSPADM

## 2024-02-08 RX ORDER — LABETALOL HYDROCHLORIDE 5 MG/ML
INJECTION, SOLUTION INTRAVENOUS AS NEEDED
Status: DISCONTINUED | OUTPATIENT
Start: 2024-02-08 | End: 2024-02-08

## 2024-02-08 RX ORDER — DEXAMETHASONE SODIUM PHOSPHATE 4 MG/ML
INJECTION, SOLUTION INTRA-ARTICULAR; INTRALESIONAL; INTRAMUSCULAR; INTRAVENOUS; SOFT TISSUE AS NEEDED
Status: DISCONTINUED | OUTPATIENT
Start: 2024-02-08 | End: 2024-02-08

## 2024-02-08 RX ORDER — SODIUM CHLORIDE, SODIUM LACTATE, POTASSIUM CHLORIDE, CALCIUM CHLORIDE 600; 310; 30; 20 MG/100ML; MG/100ML; MG/100ML; MG/100ML
100 INJECTION, SOLUTION INTRAVENOUS CONTINUOUS
Status: DISCONTINUED | OUTPATIENT
Start: 2024-02-08 | End: 2024-02-08 | Stop reason: HOSPADM

## 2024-02-08 RX ORDER — TRANEXAMIC ACID 10 MG/ML
INJECTION, SOLUTION INTRAVENOUS AS NEEDED
Status: DISCONTINUED | OUTPATIENT
Start: 2024-02-08 | End: 2024-02-08

## 2024-02-08 RX ADMIN — PROPOFOL 10 MG: 10 INJECTION, EMULSION INTRAVENOUS at 09:39

## 2024-02-08 RX ADMIN — MIDAZOLAM HYDROCHLORIDE 2 MG: 1 INJECTION, SOLUTION INTRAMUSCULAR; INTRAVENOUS at 07:19

## 2024-02-08 RX ADMIN — OXYCODONE HYDROCHLORIDE 5 MG: 5 TABLET ORAL at 11:09

## 2024-02-08 RX ADMIN — DEXAMETHASONE SODIUM PHOSPHATE 4 MG: 4 INJECTION, SOLUTION INTRAMUSCULAR; INTRAVENOUS at 07:40

## 2024-02-08 RX ADMIN — SODIUM CHLORIDE, POTASSIUM CHLORIDE, SODIUM LACTATE AND CALCIUM CHLORIDE 100 ML/HR: 600; 310; 30; 20 INJECTION, SOLUTION INTRAVENOUS at 10:00

## 2024-02-08 RX ADMIN — SUGAMMADEX 200 MG: 100 INJECTION, SOLUTION INTRAVENOUS at 09:43

## 2024-02-08 RX ADMIN — HYDROMORPHONE HYDROCHLORIDE 0.2 MG: 1 INJECTION, SOLUTION INTRAMUSCULAR; INTRAVENOUS; SUBCUTANEOUS at 09:00

## 2024-02-08 RX ADMIN — LIDOCAINE HYDROCHLORIDE 70 MG: 20 INJECTION, SOLUTION INFILTRATION; PERINEURAL at 07:19

## 2024-02-08 RX ADMIN — CEFAZOLIN 2 G: 330 INJECTION, POWDER, FOR SOLUTION INTRAMUSCULAR; INTRAVENOUS at 07:43

## 2024-02-08 RX ADMIN — HYDROMORPHONE HYDROCHLORIDE 0.2 MG: 1 INJECTION, SOLUTION INTRAMUSCULAR; INTRAVENOUS; SUBCUTANEOUS at 08:35

## 2024-02-08 RX ADMIN — HYDROMORPHONE HYDROCHLORIDE 0.4 MG: 1 INJECTION, SOLUTION INTRAMUSCULAR; INTRAVENOUS; SUBCUTANEOUS at 09:57

## 2024-02-08 RX ADMIN — PROPOFOL 190 MG: 10 INJECTION, EMULSION INTRAVENOUS at 07:25

## 2024-02-08 RX ADMIN — HYDROMORPHONE HYDROCHLORIDE 0.5 MG: 1 INJECTION, SOLUTION INTRAMUSCULAR; INTRAVENOUS; SUBCUTANEOUS at 10:28

## 2024-02-08 RX ADMIN — HYDROMORPHONE HYDROCHLORIDE 0.5 MG: 1 INJECTION, SOLUTION INTRAMUSCULAR; INTRAVENOUS; SUBCUTANEOUS at 10:41

## 2024-02-08 RX ADMIN — LABETALOL HYDROCHLORIDE 10 MG: 5 INJECTION, SOLUTION INTRAVENOUS at 09:45

## 2024-02-08 RX ADMIN — HYDROMORPHONE HYDROCHLORIDE 0.2 MG: 1 INJECTION, SOLUTION INTRAMUSCULAR; INTRAVENOUS; SUBCUTANEOUS at 08:57

## 2024-02-08 RX ADMIN — LABETALOL HYDROCHLORIDE 5 MG: 5 INJECTION, SOLUTION INTRAVENOUS at 09:15

## 2024-02-08 RX ADMIN — DEXMEDETOMIDINE HYDROCHLORIDE 2.9 MCG/KG/HR: 4 INJECTION, SOLUTION INTRAVENOUS at 09:02

## 2024-02-08 RX ADMIN — ROCURONIUM BROMIDE 50 MG: 10 INJECTION, SOLUTION INTRAVENOUS at 07:25

## 2024-02-08 RX ADMIN — HYDROMORPHONE HYDROCHLORIDE 0.4 MG: 1 INJECTION, SOLUTION INTRAMUSCULAR; INTRAVENOUS; SUBCUTANEOUS at 08:15

## 2024-02-08 RX ADMIN — TRANEXAMIC ACID 1000 MG: 10 INJECTION, SOLUTION INTRAVENOUS at 07:43

## 2024-02-08 RX ADMIN — FENTANYL CITRATE 100 MCG: 50 INJECTION, SOLUTION INTRAMUSCULAR; INTRAVENOUS at 07:25

## 2024-02-08 RX ADMIN — HYDROMORPHONE HYDROCHLORIDE 0.5 MG: 1 INJECTION, SOLUTION INTRAMUSCULAR; INTRAVENOUS; SUBCUTANEOUS at 10:23

## 2024-02-08 RX ADMIN — ONDANSETRON 4 MG: 2 INJECTION, SOLUTION INTRAMUSCULAR; INTRAVENOUS at 09:09

## 2024-02-08 RX ADMIN — LABETALOL HYDROCHLORIDE 10 MG: 5 INJECTION, SOLUTION INTRAVENOUS at 07:39

## 2024-02-08 RX ADMIN — HYDROMORPHONE HYDROCHLORIDE 0.5 MG: 1 INJECTION, SOLUTION INTRAMUSCULAR; INTRAVENOUS; SUBCUTANEOUS at 10:07

## 2024-02-08 RX ADMIN — HYDROMORPHONE HYDROCHLORIDE 0.6 MG: 1 INJECTION, SOLUTION INTRAMUSCULAR; INTRAVENOUS; SUBCUTANEOUS at 09:54

## 2024-02-08 RX ADMIN — HYDROMORPHONE HYDROCHLORIDE 0.5 MG: 1 INJECTION, SOLUTION INTRAMUSCULAR; INTRAVENOUS; SUBCUTANEOUS at 10:35

## 2024-02-08 RX ADMIN — HYDROMORPHONE HYDROCHLORIDE 0.5 MG: 1 INJECTION, SOLUTION INTRAMUSCULAR; INTRAVENOUS; SUBCUTANEOUS at 10:13

## 2024-02-08 SDOH — HEALTH STABILITY: MENTAL HEALTH: CURRENT SMOKER: 0

## 2024-02-08 ASSESSMENT — PAIN - FUNCTIONAL ASSESSMENT
PAIN_FUNCTIONAL_ASSESSMENT: 0-10

## 2024-02-08 ASSESSMENT — COLUMBIA-SUICIDE SEVERITY RATING SCALE - C-SSRS
2. HAVE YOU ACTUALLY HAD ANY THOUGHTS OF KILLING YOURSELF?: NO
1. IN THE PAST MONTH, HAVE YOU WISHED YOU WERE DEAD OR WISHED YOU COULD GO TO SLEEP AND NOT WAKE UP?: NO
6. HAVE YOU EVER DONE ANYTHING, STARTED TO DO ANYTHING, OR PREPARED TO DO ANYTHING TO END YOUR LIFE?: NO

## 2024-02-08 ASSESSMENT — PAIN SCALES - GENERAL
PAINLEVEL_OUTOF10: 10 - WORST POSSIBLE PAIN
PAINLEVEL_OUTOF10: 0 - NO PAIN
PAINLEVEL_OUTOF10: 6
PAINLEVEL_OUTOF10: 6
PAINLEVEL_OUTOF10: 8
PAINLEVEL_OUTOF10: 6
PAINLEVEL_OUTOF10: 9
PAINLEVEL_OUTOF10: 10 - WORST POSSIBLE PAIN
PAINLEVEL_OUTOF10: 6
PAIN_LEVEL: 2

## 2024-02-08 NOTE — OP NOTE
ORTHOPAEDIC SURGERY OPERATIVE REPORT      Date of Surgery: 2/8/2024    Surgeon: Akil Collier MD    Assistants:  Ifeoma Liriano PA-C, she was my primary assistant for this procedure.  There were no available residents of a qualified level to assist with this procedure.  Her assistance was needed and critical to the success of this procedure.  She assisted with surgical exposure, placement of retractors, implantation of implant/prosthesis, and wound closure  Brent Matamoros MD    Preoperative Diagnosis:  Left ankle symptomatic hardware  Left ankle stiffness    Postoperative Diagnosis:  Left ankle symptomatic hardware  Left ankle stiffness    Procedures Performed:  Left ankle multiple hardware removal via separate incisions  Left ankle manipulation under anesthesia    Anesthesia: General anesthesia    IV Fluids: Per anesthesia record    Tourniquet Time: 88 minutes    Estimated Blood Loss:  15 mL    Complications: None    Implants: None    Explants:  Synthes 2.7/3.5 mm posterior fibula plate with associated cortical and locking screws  Synthes 2.7/3.5 mm posterior distal tibia plate with associated cortical and locking screws  Synthes 2.0/2.7 mm independent cortical screws    Specimens: None    Intraoperative Findings: Complete hardware removal performed without difficulty.  Fractures well-healed.  Improvement of ankle range of motion by about 5 degrees of plantarflexion and 15 degrees of dorsiflexion after manipulation.    Indications For Procedure:  Una Gallegos is a 58 y.o. female with a history of a left tibial plafond fracture with associated fibula fracture for which she had undergone initial external fixator application on 1/30/2022, followed by external fixator removal and definitive ORIF on 2/17/2022.  Since that time the patient's fractures have healed very well.  The patient does continue to complain of pain over her hardware as well as ankle stiffness.  As her fracture well-healed, we did offer  her intervention in the form of hardware removal, to which she was agreeable. Informed consent was obtained after discussing the risks, benefits, and alternatives to the procedure.  Risks include pain, bleeding, infection, damage to nearby structures, fracture, hardware complications, need for further procedures, blood clots, anesthesia risks, and death.  Decision was made to proceed.  The patient was then brought to the preoperative holding area on the day of surgery.    Procedure Detail:  The patient was met in the preoperative holding area where their identity was confirmed and the operative extremity was marked. The patient was taken back to the operating theater where a preinduction timeout was performed which confirmed the patient's identity, procedure to be performed, correct operative site, availability of imaging and implants, allergies, and preoperative antibiotics. Everyone present was in agreement. They were placed under general anesthesia without complication. The patient was transferred to the operating table and placed in the prone position. All bony prominences were well-padded. The patient's left lower extremity was then prepped and draped in the usual sterile fashion. A preprocedure timeout was performed which again confirmed the patient's identity, procedure to be performed, and correct operative site.  Everyone present was in agreement. Preoperative antibiotics were administered.    We began by first marking out the patient's prior posterior lateral ankle incision.  The limb was then exsanguinated with the use of an Esmarch bandage followed by inflation of the previously placed nonsterile tourniquet.  Incision was made through skin and subcutaneous tissue.  Deeper dissection was taken down with Bovie electrocautery along the scar plane until the fascia between the peroneals and FHL was identified.  This was incised and we were able to mobilize the FHL medially and peroneals laterally after  performing dissection with Bovie electrocautery and the use of a Landon elevator.  At this point, we were able to identify the posterior fibular plate.  We exposed this proximally and distally and removed all screws by hand without complication.  There were 2 independent lag screws just medial to the plate that were removed as well.  The plate was then removed.  We turned our attention to removal of the posterior tibial plate.  We mobilized the FHL and were able to visualize the posterior tibial plate.  We exposed this proximally distally with electrocautery and a Lanodn elevator.  All screws were removed by hand difficulty and the plate was removed.  There was an independent 2.7 mm lag screw underneath the plate distally which was removed as well.  We then turned attention to removal of the medial malleolus screws.  We utilized fluoroscopy to identify the location of both screws and then made a separate medial an approximately 2 cm longitudinal incision centered between these.  Incision was made through skin and subcutaneous tissue and deeper dissection was taken with electrocautery through the periosteal layer.  This was then elevated anteriorly and posteriorly to expose the screw heads.  The screws were removed without difficulty.  Fluoroscopy obtained at this time demonstrated complete removal of all hardware as well as the well-healed fractures.    At this time, we turned our attention to manipulation of the ankle.  Prior to manipulation, the patient's ankle range of motion with knee flexed was about 35 degrees of plantarflexion and dorsiflexion of maybe 2 to 3 degrees past neutral.  We then performed gentle dorsiflexion and plantarflexion maneuvers to attempt to break up the scar tissue.  Once we had completed this, the patient's knee range of motion was 40 degrees of plantarflexion and about 15 to 20 degrees of dorsiflexion.  The incisions were copiously irrigated with normal saline.  Vancomycin and tobramycin  powder was placed within the posterior lateral incision.  The deep fascial layer of the lateral incision was closed using a running 0 PDS suture.  The deep dermal layer of both incisions was closed with 2-0 Monocryl followed by 2-0 nylon in vertical mattress fashion for the skin.  All counts were correct x 2 at this time dressings were applied.    The drapes were taken down and the patient was awoken from anesthesia without complication. They were transferred back to their hospital bed and taken to PACU in stable condition.    Postoperative Plan:  The patient may be weightbearing as tolerated to the left lower extremity, though she is to avoid strenuous and high impact activities for the next several weeks to avoid any postoperative fracture.  We will plan for the patient to discharge home from the PACU once her pain is controlled.  Patient may start DVT prophylaxis in the form of aspirin 8 mg twice daily.  Patient will receive postoperative antibiotics.. The patient will follow up with Dr. Akil Collier MD in approximately 2 weeks time for clinical check, suture removal, and 3 view xrays of the left ankle (AP/lateral/mortise) at that time.      Dr. Akil Collier MD was present for the critical portions of the case and was otherwise immediately available to assist.      Brent Matamoros MD  Orthopaedic Trauma Fellow  2/8/2024

## 2024-02-08 NOTE — ANESTHESIA PROCEDURE NOTES
Peripheral IV  Date/Time: 2/8/2024 7:00 AM  Inserted by: Scott Sutton    Placement  Needle size: 22 G  Laterality: right  Location: wrist  Local anesthetic: injectable  Site prep: alcohol  Technique: anatomical landmarks  Attempts: 1

## 2024-02-08 NOTE — DISCHARGE INSTRUCTIONS
Weight Bearing Instructions:  You may be weight-bearing on your left leg at all times. You have no restrictions to range of motion.     Call your provider if:   Call if any drainage after 7 days, increased redness/warmth/swelling at incision site, pain/tenderness of calf, swelling of calf that does not respond to elevation, SOB/chest pain.    Dressing instructions:   Remove operative dressing from incision 7 days after surgery.  (May remove 1 day earlier or later depending on homecare nursing visit).  Wound may be open to air when dry. If there is continued drainage, cover with an abdominal pad and ACE wrap.  If the operative dressing was changed prior to 7 days post op, then remove the dressing 7 days from the time it was placed.    Medications:  You were given opioid pain medication for a short period. Please wean off of this as possible. You may take Tylenol as prescribed, avoid tylenol if you were given Norco or Percocet.     You were given Aspirin twice a day for DVT prophylaxis to prevent blood clots. Please take this medication as scheduled until complete.     Follow up:  Please call to schedule a follow-up appointment with your orthopedic surgeon in 2-3 weeks.

## 2024-02-08 NOTE — H&P
[facility] Department of Orthopaedic Surgery   Surgical History & Physical <30 Days    Reason for Surgery: L ankle symp hardware  Planned Procedure: L ankle JOSE    History & Physical Reviewed:  I have reviewed the Progress Note for obtained within the last 30 days on 1/16/24. Relevant findings and updates are noted below:  No significant changes.    Home medications were reviewed with significant updates noted below:  No significant changes.    ERAS patient?: No    COVID-19 Risk Consent:   Surgeon has reviewed the key risks related to isma COVID-19 and subsequent sequelae.     02/08/24 at 4:42 AM - Luann Sosa MD

## 2024-02-08 NOTE — ANESTHESIA PREPROCEDURE EVALUATION
Patient: Una Gallegos    Procedure Information       Anesthesia Start Date/Time: 02/08/24 0719    Procedure: Left Removal Hardware Lower Extremity Ankle (Left: Ankle)    Location: [facility] OR 09 / Virtual [facility]    Surgeons: Akil Collier MD            Relevant Problems   Cardiovascular   (+) Chest pain   (+) Chronic scapular pain   (+) Familial hypercholesteremia   (+) Familial hyperlipidemia      /Renal   (+) Acute cystitis   (+) UTI (urinary tract infection)      Neuro/Psych   (+) Acute left PCA stroke (CMS/HCC)   (+) Anxiety   (+) Bipolar depression (CMS/HCC)   (+) Cervical radiculopathy   (+) Depression   (+) Lumbar radiculitis   (+) Major depressive disorder, single episode, moderate (CMS/HCC)   (+) Multiple sclerosis (CMS/HCC)      Pulmonary   (+) Asthma   (+) CAP (community acquired pneumonia) (Resolved)      Musculoskeletal   (+) Cervical stenosis of spinal canal   (+) Lumbar disc disease      Infectious Disease   (+) 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   (+) Other herpes zoster eye disease   (+) Thrush, oral   (+) UTI (urinary tract infection)       Clinical information reviewed:   Tobacco  Allergies    Med Hx  Surg Hx   Fam Hx  Soc Hx        NPO Detail:  NPO/Void Status  Date of Last Liquid: 02/08/24  Time of Last Liquid: 0400  Date of Last Solid: 02/07/24  Time of Last Solid: 2000         Physical Exam    Airway  Mallampati: III  TM distance: >3 FB  Neck ROM: full     Cardiovascular   Rhythm: regular  Rate: normal     Dental    Pulmonary   Breath sounds clear to auscultation     Abdominal            Anesthesia Plan    History of general anesthesia?: yes  History of complications of general anesthesia?: no    ASA 3     general     The patient is not a current smoker.  Education provided regarding risk of obstructive sleep apnea.  intravenous induction   Postoperative administration of opioids is intended.  Anesthetic plan and risks discussed with patient.  Use of blood products  discussed with patient who.

## 2024-02-08 NOTE — ANESTHESIA PROCEDURE NOTES
Airway  Date/Time: 2/8/2024 7:29 AM  Urgency: elective    Airway not difficult    Staffing  Performed: KESHIA   Authorized by: Jesse Duggan MD    Performed by: MARINA Arnett  Patient location during procedure: OR    Indications and Patient Condition  Indications for airway management: anesthesia and airway protection  Spontaneous Ventilation: absent  Sedation level: deep  Preoxygenated: yes  Patient position: sniffing  MILS maintained throughout  Mask difficulty assessment: 1 - vent by mask  Planned trial extubation    Final Airway Details  Final airway type: endotracheal airway      Successful airway: ETT  Cuffed: yes   Successful intubation technique: direct laryngoscopy  Facilitating devices/methods: intubating stylet  Endotracheal tube insertion site: oral  Blade: Tamie  Blade size: #3  ETT size (mm): 7.0  Cormack-Lehane Classification: grade IIb - view of arytenoids or posterior of glottis only  Placement verified by: capnometry   Measured from: lips  Number of attempts at approach: 1  Ventilation between attempts: none  Number of other approaches attempted: 0

## 2024-02-08 NOTE — ANESTHESIA POSTPROCEDURE EVALUATION
Patient: Una Gallegos    Procedure Summary       Date: 02/08/24 Room / Location: [facility] OR 09 / Virtual [facility]    Anesthesia Start: 0719 Anesthesia Stop: 1004    Procedure: Left Removal Hardware Lower Extremity Ankle (Left: Ankle) Diagnosis:       Painful orthopaedic hardware (CMS/HCC)      Ankle stiff, left      (Painful orthopaedic hardware (CMS/HCC) [T84.84XA])      (Ankle stiff, left [M25.672])    Surgeons: Akil Collier MD Responsible Provider: Jesse Duggan MD    Anesthesia Type: general ASA Status: 3            Anesthesia Type: general    Vitals Value Taken Time   /67 02/08/24 0949   Temp 36.4 °C (97.5 °F) 02/08/24 0949   Pulse 88 02/08/24 1000   Resp 9 02/08/24 1000   SpO2 100 % 02/08/24 1000   Vitals shown include unvalidated device data.    Anesthesia Post Evaluation    Patient location during evaluation: PACU  Patient participation: complete - patient participated  Level of consciousness: awake and alert  Pain score: 2  Pain management: adequate  Airway patency: patent  Cardiovascular status: acceptable  Respiratory status: acceptable  Hydration status: acceptable  Postoperative Nausea and Vomiting: none        No notable events documented.

## 2024-02-11 ENCOUNTER — HOSPITAL ENCOUNTER (OUTPATIENT)
Dept: RADIOLOGY | Facility: HOSPITAL | Age: 59
Discharge: HOME | End: 2024-02-11
Payer: COMMERCIAL

## 2024-02-11 ENCOUNTER — HOSPITAL ENCOUNTER (EMERGENCY)
Facility: HOSPITAL | Age: 59
Discharge: HOME | End: 2024-02-11
Attending: EMERGENCY MEDICINE
Payer: COMMERCIAL

## 2024-02-11 VITALS
OXYGEN SATURATION: 96 % | RESPIRATION RATE: 17 BRPM | DIASTOLIC BLOOD PRESSURE: 70 MMHG | HEART RATE: 87 BPM | TEMPERATURE: 98.7 F | SYSTOLIC BLOOD PRESSURE: 112 MMHG

## 2024-02-11 DIAGNOSIS — G95.9 CERVICAL MYELOPATHY (MULTI): ICD-10-CM

## 2024-02-11 DIAGNOSIS — Z51.89 VISIT FOR WOUND CHECK: Primary | ICD-10-CM

## 2024-02-11 PROCEDURE — 72141 MRI NECK SPINE W/O DYE: CPT

## 2024-02-11 PROCEDURE — 99284 EMERGENCY DEPT VISIT MOD MDM: CPT | Performed by: EMERGENCY MEDICINE

## 2024-02-11 PROCEDURE — 72146 MRI CHEST SPINE W/O DYE: CPT

## 2024-02-11 PROCEDURE — 99283 EMERGENCY DEPT VISIT LOW MDM: CPT | Performed by: EMERGENCY MEDICINE

## 2024-02-11 PROCEDURE — 2500000005 HC RX 250 GENERAL PHARMACY W/O HCPCS: Mod: SE | Performed by: EMERGENCY MEDICINE

## 2024-02-11 PROCEDURE — 2500000001 HC RX 250 WO HCPCS SELF ADMINISTERED DRUGS (ALT 637 FOR MEDICARE OP): Mod: SE | Performed by: EMERGENCY MEDICINE

## 2024-02-11 RX ORDER — ONDANSETRON 4 MG/1
4 TABLET, ORALLY DISINTEGRATING ORAL ONCE
Status: COMPLETED | OUTPATIENT
Start: 2024-02-11 | End: 2024-02-11

## 2024-02-11 RX ORDER — OXYCODONE HYDROCHLORIDE 5 MG/1
5 TABLET ORAL ONCE
Status: COMPLETED | OUTPATIENT
Start: 2024-02-11 | End: 2024-02-11

## 2024-02-11 RX ADMIN — OXYCODONE HYDROCHLORIDE 5 MG: 5 TABLET ORAL at 11:43

## 2024-02-11 RX ADMIN — ONDANSETRON 4 MG: 4 TABLET, ORALLY DISINTEGRATING ORAL at 11:43

## 2024-02-11 ASSESSMENT — LIFESTYLE VARIABLES
HAVE PEOPLE ANNOYED YOU BY CRITICIZING YOUR DRINKING: NO
EVER FELT BAD OR GUILTY ABOUT YOUR DRINKING: NO
EVER HAD A DRINK FIRST THING IN THE MORNING TO STEADY YOUR NERVES TO GET RID OF A HANGOVER: NO
HAVE YOU EVER FELT YOU SHOULD CUT DOWN ON YOUR DRINKING: NO

## 2024-02-11 ASSESSMENT — PAIN - FUNCTIONAL ASSESSMENT: PAIN_FUNCTIONAL_ASSESSMENT: 0-10

## 2024-02-11 ASSESSMENT — PAIN SCALES - GENERAL: PAINLEVEL_OUTOF10: 7

## 2024-02-11 NOTE — ED TRIAGE NOTES
Pt had hardware removed from L ankle Thursday and dressing placed. Pt was told not to change dressing for 7 days. Pt dressing is completely saturated and pt endorsing pain. Pt concerned for infection. Follow up appointment in 2 weeks.

## 2024-02-11 NOTE — DISCHARGE INSTRUCTIONS
Please follow-up with orthopedic surgery as scheduled.  Return for any worsening pain, any numbness or weakness in the left leg, or any other concerning symptoms.

## 2024-02-11 NOTE — ED PROVIDER NOTES
HPI   Chief Complaint   Patient presents with    Wound Check       HPI  This is a 58-year-old female who is  status post Ex-Fix removal last .  She was told to keep the dressing on for approximately a week.  She noted blood underneath the dressing.  Notes pain but states her last dose of oxycodone was last night.  Denies any fevers or chills.  She is nonweightbearing as instructed.  Denies any numbness or weakness in the left lower extremity.  Not on any anticoagulants.  She regularly takes baby aspirin but she has not restarted this.                     No data recorded                   Patient History   Past Medical History:   Diagnosis Date    Asthma     Chronic headaches     Migraines    COVID-19     COVID + 2023    Depression     Diabetes mellitus (CMS/[facility])     10/23/23 A1C 6.0    Encounter for electrocardiogram 2024    EKG on 24    Fibromyalgia, primary     Fractures     Painful orthopaedic Closed fracture of left ankle with routine healing,    Gastritis     History of blood transfusion     Several years ago    Hyperlipidemia     Hypertension     Joint pain     Lupus (CMS/[facility])     MS (multiple sclerosis) (CMS/[facility])     F/W Dr. Silvia Molina LOV 2023    Painful orthopaedic hardware (CMS/[facility])     Ortho: Akil Collier    Personal history of other diseases of the musculoskeletal system and connective tissue     Personal history of fibromyalgia    Seizure disorder (CMS/[facility])     Last occurence in     Stroke (CMS/[facility])     CVA in      Past Surgical History:   Procedure Laterality Date    APPENDECTOMY  2014    Appendectomy    CERVICAL FUSION       SECTION, CLASSIC       Section x2    COLONOSCOPY      CT ANGIO NECK  12/10/2019    CT NECK ANGIO W AND WO IV CONTRAST 12/10/2019 [facility] [place] [facility]    CT ANGIO NECK  2022    CT NECK ANGIO W AND WO IV CONTRAST 2022 [facility] [place] [facility]    CT HEAD ANGIO W AND WO IV CONTRAST  12/10/2019    CT HEAD  ANGIO W AND WO IV CONTRAST [place] [facility] [place]    CT HEAD ANGIO W AND WO IV CONTRAST  01/13/2022    CT HEAD ANGIO W AND WO IV CONTRAST 1[place]13/2022 [facility] [place]    HYSTERECTOMY  10/05/2015    Hysterectomy    MR HEAD ANGIO WO IV CONTRAST  08/26/2020    MR HEAD ANGIO WO IV CONTRAST 8/26/2020 [facility] [place] [facility]    MR HEAD ANGIO WO IV CONTRAST  09/21/2021    MR HEAD ANGIO WO IV CONTRAST 9/21/2021 [facility] [place] [facility]    MR NECK ANGIO WO IV CONTRAST  08/26/2020    MR NECK ANGIO WO IV CONTRAST 8/26/2020 [facility] [place] [facility]    MR NECK ANGIO WO IV CONTRAST  09/21/2021    MR NECK ANGIO WO IV CONTRAST 9/21/2021 [facility] [place] [facility]    ORIF TIBIA FRACTURE       Family History   Problem Relation Name Age of Onset    Hypertension Mother      Cancer Father      Eating disorder Sister      Other (hoarding behavior) Sister      Lupus Mother's Sister          Systemic    Rheum arthritis Other Aunt      Social History     Tobacco Use    Smoking status: Every Day     Types: Cigarettes    Smokeless tobacco: Never    Tobacco comments:     2 cigarettes a day   Vaping Use    Vaping Use: Never used   Substance Use Topics    Alcohol use: Never    Drug use: Never       Physical Exam   ED Triage Vitals [02/11/24 1052]   Temperature Heart Rate Respirations BP   37.1 °C (98.7 °F) 89 18 118/71      Pulse Ox Temp Source Heart Rate Source Patient Position   95 % Temporal -- --      BP Location FiO2 (%)     -- --       Physical Exam  Vitals and nursing note reviewed.   Constitutional:       Appearance: Normal appearance.   Cardiovascular:      Rate and Rhythm: Normal rate.      Pulses: Normal pulses.      Comments: 2+ left DP and PT pulses  Musculoskeletal:      Comments: Able to dorsiflex and plantarflex left ankle with minimal pain. Compartments in LLE are soft    Skin:     General: Skin is warm and dry.      Capillary Refill: Capillary refill takes less than 2 seconds.      Comments: Mild edema to left lower extremity.  12 cm  vertical incision noted with dressing over this (see media).  Dried blood noted.  No surrounding erythema, fluctuance or induration around the dressing.  No purulent drainage.    Neurological:      General: No focal deficit present.      Mental Status: She is alert and oriented to person, place, and time. Mental status is at baseline.       ED Course & MDM   Diagnoses as of 02/11/24 122   Visit for wound check       Medical Decision Making  Una Gallegos is a 58 y.o. female with a history of a left tibial plafond fracture with associated fibula fracture for which she had undergone initial external fixator application on 1/30/2022, followed by external fixator removal and definitive ORIF on 2/17/2022.    Patient states Ex-Fix was removed on Thursday, February 8.  Dressing was placed at that time.  Patient took down Ace wrap and noted dried blood under the dressing.  Pictures in her chart. She became concerned.  States her last Oxycodone dose was last night and notes persistent pain.  Paged orthopedic surgery - they evaluated the patient.  They removed the dressing.  Incision is clean dry intact with sutures.  Mild surrounding edema, no purulent drainage.  Mild ecchymosis but this is to be expected.  Strong distal left DP and DP pulses.  Compartments are soft. Patient given dose of oxycodone here.  Orthopedic surgery resident staffed with their attending and states patient is okay for discharge home.  Has outpatient follow-up scheduled.  She was agreeable to discharge plan.         Darrion Strange MD MPH  02/11/24 7921

## 2024-02-11 NOTE — CONSULTS
Orthopaedic Surgery Consult Note    Subjective:    Injury: surgical dressing saturated  HPI: 58F s/p left ankle JOSE 2/8/24 w Dr Collier returns to ED for saturation of left lateral ankle saturated mepilex. No acute events or trauma since discharge. NVI. No active draining. No surgical incision dehiscence.    Orthopaedic Problems/Injuries: as above  Other Injuries: none    PMH: per above/EMR  PSH: per above/EMR  SocHx:      - Lives at home  FamHx:  Non-contributory to this patient's acute orthopaedic problem other than as mentioned in HPI  All: Reviewed in EMR  Meds: Reviewed in EMR    Objective:  · Physical Exam:  - Constitutional: No acute distress, cooperative  - Eyes: EOM grossly intact  - Head/Neck: Trachea midline  - Respiratory/Thorax: Normal work of breathing  - Cardiovascular: RRR on peripheral palpation  - Gastrointestinal: Nondistended  - Psychological: Appropriate mood/behavior  - Skin: Warm and dry. Additional findings in musculoskeletal evaluation  - Musculoskeletal:  Left Lower Extremity:   -Mepilex dressing saturated, removed  -Surgical incision intact, sutures in place  -No incisional erythema or active drainage  -Fires DF/PF/EHL/FHL  -SILT in saph/sural/SPN/DPN distributions  -Foot warm, well perfused  -Palpable DP pulse, brisk cap refill  -Compartments soft and compressible      ROS      - 14 point ROS negative except as above    No results found for this or any previous visit (from the past 24 hour(s)).    No orders to display       Assessment/Plan:    Injury: surgical dressing saturated  HPI: 58F s/p left ankle JOSE 2/8/24 w Dr Collier returns to ED for saturation of left lateral ankle saturated mepilex. No acute events or trauma since discharge. NVI. No active draining. No surgical incision dehiscence. Mepilex replaced.     Recommendations:  - No acute orthopaedic interventions  - Mepilex replaced  - Weight bearing status: WBAT LLE (avoid strenuous activities over next few weeks)  -  Antibiotics: per primary, none indicated from ortho perspective  - Analgesia per ED/primary  - Resume prior post-operative recommendations  - F/U with Dr. Collier as scheduled after discharged. Call [phone] to schedule appointment.  - Please don't hesitate to page with questions.    D/w Dr. Collier    This consult was seen and evaluated within 30 minutes.    Jake Amaya MD  Orthopaedic Surgery PGY-1  Resident On-Call  Pager: [phone]  Available via Epic Chat

## 2024-02-12 ENCOUNTER — SPECIALTY PHARMACY (OUTPATIENT)
Dept: PHARMACY | Facility: CLINIC | Age: 59
End: 2024-02-12

## 2024-02-14 DIAGNOSIS — G35 MULTIPLE SCLEROSIS (MULTI): ICD-10-CM

## 2024-02-15 ENCOUNTER — SPECIALTY PHARMACY (OUTPATIENT)
Dept: PHARMACY | Facility: CLINIC | Age: 59
End: 2024-02-15

## 2024-02-15 DIAGNOSIS — M25.672 ANKLE STIFF, LEFT: ICD-10-CM

## 2024-02-15 DIAGNOSIS — T84.84XA PAINFUL ORTHOPAEDIC HARDWARE (CMS-HCC): ICD-10-CM

## 2024-02-15 RX ORDER — GABAPENTIN 400 MG/1
800 CAPSULE ORAL 3 TIMES DAILY
Qty: 180 CAPSULE | Refills: 0 | Status: SHIPPED | OUTPATIENT
Start: 2024-02-15 | End: 2024-03-26

## 2024-02-15 RX ORDER — OXYCODONE HYDROCHLORIDE 5 MG/1
5 TABLET ORAL EVERY 6 HOURS PRN
Qty: 28 TABLET | Refills: 0 | Status: SHIPPED | OUTPATIENT
Start: 2024-02-16 | End: 2024-02-23 | Stop reason: SDUPTHER

## 2024-02-15 NOTE — TELEPHONE ENCOUNTER
Pt called for pain medicine. DOS 2/8/2024. Rates pain a 7/10. I have personally reviewed the OARRS report for the patient, no issues identified. This report is scanned into the EMR. I have considered the risks of abuse, dependence, addiction, and diversion. The 7 day Rx sent to pharmacy on file. RAI BOOTHE

## 2024-02-16 ENCOUNTER — PHARMACY VISIT (OUTPATIENT)
Dept: PHARMACY | Facility: CLINIC | Age: 59
End: 2024-02-16
Payer: MEDICAID

## 2024-02-23 DIAGNOSIS — M25.672 ANKLE STIFF, LEFT: ICD-10-CM

## 2024-02-23 DIAGNOSIS — T84.84XA PAINFUL ORTHOPAEDIC HARDWARE (CMS-HCC): ICD-10-CM

## 2024-02-23 RX ORDER — OXYCODONE HYDROCHLORIDE 5 MG/1
5 TABLET ORAL EVERY 6 HOURS PRN
Qty: 28 TABLET | Refills: 0 | Status: SHIPPED | OUTPATIENT
Start: 2024-02-23 | End: 2024-03-01

## 2024-02-23 NOTE — TELEPHONE ENCOUNTER
Patient called  requesting refill on Oxycodone. Last refill was 2/15/2024. DOS 2/8/2024 with Dr. Collier.

## 2024-02-27 ENCOUNTER — HOSPITAL ENCOUNTER (OUTPATIENT)
Dept: RADIOLOGY | Facility: HOSPITAL | Age: 59
Discharge: HOME | End: 2024-02-27
Payer: COMMERCIAL

## 2024-02-27 ENCOUNTER — OFFICE VISIT (OUTPATIENT)
Dept: ORTHOPEDIC SURGERY | Facility: HOSPITAL | Age: 59
End: 2024-02-27
Payer: COMMERCIAL

## 2024-02-27 VITALS — WEIGHT: 193 LBS | HEIGHT: 67 IN | BODY MASS INDEX: 30.29 KG/M2

## 2024-02-27 DIAGNOSIS — S82.873D: ICD-10-CM

## 2024-02-27 DIAGNOSIS — S82.839D: ICD-10-CM

## 2024-02-27 PROCEDURE — 73610 X-RAY EXAM OF ANKLE: CPT | Mod: LT

## 2024-02-27 PROCEDURE — 3008F BODY MASS INDEX DOCD: CPT

## 2024-02-27 PROCEDURE — 73610 X-RAY EXAM OF ANKLE: CPT | Mod: LEFT SIDE | Performed by: RADIOLOGY

## 2024-02-27 PROCEDURE — 4004F PT TOBACCO SCREEN RCVD TLK: CPT

## 2024-02-27 PROCEDURE — 99024 POSTOP FOLLOW-UP VISIT: CPT

## 2024-02-27 ASSESSMENT — PAIN SCALES - GENERAL: PAINLEVEL_OUTOF10: 7

## 2024-02-27 ASSESSMENT — PAIN - FUNCTIONAL ASSESSMENT: PAIN_FUNCTIONAL_ASSESSMENT: 0-10

## 2024-02-28 NOTE — PROGRESS NOTES
Subjective    Patient ID: Una Gallegos is a 58 y.o. female.    Chief Complaint: Follow-up of the Left Ankle     Last Surgery: Left ankle removal of hardware  Last Surgery Date: 2/8/2024    HPI  Patient is a 58 y.o. female who is 2 weeks s/p left ankle removal of hardware. Date of surgery was 2/8/2024. Patient continues to be weight bearing at this time. Patient went to the ER due to excessive bloody drainage. Her bandage was changed and she has not had drainage since then. No tenderness, swelling, or malodorous drainage coming from the incision. Patient continues on ASA for DVT ppx. Patient denies fever or chills, N/T or calf pain.     ROS: All other systems have been reviewed and are negative except as previously noted in history of present illness.      IMP:  Problem List Items Addressed This Visit       Closed fracture of tibial plafond with fibula involvement with routine healing    Relevant Orders    XR ankle left 3+ views (Completed)     Objective   General: Alert and oriented x 3, NAD, respirations easy and unlabored with no audible wheezes, skin warm and dry, speech and dress appropriate for noted age, affect euthymic.     Musculoskeletal: Left Ankle  incisions c/d/i  mild swelling at bimalleolar ankle  compartments soft  no calf tenderness  sensation intact to light touch  motor intact including TA/GS/EHL  palpable DP/PT pulses 2+     X-ray: Images of left ankle reviewed personally by me today and reveals interval change of removed tibia and fibula hardware. Ankle mortise is intact.     Assessment/Plan   Encounter Diagnoses:  Closed fracture of tibial plafond with fibula involvement with routine healing, unspecified laterality, subsequent encounter    PLAN: Patient is 2 weeks s/p left ankle removal of hardware. Sutures were removed at this visit. Patient was seen at the ER for excessive bloody drainage. Her bandage was changed and she has not had drainage since. There is no redness, tenderness, or  swelling at the incision. Patient is weight bearing and performing ankle ROM. Imaging shows interval change of removed tibia and fibula hardware and ankle mortise intact. Patient may return to activities of daily living as tolerated, but should not put high stress on the ankle due to the risk of a rebreak at the site of hardware removal. Patient will follow up in 2 months. Patient is in agreement with this plan. Xrays of left ankle needed.     Orders Placed This Encounter    XR ankle left 3+ views     No follow-ups on file.

## 2024-03-08 ENCOUNTER — APPOINTMENT (OUTPATIENT)
Dept: PAIN MEDICINE | Facility: CLINIC | Age: 59
End: 2024-03-08
Payer: COMMERCIAL

## 2024-03-11 ENCOUNTER — SPECIALTY PHARMACY (OUTPATIENT)
Dept: PHARMACY | Facility: CLINIC | Age: 59
End: 2024-03-11

## 2024-03-11 PROCEDURE — RXMED WILLOW AMBULATORY MEDICATION CHARGE

## 2024-03-15 ENCOUNTER — PHARMACY VISIT (OUTPATIENT)
Dept: PHARMACY | Facility: CLINIC | Age: 59
End: 2024-03-15
Payer: MEDICAID

## 2024-03-15 ENCOUNTER — SPECIALTY PHARMACY (OUTPATIENT)
Dept: PHARMACY | Facility: CLINIC | Age: 59
End: 2024-03-15

## 2024-03-25 DIAGNOSIS — G35 MULTIPLE SCLEROSIS (MULTI): ICD-10-CM

## 2024-03-26 RX ORDER — GABAPENTIN 400 MG/1
800 CAPSULE ORAL 3 TIMES DAILY
Qty: 180 CAPSULE | Refills: 0 | Status: SHIPPED | OUTPATIENT
Start: 2024-03-26 | End: 2024-05-23

## 2024-03-27 ENCOUNTER — OFFICE VISIT (OUTPATIENT)
Dept: NEUROSURGERY | Facility: HOSPITAL | Age: 59
End: 2024-03-27
Payer: COMMERCIAL

## 2024-03-27 VITALS
HEART RATE: 82 BPM | WEIGHT: 193 LBS | RESPIRATION RATE: 18 BRPM | HEIGHT: 67 IN | SYSTOLIC BLOOD PRESSURE: 136 MMHG | BODY MASS INDEX: 30.29 KG/M2 | DIASTOLIC BLOOD PRESSURE: 80 MMHG

## 2024-03-27 DIAGNOSIS — G95.9 CERVICAL MYELOPATHY (MULTI): Primary | ICD-10-CM

## 2024-03-27 PROCEDURE — 3008F BODY MASS INDEX DOCD: CPT | Performed by: NEUROLOGICAL SURGERY

## 2024-03-27 PROCEDURE — 99212 OFFICE O/P EST SF 10 MIN: CPT | Performed by: NEUROLOGICAL SURGERY

## 2024-03-27 PROCEDURE — 4004F PT TOBACCO SCREEN RCVD TLK: CPT | Performed by: NEUROLOGICAL SURGERY

## 2024-03-27 ASSESSMENT — PAIN SCALES - GENERAL: PAINLEVEL: 8

## 2024-03-27 NOTE — PROGRESS NOTES
2- C4-6 Fusion.  Today is here after having MRI and having burning pain in the back and shoulder with tingling.    58-year-old woman returns for follow-up for her back and arm pain.  She denies any changes in the symptoms.    On exam, she is alert and interactive.  Extraocular movements are intact.  Face moves symmetrically.  She continues to have 4 out of 5 strength.    MRIs of her cervical and thoracic spine demonstrates postsurgical changes from her C4-6 decompression and fusion.  There is no evidence of cord compression.  At the C6-7 level, there are bilateral osteophytes resulting in foraminal stenosis with nerve root compression.    The patient has bilateral cervical radiculopathy with foraminal stenosis with nerve root compression.  To address this, I have offered her a C6-7 anterior cervical discectomy and fusion with donor bone and plating.  We reviewed the risks and benefits of this today.  Specifically, we discussed that there is some uncertainty about whether or not the foraminal stenosis is causing her arm symptoms given her prior history of multiple sclerosis.  I do not think that it is the cause of her mid back pain.  As such, surgery would be unlikely to affect this.  She will contact my office when she makes a final decision about surgery.

## 2024-04-08 ENCOUNTER — SPECIALTY PHARMACY (OUTPATIENT)
Dept: PHARMACY | Facility: CLINIC | Age: 59
End: 2024-04-08

## 2024-04-08 PROCEDURE — RXMED WILLOW AMBULATORY MEDICATION CHARGE

## 2024-04-09 ENCOUNTER — SPECIALTY PHARMACY (OUTPATIENT)
Dept: PHARMACY | Facility: CLINIC | Age: 59
End: 2024-04-09

## 2024-04-10 ENCOUNTER — PHARMACY VISIT (OUTPATIENT)
Dept: PHARMACY | Facility: CLINIC | Age: 59
End: 2024-04-10
Payer: MEDICAID

## 2024-04-12 ENCOUNTER — OFFICE VISIT (OUTPATIENT)
Dept: PAIN MEDICINE | Facility: CLINIC | Age: 59
End: 2024-04-12
Payer: COMMERCIAL

## 2024-04-12 VITALS
WEIGHT: 192 LBS | HEART RATE: 93 BPM | SYSTOLIC BLOOD PRESSURE: 150 MMHG | DIASTOLIC BLOOD PRESSURE: 81 MMHG | RESPIRATION RATE: 18 BRPM | BODY MASS INDEX: 30.13 KG/M2 | HEIGHT: 67 IN

## 2024-04-12 DIAGNOSIS — M54.16 LUMBAR RADICULITIS: ICD-10-CM

## 2024-04-12 PROCEDURE — 4004F PT TOBACCO SCREEN RCVD TLK: CPT | Performed by: PAIN MEDICINE

## 2024-04-12 PROCEDURE — 3008F BODY MASS INDEX DOCD: CPT | Performed by: PAIN MEDICINE

## 2024-04-12 PROCEDURE — 99213 OFFICE O/P EST LOW 20 MIN: CPT | Performed by: PAIN MEDICINE

## 2024-04-12 ASSESSMENT — PAIN DESCRIPTION - DESCRIPTORS: DESCRIPTORS: ACHING;BURNING;SHARP;SHOOTING;RADIATING

## 2024-04-12 ASSESSMENT — PAIN - FUNCTIONAL ASSESSMENT: PAIN_FUNCTIONAL_ASSESSMENT: 0-10

## 2024-04-12 ASSESSMENT — PAIN SCALES - GENERAL
PAINLEVEL: 9
PAINLEVEL_OUTOF10: 9

## 2024-04-12 NOTE — PROGRESS NOTES
Pain Management Clinic Note     Chief Complaint: Back Pain      History Of Present Illness  Una Gallegos is a 58 y.o. female with a past medical history of fibromyalgia, diabetes, hypertension, hyperlipidemia, multiple sclerosis, seizure disorder, and cervical disc disorder with chronic neck pain who is presenting for follow-up evaluation of neck pain.  Patient was last seen in December 2023 where she received a C7-T1 epidural steroid injection, she also has history of C4-C6 laminectomy.   Today the patient is coming in with 2 weeks of right-sided low back pain with radiation down the right leg all the way to the ankle.  Patient did not have any inciting event that triggered the pain began insidiously around 2 weeks ago.  The pain is a piercing like pain that starts on the right side of her low back and goes down the posterior thigh to the lateral ankle.  She has no pain on the left side.  The pain is worse with activity and prolonged sitting, improves with rest and stretching.  She also reports some muscle spasms in her mid back that cause her significant pain and happen almost daily.  Patient was seen for this mid back pain recently where she had a thoracic and cervical MRIs completed with the impressions seen below.    The patient reports no associated weakness in the legs but does have some numbness in the right lower extremity, she also has noted 2 episodes of incontinence in the past 2 weeks which is new for her.  She states she had dreams of going to the bathroom and was incontinent in her sleep twice.  The patient is currently taking gabapentin, baclofen, Flexeril, and Topamax.  She believes the medications give her some mild relief.    Most recent imaging 02/11/2024  MRI Cervical and Thoracic Spine   IMPRESSION:  1. Changes of C4-6 laminectomy without residual spinal canal  narrowing, no abnormal cord signal.  2. Cervical spine degenerative changes, mildly progressed from 2019.  3. Thoracic spine  degenerative changes without significant spinal  canal narrowing.    Previous treatments include:   Medications: Gabapentin 800mg TID, Topamax 100mg BID,    Physical Therapy: > 6months ago   Injections: C7-T1 ALENA   Surgery: C3-C6 Laminectomy      The pain causes significant stress in the patient's life, specifically interferes with general activity, mood, walking ability, ability to perform tasks at home and/or work.  Patient participates in physical therapy and continues to perform physician directed exercises at home. Denies any bowel or bladder incontinence, saddle anesthesia, worsening pain, weakness or falls.     Past Medical History  She has a past medical history of Asthma ([facility]), Chronic headaches, COVID-19, Depression, Diabetes mellitus (Multi), Encounter for electrocardiogram (2024), Fibromyalgia, primary, Fractures, Gastritis, History of blood transfusion, Hyperlipidemia, Hypertension, Joint pain, Lupus (Multi), MS (multiple sclerosis) (Multi), Painful orthopaedic hardware (CMS-HCC), Personal history of other diseases of the musculoskeletal system and connective tissue, Seizure disorder (Multi), and Stroke (Multi).    Surgical History  She has a past surgical history that includes  section, classic; Appendectomy (2014); Hysterectomy (10/05/2015); CT angio head w and wo IV contrast (12/10/2019); CT angio neck (12/10/2019); MR angio head wo IV contrast (2020); MR angio neck wo IV contrast (2020); MR angio head wo IV contrast (2021); MR angio neck wo IV contrast (2021); CT angio head w and wo IV contrast (2022); CT angio neck (2022); Colonoscopy; ORIF tibia fracture; and Cervical fusion.     Social History  She reports that she has quit smoking. Her smoking use included cigarettes. She has never used smokeless tobacco. She reports that she does not drink alcohol and does not use drugs.    Family History  Family History   Problem Relation Name Age of  Onset    Hypertension Mother      Cancer Father      Eating disorder Sister      Other (hoarding behavior) Sister      Lupus Mother's Sister          Systemic    Rheum arthritis Other Aunt         Allergies  Patient has no known allergies.    Review of Symptoms:   Constitutional: Negative for chills, diaphoresis or fever  HENT: Negative for neck swelling  Eyes:.  Negative for eye pain  Respiratory:.  Negative for cough, shortness of breath or wheezing    Cardiovascular:.  Negative for chest pain or palpitations  Gastrointestinal:.  Negative for abdominal pain, nausea and vomiting  Genitourinary:.  Negative for urgency  Musculoskeletal:  Positive for back pain. Positive for joint pain. Denies falls within the past 3 months.  Skin: Negative for wounds or itching   Neurological: Negative for dizziness, seizures, loss of consciousness and weakness  Endo/Heme/Allergies: Does not bruise/bleed easily  Psychiatric/Behavioral: Negative for depression. The patient does not appear anxious.       Physical Exam  Constitutional:       General: She is not in acute distress.     Appearance: Normal appearance.   HENT:      Head: Normocephalic.   Eyes:      Extraocular Movements: Extraocular movements intact.      Conjunctiva/sclera: Conjunctivae normal.      Pupils: Pupils are equal, round, and reactive to light.   Cardiovascular:      Rate and Rhythm: Normal rate and regular rhythm.   Pulmonary:      Effort: Pulmonary effort is normal. No respiratory distress.   Musculoskeletal:         General: Tenderness present. Normal range of motion.      Cervical back: Normal range of motion.   Skin:     General: Skin is warm and dry.   Neurological:      General: No focal deficit present.      Mental Status: She is alert and oriented to person, place, and time.   Psychiatric:         Mood and Affect: Mood normal.        Advanced Exam   Inspection: No gross deformities, no surgical scars  Palpation: No tenderness of patient of cervical  midline, cervical paraspinals  ROM: Normal range of motion of the cervical flexion, extension and rotation  Motor: 5/5 strength upper and lower extremities  Sensory: Negative for sensory abnormalities in upper and lower extremities  Reflexes: 2+ reflexes bilateral upper and lower extremities, negative Duarte sign  Lumbar: SLR Positive on Right, Facet loading negative  Sacral: Fabers Negative  Hip: Internal and external rotation of the hips is negative       Last Recorded Vitals  There were no vitals taken for this visit.    Relevant Results  Current Outpatient Medications   Medication Instructions    acetaminophen (TYLENOL) 500 mg, oral, Every 6 hours PRN    albuterol 90 mcg/actuation inhaler inhalation    ascorbic acid (VITAMIN C) 250 mg, oral, Daily    atorvastatin (LIPITOR) 80 mg, oral, Nightly    azelastine (Astelin) 137 mcg (0.1 %) nasal spray 2 sprays, nasal, 2 times daily    dicyclomine (BENTYL) 20 mg, oral, 4 times daily PRN    dimethyl fumarate (Tecfidera) 240 mg capsule,delayed release(DR/EC) capsule TAKE ONE (1) CAPSULE BY MOUTH TWICE DAILY. SWALLOW WHOLE. DO NOT OPEN CAPSULE. DO NOT CRUSH OR CHEW.    docusate sodium (COLACE) 100 mg, oral, 2 times daily    escitalopram (LEXAPRO) 10 mg, oral, Daily    folic acid (FOLVITE) 1 mg, oral, Daily, 5 capsules    furosemide (LASIX) 10 mg, oral, Daily    gabapentin (NEURONTIN) 800 mg, oral, 3 times daily    hydroxychloroquine (PLAQUENIL) 200 mg, oral, Daily    ibuprofen 800 mg, oral, Every 8 hours PRN    levETIRAcetam (KEPPRA) 500 mg, oral, 2 times daily    metFORMIN (GLUCOPHAGE) 500 mg, oral, Daily with breakfast    metoprolol succinate XL (TOPROL-XL) 50 mg, oral, Daily, Do not crush or chew.    mv-min/folic/vit K/lycop/coQ10 (DAILY MULTIVITAMIN ORAL) 1 tablet, oral, Daily    nitroglycerin (NITROSTAT) 0.4 mg, sublingual, Every 5 minutes for up to 3 doses as needed for chest pain. Call 911 if pain persists.    ondansetron ODT (ZOFRAN-ODT) 4 mg, oral, Every 8 hours  PRN    rimegepant (Nurtec ODT) 75 mg tablet,disintegrating Dissolve 1 tablet (75 mg) by mouth on or under the tongue once daily as needed at the start of a migraine. Max 1 tablet per 24 hours.    sennosides (Senokot) 8.6 mg tablet 1 tablet, oral, As needed    topiramate (TOPAMAX) 100 mg, oral, 2 times daily         MR cervical spine wo IV contrast 02/11/2024  MR thoracic spine wo IV contrast 02/11/2024    Narrative  Interpreted By:  Dewayne Perez,  STUDY:  MR CERVICAL SPINE WO IV CONTRAST; MR THORACIC SPINE WO IV CONTRAST;  2/11/2024 10:20 am    INDICATION:  Signs/Symptoms: myelopathy.    COMPARISON:  Complete spine CT, 11/30/2023 and complete spine MRI, 11/22/2020    ACCESSION NUMBER(S):  [id number]; [id number]    ORDERING CLINICIAN:  JOSLYN SIMMONS    TECHNIQUE:  Sagittal T1, T2, STIR, axial T1 and T2 weighted images of the  cervical, thoracic, and lumbar spine were acquired.    FINDINGS:  Alignment: There is straightening of the normal cervical lordosis and  exaggeration of the normal thoracic kyphosis, the alignment is  preserved.    Vertebrae/Intervertebral Discs: The vertebral bodies demonstrate  expected height.  Changes of C4-6 laminectomy with associated  susceptibility artifact, unchanged from the recent CT but new from  the prior MRI. Degenerative endplate changes without associated  marrow edema. Multilevel loss of normal disc T2 signal and disc  height.    Cervical spine:    C1-2: Degenerative pannus without associated spinal canal narrowing.    C2-3: Disc osteophyte complex and facet and uncovertebral hypertrophy  with mild left neural foramen narrowing, similar to previous.    C3-4: Disc osteophyte complex and facet and uncovertebral hypertrophy  with mild spinal canal narrowing as well as mild right and moderate  left neural foramen narrowing, questionably worsened from previous.    C4-5: Postoperative changes with a disc osteophyte complex and facet  and uncovertebral hypertrophy. There is  unchanged mild bilateral  neural foramen narrowing, no residual spinal canal narrowing.    C5-6: Postoperative changes with a disc osteophyte complex and facet  and uncovertebral hypertrophy. Mild left neural foramen narrowing is  improved from previous, no residual spinal canal or right neural  foramen narrowing.    C6-7: Postoperative changes with a disc osteophyte complex and facet  and uncovertebral hypertrophy. Moderate left and severe right neural  foramen narrowing appears similar previous, no residual spinal canal  narrowing.    C7-T1: Disc osteophyte complex, facet and uncovertebral hypertrophy,  and ligamentum flavum thickening with mild bilateral neural foramen  narrowing and mild spinal canal narrowing, slightly worsened from  previous.    Thoracic spine: Multilevel disc bulges and facet hypertrophic  changes. There is persistent mild bilateral neural foramen narrowing  in the midthoracic spine from T5-10, no significant spinal canal  narrowing.    Cord: Normal in morphology and signal intensity.    Postoperative changes in the posterior soft tissues with associated  susceptibility artifact, granulation tissue, and paraspinal muscular  atrophy. No associated fluid collection. Partially visualized  dependent pulmonary opacities, likely scarring or atelectasis.    Impression  1. Changes of C4-6 laminectomy without residual spinal canal  narrowing, no abnormal cord signal.  2. Cervical spine degenerative changes, mildly progressed from 2019.  3. Thoracic spine degenerative changes without significant spinal  canal narrowing.    MACRO:  None    Signed by: Dewayne Perez 2/12/2024 10:24 AM  Dictation workstation:   [id number]     No image results found.       No diagnosis found.     ASSESSMENT/PLAN  Una Gallegos is a 58 y.o. female with a past medical history of fibromyalgia, diabetes, hypertension, hyperlipidemia, multiple sclerosis, seizure disorder, and cervical disc disorder with chronic neck pain  who is presenting for follow-up evaluation of right sided low back pain.      Based on history, available imaging and physical exam, the patient's primary pain etiology is likely due to lumbar radiculopathy secondary to lumbar spinal stenosis.  Given the patient's acute onset of symptoms with right-sided low back pain and radiation down the right leg although to the ankle, as well as positive straight leg raise on exam indicates symptoms of lumbar ductulitis.  Unfortunately her recent imaging in February with only cervical and thoracic spine and there is no recent imaging that demonstrates lumbar degeneration.  After discussion with the patient we will proceed with lumbar interlaminar epidural steroid injection to address her back pain.  If the pain is not relieved after injection we will proceed with a lumbar MRI to further evaluate the problem.  Additionally to address patient's thoracic muscle spasm pains which not related to muscle relaxers or pain medications we will refer the patient to physical therapy to help strengthen and stretch out the muscles.       Our plan is as follows:  - Lumbar epidural steroid injection  - Referral to Physical therapy  - Continue to participate in physical therapy as well as physician directed home exercises  - Continue pain medications as prescribed       Devin Vasquez MD

## 2024-04-14 DIAGNOSIS — G40.909 SEIZURE DISORDER (MULTI): ICD-10-CM

## 2024-04-14 DIAGNOSIS — G89.29 OTHER CHRONIC PAIN: ICD-10-CM

## 2024-04-15 RX ORDER — LEVETIRACETAM 500 MG/1
500 TABLET ORAL 2 TIMES DAILY
Qty: 180 TABLET | Refills: 0 | Status: SHIPPED | OUTPATIENT
Start: 2024-04-15 | End: 2024-05-28

## 2024-04-15 RX ORDER — TOPIRAMATE 100 MG/1
100 TABLET, FILM COATED ORAL 2 TIMES DAILY
Qty: 180 TABLET | Refills: 1 | Status: SHIPPED | OUTPATIENT
Start: 2024-04-15

## 2024-05-02 DIAGNOSIS — E66.9 CLASS 1 OBESITY WITHOUT SERIOUS COMORBIDITY WITH BODY MASS INDEX (BMI) OF 31.0 TO 31.9 IN ADULT, UNSPECIFIED OBESITY TYPE: ICD-10-CM

## 2024-05-02 DIAGNOSIS — R73.03 PREDIABETES: ICD-10-CM

## 2024-05-02 RX ORDER — LIDOCAINE HYDROCHLORIDE 10 MG/ML
5 INJECTION, SOLUTION EPIDURAL; INFILTRATION; INTRACAUDAL; PERINEURAL ONCE
OUTPATIENT
Start: 2024-05-02 | End: 2024-05-02

## 2024-05-02 RX ORDER — DEXAMETHASONE SODIUM PHOSPHATE 100 MG/10ML
10 INJECTION INTRAMUSCULAR; INTRAVENOUS ONCE
OUTPATIENT
Start: 2024-05-02 | End: 2024-05-02

## 2024-05-02 RX ORDER — MIDAZOLAM HYDROCHLORIDE 1 MG/ML
2 INJECTION, SOLUTION INTRAMUSCULAR; INTRAVENOUS ONCE
OUTPATIENT
Start: 2024-05-03 | End: 2024-05-03

## 2024-05-02 NOTE — H&P
History Of Present Illness  Una Gallegos is a 58 y.o. female presenting with lumbar radiculitis who is scheduled to receive lumbar epidural steroid injection.     Past Medical History  Past Medical History:   Diagnosis Date    Asthma ([facility])     Chronic headaches     Migraines    COVID-19     COVID + 2023    Depression     Diabetes mellitus (Multi)     10/23/23 A1C 6.0    Encounter for electrocardiogram 2024    EKG on 24    Fibromyalgia, primary     Fractures     Painful orthopaedic Closed fracture of left ankle with routine healing,    Gastritis     History of blood transfusion     Several years ago    Hyperlipidemia     Hypertension     Joint pain     Lupus (Multi)     MS (multiple sclerosis) (Multi)     F/W Dr. Silvia Molina LOV 2023    Painful orthopaedic hardware (CMS-HCC)     Ortho: [facility]    Personal history of other diseases of the musculoskeletal system and connective tissue     Personal history of fibromyalgia    Seizure disorder (Multi)     Last occurence in     Stroke (Multi)     CVA in        Surgical History  Past Surgical History:   Procedure Laterality Date    APPENDECTOMY  2014    Appendectomy    CERVICAL FUSION       SECTION, CLASSIC       Section x2    COLONOSCOPY      CT ANGIO NECK  12/10/2019    CT NECK ANGIO W AND WO IV CONTRAST 12/10/2019 [facility] [place] [facility]    CT ANGIO NECK  2022    CT NECK ANGIO W AND WO IV CONTRAST 2022 [facility] [place] [facility]    CT HEAD ANGIO W AND WO IV CONTRAST  12/10/2019    CT HEAD ANGIO W AND WO IV CONTRAST 12/10/2019 [facility] [place] [facility]    CT HEAD ANGIO W AND WO IV CONTRAST  2022    CT HEAD ANGIO W AND WO IV CONTRAST 2022 [facility] [place] [facility]    HYSTERECTOMY  10/05/2015    Hysterectomy    MR HEAD ANGIO WO IV CONTRAST  2020    MR HEAD ANGIO WO IV CONTRAST 2020 [facility] [place] [facility]    MR HEAD ANGIO WO IV CONTRAST  2021    MR HEAD ANGIO WO IV CONTRAST 2021 [facility] [place]  [facility]ENCY [facility]GAC[facility]    MR NECK ANGIO WO IV CONTRAST  08/26/2020    MR NECK ANGIO WO IV CONTRAST 8/26/2020 [facility] [place] [facility]    MR NECK ANGIO WO IV CONTRAST  09/21/2021    MR NECK ANGIO WO IV CONTRAST 9/21/2021 [facility] [place] [facility]    ORIF TIBIA FRACTURE          Social History  She reports that she has quit smoking. Her smoking use included cigarettes. She has never used smokeless tobacco. She reports that she does not drink alcohol and does not use drugs.    Family History  Family History   Problem Relation Name Age of Onset    Hypertension Mother      Cancer Father      Eating disorder Sister      Other (hoarding behavior) Sister      Lupus Mother's Sister          Systemic    Rheum arthritis Other Aunt         Allergies  Patient has no known allergies.    Review of Systems   13 point ROS done and negative except for the above.   Physical Exam     General: NAD, well nourished   Eyes: Non-icteric sclera, EOMI  Ears, Nose, Mouth, and Throat: External ears and nose appear to be without deformity or rash. No lesions or masses noted. Hearing is grossly intact.   Neck: Trachea midline  Respiratory: Nonlabored breathing   Cardiovascular: No JVD  Skin: No rashes or open lesions/ulcers identified on skin.    Last Recorded Vitals  There were no vitals taken for this visit.    Relevant Results           Assessment/Plan   Problem List Items Addressed This Visit    None      Una Gallegos is a 58 y.o. female presenting with lumbar radiculitis who is scheduled to receive lumbar epidural steroid injection.    - scheduled to receive lumbar epidural steroid injection  - Continue physical therapy and home exercises  - Continue to take pain medications as prescribed    Risks, benefits, alternatives to the procedure were discussed in detail and patient was amenable to proceeding.    Martin Hilario MD   PGY2 Anesthesiology

## 2024-05-03 ENCOUNTER — HOSPITAL ENCOUNTER (OUTPATIENT)
Dept: OPERATING ROOM | Facility: CLINIC | Age: 59
Setting detail: OUTPATIENT SURGERY
Discharge: HOME | End: 2024-05-03
Payer: COMMERCIAL

## 2024-05-03 ENCOUNTER — HOSPITAL ENCOUNTER (OUTPATIENT)
Dept: RADIOLOGY | Facility: CLINIC | Age: 59
Discharge: HOME | End: 2024-05-03
Payer: COMMERCIAL

## 2024-05-03 VITALS
WEIGHT: 191.58 LBS | OXYGEN SATURATION: 97 % | BODY MASS INDEX: 30.07 KG/M2 | RESPIRATION RATE: 16 BRPM | HEIGHT: 67 IN | SYSTOLIC BLOOD PRESSURE: 116 MMHG | HEART RATE: 83 BPM | TEMPERATURE: 96.8 F | DIASTOLIC BLOOD PRESSURE: 57 MMHG

## 2024-05-03 DIAGNOSIS — M54.16 LUMBAR RADICULITIS: ICD-10-CM

## 2024-05-03 PROCEDURE — 62323 NJX INTERLAMINAR LMBR/SAC: CPT | Performed by: PAIN MEDICINE

## 2024-05-03 PROCEDURE — 2500000005 HC RX 250 GENERAL PHARMACY W/O HCPCS: Mod: SE | Performed by: PAIN MEDICINE

## 2024-05-03 PROCEDURE — 7100000009 HC PHASE TWO TIME - INITIAL BASE CHARGE

## 2024-05-03 PROCEDURE — 99152 MOD SED SAME PHYS/QHP 5/>YRS: CPT

## 2024-05-03 PROCEDURE — 2550000001 HC RX 255 CONTRASTS: Mod: SE | Performed by: PAIN MEDICINE

## 2024-05-03 PROCEDURE — 7100000010 HC PHASE TWO TIME - EACH INCREMENTAL 1 MINUTE

## 2024-05-03 PROCEDURE — A4216 STERILE WATER/SALINE, 10 ML: HCPCS | Mod: SE | Performed by: PAIN MEDICINE

## 2024-05-03 PROCEDURE — 2500000004 HC RX 250 GENERAL PHARMACY W/ HCPCS (ALT 636 FOR OP/ED): Mod: SE | Performed by: PAIN MEDICINE

## 2024-05-03 RX ORDER — METFORMIN HYDROCHLORIDE 500 MG/1
500 TABLET ORAL
Qty: 90 TABLET | Refills: 3 | Status: SHIPPED | OUTPATIENT
Start: 2024-05-03 | End: 2025-05-03

## 2024-05-03 RX ORDER — MIDAZOLAM HYDROCHLORIDE 1 MG/ML
INJECTION INTRAMUSCULAR; INTRAVENOUS AS NEEDED
Status: COMPLETED | OUTPATIENT
Start: 2024-05-03 | End: 2024-05-03

## 2024-05-03 RX ORDER — SODIUM CHLORIDE 9 MG/ML
INJECTION, SOLUTION INTRAMUSCULAR; INTRAVENOUS; SUBCUTANEOUS AS NEEDED
Status: COMPLETED | OUTPATIENT
Start: 2024-05-03 | End: 2024-05-03

## 2024-05-03 RX ORDER — LIDOCAINE HYDROCHLORIDE 5 MG/ML
INJECTION, SOLUTION INFILTRATION; PERINEURAL AS NEEDED
Status: COMPLETED | OUTPATIENT
Start: 2024-05-03 | End: 2024-05-03

## 2024-05-03 RX ORDER — DEXAMETHASONE SODIUM PHOSPHATE 100 MG/10ML
INJECTION INTRAMUSCULAR; INTRAVENOUS AS NEEDED
Status: COMPLETED | OUTPATIENT
Start: 2024-05-03 | End: 2024-05-03

## 2024-05-03 RX ADMIN — Medication 1 ML: at 07:51

## 2024-05-03 RX ADMIN — DEXAMETHASONE SODIUM PHOSPHATE 10 MG: 10 INJECTION INTRAMUSCULAR; INTRAVENOUS at 07:56

## 2024-05-03 RX ADMIN — MIDAZOLAM HYDROCHLORIDE 2 MG: 1 INJECTION, SOLUTION INTRAMUSCULAR; INTRAVENOUS at 07:46

## 2024-05-03 RX ADMIN — LIDOCAINE HYDROCHLORIDE 5 ML: 5 INJECTION, SOLUTION INFILTRATION; PERINEURAL at 07:51

## 2024-05-03 RX ADMIN — SODIUM CHLORIDE 5 ML: 9 INJECTION INTRAMUSCULAR; INTRAVENOUS; SUBCUTANEOUS at 07:46

## 2024-05-03 RX ADMIN — IOHEXOL 1 ML: 240 INJECTION, SOLUTION INTRATHECAL; INTRAVASCULAR; INTRAVENOUS; ORAL at 07:55

## 2024-05-03 RX ADMIN — LIDOCAINE HYDROCHLORIDE 4 ML: 5 INJECTION, SOLUTION INFILTRATION; PERINEURAL at 07:57

## 2024-05-03 ASSESSMENT — PAIN - FUNCTIONAL ASSESSMENT
PAIN_FUNCTIONAL_ASSESSMENT: 0-10

## 2024-05-03 ASSESSMENT — PAIN SCALES - GENERAL
PAINLEVEL_OUTOF10: 8

## 2024-05-03 ASSESSMENT — COLUMBIA-SUICIDE SEVERITY RATING SCALE - C-SSRS
1. IN THE PAST MONTH, HAVE YOU WISHED YOU WERE DEAD OR WISHED YOU COULD GO TO SLEEP AND NOT WAKE UP?: NO
6. HAVE YOU EVER DONE ANYTHING, STARTED TO DO ANYTHING, OR PREPARED TO DO ANYTHING TO END YOUR LIFE?: NO
2. HAVE YOU ACTUALLY HAD ANY THOUGHTS OF KILLING YOURSELF?: NO

## 2024-05-03 NOTE — OP NOTE
* No procedures listed * Operative Note     Date: 5/3/2024  OR Location: [facility] NON-OR PROCEDURES    Name: Una Gallegos, : 1965, Age: 58 y.o., MRN: [id number], Sex: female    Diagnosis  Lumbar radiculitis * No Diagnosis Codes entered *     Procedures  Lumbar epidural steroid injection    Surgeons   MD Maged    Resident/Fellow/Other Assistant:  * No surgeons found in log *    Procedure Summary  Anesthesia: local and sedatio* No anesthesia type entered *  ASA: ASA status not filed in the log.  Anesthesia Staff: No anesthesia staff entered.  Estimated Blood Loss: 0 mL  Intra-op Medications: * Intraprocedure medication information is unavailable because the case start and end events have not been set *      Intraprocedure I/O Totals       None           Specimen: No specimens collected     Staff:   No surgical staff documented.         Drains and/or Catheters: * None in log *    Tourniquet Times:         Implants:     Findings:     Indications: Una Gallegos is an 58 y.o. female who is having surgery for lumbar radiculitis    The patient was seen in the preoperative area. The risks, benefits, complications, treatment options, non-operative alternatives, expected recovery and outcomes were discussed with the patient. The possibilities of reaction to medication, pulmonary aspiration, injury to surrounding structures, bleeding, recurrent infection, the need for additional procedures, failure to diagnose a condition, and creating a complication requiring transfusion or operation were discussed with the patient. The patient concurred with the proposed plan, giving informed consent.  The site of surgery was properly noted/marked if necessary per policy. The patient has been actively warmed in preoperative area. Preoperative antibiotics are not indicated. Venous thrombosis prophylaxis are not indicated.    Procedure Details: pt identified. Consented. IV in place.pt to OR [facility] [place]. Time out consented. Pt  Therapy plan orders ; plan has been updated. Patient remains on the same medication regimen of Entyvio 300mg Q6w. Standing lab orders placed, including BMP. Hepatitis B serologies from 23 indicate immunity. Quant Gold TB test negative 22. Last office visit was 22 with Rupert Dsouza. Next office visit is scheduled for 23 with same provider. Patient receives infusions with [facility] when in [place]. [facility] coordinates infusions for her in [facility] when she is there.     transferred to OR table. Regular monitors. Lumbar area exposed. Scrubbed in regular fashion. By fluoro guidance, L4/5 epidural space identified. Skin wheal using half percent lidocaine. 18g taughy needle inserted and advance by fluoro guidance to epidural space. Identified by loss of resistance confirmed by contrast. 5 ml of lidocaine  half percent with 10 mg dexamethasone injected   Complications:  None; patient tolerated the procedure well.    Disposition: PACU - hemodynamically stable.  Condition: stable         Additional Details:     Attending Attestation: I was present for the entire procedure.    md Maged

## 2024-05-06 ENCOUNTER — SPECIALTY PHARMACY (OUTPATIENT)
Dept: PHARMACY | Facility: CLINIC | Age: 59
End: 2024-05-06

## 2024-05-06 PROCEDURE — RXMED WILLOW AMBULATORY MEDICATION CHARGE

## 2024-05-07 ENCOUNTER — OFFICE VISIT (OUTPATIENT)
Dept: ORTHOPEDIC SURGERY | Facility: HOSPITAL | Age: 59
End: 2024-05-07
Payer: COMMERCIAL

## 2024-05-07 ENCOUNTER — HOSPITAL ENCOUNTER (OUTPATIENT)
Dept: RADIOLOGY | Facility: HOSPITAL | Age: 59
Discharge: HOME | End: 2024-05-07
Payer: COMMERCIAL

## 2024-05-07 DIAGNOSIS — S82.873D: ICD-10-CM

## 2024-05-07 DIAGNOSIS — S82.839D: ICD-10-CM

## 2024-05-07 PROCEDURE — 73610 X-RAY EXAM OF ANKLE: CPT | Mod: LT

## 2024-05-07 PROCEDURE — 3008F BODY MASS INDEX DOCD: CPT | Performed by: ORTHOPAEDIC SURGERY

## 2024-05-07 PROCEDURE — 99024 POSTOP FOLLOW-UP VISIT: CPT | Performed by: ORTHOPAEDIC SURGERY

## 2024-05-07 PROCEDURE — 73610 X-RAY EXAM OF ANKLE: CPT | Mod: LEFT SIDE | Performed by: RADIOLOGY

## 2024-05-07 NOTE — PROGRESS NOTES
Subjective    Patient ID: Una Gallegos is a 58 y.o. female.    Chief Complaint: No chief complaint on file.     Last Surgery: Left ankle removal of hardware  Last Surgery Date: 2/8/2024    HPI  Patient is a 58 y.o. female who is s/p left ankle removal of hardware. Date of surgery was 2/8/2024. Patient continues to be weight bearing at this time.  Patient reported that she has done very well.  Her wound is completely healed.  No drainage.  Her preoperative hardware pain is completely resolved.  Her only complaint today is diminished sensation about the sural nerve distribution on the lateral aspect of the foot.  She is very happy with her outcome.  ROS: All other systems have been reviewed and are negative except as previously noted in history of present illness.      IMP:  Problem List Items Addressed This Visit       Closed fracture of tibial plafond with fibula involvement with routine healing    Relevant Orders    XR ankle left 3+ views     Objective   General: Alert and oriented x 3, NAD, respirations easy and unlabored with no audible wheezes, skin warm and dry, speech and dress appropriate for noted age, affect euthymic.     Musculoskeletal: Left Ankle  incisions c/d/i  No swelling at bimalleolar ankle  compartments soft  no calf tenderness  sensation intact to light touch  motor intact including TA/GS/EHL  palpable DP/PT pulses 2+   Good range of motion of the ankle.  No tenderness to palpation along the incision about the ankle.  X-ray: Images of left ankle reviewed personally by me today and reveals interval change of removed tibia and fibula hardware. Ankle mortise is intact.     Assessment/Plan   Encounter Diagnoses:  Closed fracture of tibial plafond with fibula involvement with routine healing, unspecified laterality, subsequent encounter    PLAN: Patient is s/p left ankle removal of hardware.  Patient has good function.  No pain.  We discussed the diminished sensation of the sural nerve  distribution.  Will continue to observe.  He should improve over time.  At this point she will perform activities as tolerated with any restriction.  Given that she is doing well she will follow-up with me unless in the future.  Orders Placed This Encounter    XR ankle left 3+ views     No follow-ups on file.

## 2024-05-11 ENCOUNTER — PHARMACY VISIT (OUTPATIENT)
Dept: PHARMACY | Facility: CLINIC | Age: 59
End: 2024-05-11
Payer: MEDICAID

## 2024-05-20 DIAGNOSIS — G35 MULTIPLE SCLEROSIS (MULTI): ICD-10-CM

## 2024-05-23 RX ORDER — GABAPENTIN 400 MG/1
800 CAPSULE ORAL 3 TIMES DAILY
Qty: 180 CAPSULE | Refills: 0 | Status: SHIPPED | OUTPATIENT
Start: 2024-05-23 | End: 2024-06-22

## 2024-05-24 ENCOUNTER — SPECIALTY PHARMACY (OUTPATIENT)
Dept: PHARMACY | Facility: CLINIC | Age: 59
End: 2024-05-24

## 2024-05-24 DIAGNOSIS — G43.119 MIGRAINE WITH AURA, INTRACTABLE, WITHOUT STATUS MIGRAINOSUS: ICD-10-CM

## 2024-06-04 ENCOUNTER — SPECIALTY PHARMACY (OUTPATIENT)
Dept: PHARMACY | Facility: CLINIC | Age: 59
End: 2024-06-04

## 2024-06-07 ENCOUNTER — SPECIALTY PHARMACY (OUTPATIENT)
Dept: PHARMACY | Facility: CLINIC | Age: 59
End: 2024-06-07

## 2024-06-13 ENCOUNTER — SPECIALTY PHARMACY (OUTPATIENT)
Dept: PHARMACY | Facility: CLINIC | Age: 59
End: 2024-06-13

## 2024-07-02 DIAGNOSIS — Z86.73 H/O: CVA (CEREBROVASCULAR ACCIDENT): ICD-10-CM

## 2024-07-02 RX ORDER — ATORVASTATIN CALCIUM 80 MG/1
80 TABLET, FILM COATED ORAL NIGHTLY
Qty: 90 TABLET | Refills: 3 | Status: SHIPPED | OUTPATIENT
Start: 2024-07-02

## 2024-07-08 ENCOUNTER — SPECIALTY PHARMACY (OUTPATIENT)
Dept: NEUROLOGY | Facility: HOSPITAL | Age: 59
End: 2024-07-08
Payer: COMMERCIAL

## 2024-07-08 NOTE — PROGRESS NOTES
[facility] Clinical Note    Una Gallegos is a 59 y.o. female, who is on the specialty pharmacy service for management of:  Multiple Sclerosis Core.    Una Gallegos is taking: dimethyl fumarate 240 mg capsules.      Una was contacted on 7/8/2024 at 10:34 AM for a virtual pharmacy visit with encounter number [id number] from:   [place] [address]  [facility] 5TH FLOOR  [facility] [address]  Dept: [phone]  Dept Fax: [phone]  Loc: [phone]    Una was offered a Telemedicine Video visit or Telephone visit.  Una consented to a telephone visit, which was performed.    The most recent encounter visit with the referring prescriber Silvia Molina DNP was reviewed.  Pharmacy will continue to collaborate in the care of this patient with the referring prescriber Silvia Molina DNP.    General Assessment      Impression/Plan  IMPRESSION/PLAN:  Is patient high risk (potential patients:  pregnancy, geriatric, pediatric)?  no  Is laboratory follow-up needed? no  Is a clinical intervention needed? no  Next reassessment date? 10/8/2024  Additional comments: n/a    Refer to the encounter summary report for documentation details about patient counseling and education.      Medication Adherence    The importance of adherence was discussed with the patient and they were advised to take the medication as prescribed by their provider. Patient was encouraged to call their physician's office if they have a question regarding a missed dose.     QOL/Patient Satisfaction  Rate your quality of life on scale of 1-10: 10 - Completely satisfied  Rate your satisfaction with  Specialty Pharmacy on scale of 1-10: 10 - Completely satisfied      Patient was advised to contact the pharmacy if there are any changes to their medication list, including prescriptions, OTC medications, herbal products, or supplements. Patient was advised of  [facility]'s dispensing process, refill timeline, contact information ([phone]), and patient management follow up. Patient confirmed understanding of education conducted during assessment. All patient questions and concerns were addressed to the best of my ability. Patient was encouraged to contact the specialty pharmacy with any questions or concerns.    Confirmed follow-up outreaches are properly scheduled and reviewed goals of therapy with the patient.        Elmo Hill, PharmD

## 2024-08-19 ENCOUNTER — HOSPITAL ENCOUNTER (OUTPATIENT)
Dept: RADIOLOGY | Facility: HOSPITAL | Age: 59
Discharge: HOME | End: 2024-08-19
Payer: COMMERCIAL

## 2024-08-19 DIAGNOSIS — M79.604 PAIN IN RIGHT LEG: ICD-10-CM

## 2024-08-19 PROCEDURE — 93971 EXTREMITY STUDY: CPT | Performed by: RADIOLOGY

## 2024-08-19 PROCEDURE — 93970 EXTREMITY STUDY: CPT

## 2024-09-10 ENCOUNTER — SPECIALTY PHARMACY (OUTPATIENT)
Dept: PHARMACY | Facility: CLINIC | Age: 59
End: 2024-09-10

## 2024-09-10 ENCOUNTER — PHARMACY VISIT (OUTPATIENT)
Dept: PHARMACY | Facility: CLINIC | Age: 59
End: 2024-09-10
Payer: MEDICAID

## 2024-09-10 PROCEDURE — RXMED WILLOW AMBULATORY MEDICATION CHARGE

## 2024-09-16 ENCOUNTER — APPOINTMENT (OUTPATIENT)
Dept: RHEUMATOLOGY | Facility: CLINIC | Age: 59
End: 2024-09-16
Payer: COMMERCIAL

## 2024-09-19 ENCOUNTER — APPOINTMENT (OUTPATIENT)
Dept: PAIN MEDICINE | Facility: CLINIC | Age: 59
End: 2024-09-19
Payer: COMMERCIAL

## 2024-09-19 ENCOUNTER — OFFICE VISIT (OUTPATIENT)
Dept: NEUROLOGY | Facility: CLINIC | Age: 59
End: 2024-09-19
Payer: COMMERCIAL

## 2024-09-19 VITALS — BODY MASS INDEX: 30.85 KG/M2 | WEIGHT: 197 LBS

## 2024-09-19 DIAGNOSIS — M54.16 LUMBAR RADICULOPATHY: ICD-10-CM

## 2024-09-19 DIAGNOSIS — G35 MULTIPLE SCLEROSIS (MULTI): Primary | ICD-10-CM

## 2024-09-19 PROCEDURE — 99214 OFFICE O/P EST MOD 30 MIN: CPT | Performed by: PAIN MEDICINE

## 2024-09-19 PROCEDURE — 99213 OFFICE O/P EST LOW 20 MIN: CPT | Performed by: NURSE PRACTITIONER

## 2024-09-19 ASSESSMENT — PAIN - FUNCTIONAL ASSESSMENT: PAIN_FUNCTIONAL_ASSESSMENT: 0-10

## 2024-09-19 ASSESSMENT — PAIN SCALES - GENERAL
PAINLEVEL: 10-WORST PAIN EVER
PAINLEVEL_OUTOF10: 6

## 2024-09-19 NOTE — PROGRESS NOTES
Subjective   Patient ID: Una Gallegos is a 58 y.o. female with a past medical history of fibromyalgia, diabetes, hypertension, hyperlipidemia, multiple sclerosis, seizure disorder, chronic neck pain, and chronic lower back pain. She presents with worsening lower back pain with bilateral radicular pain worse on the right.      HPI:   Patient presents with lower back radiating down her R leg to her ankle and down to her left buttock. This pain flared up and has been worsening for about 2 months. She got an L4-5 epidural injection on 5/3/24 which did help with her pain but it has returned. The pain is worse in the morning and worse with sitting/driving. It is a sharp stabbing pain. It is a 10/10 on the R and a 3/10 on the L. She also has had increasing difficulty with going upstairs but she has chronic weakness on her L side due to her MS. She does not have issues with bowel or bladder incontinence, but reported urinary frequency and urgency. She did PT back in July and continues to do home exercises which she says help. She reports taking gabapentin, topiramate, and tizanidine. She has numbness on her lateral L foot and ankle that has been worsening. She fractured Her L ankle previously and had the hardware removed from it one year ago. She has been getting weekly infusions for pain that include narcotic, antiemetics, and vitamins.      0-10 (Numeric) Pain Score: 6       Review of Systems   13-point ROS done and negative except for HPI.     Current Outpatient Medications   Medication Instructions    acetaminophen (TYLENOL) 500 mg, oral, Every 6 hours PRN    albuterol 90 mcg/actuation inhaler inhalation    ascorbic acid (VITAMIN C) 250 mg, oral, Daily    atorvastatin (LIPITOR) 80 mg, oral, Nightly    azelastine (Astelin) 137 mcg (0.1 %) nasal spray 2 sprays, nasal, 2 times daily    dicyclomine (BENTYL) 20 mg, oral, 4 times daily PRN    dimethyl fumarate (Tecfidera) 240 mg capsule,delayed release(DR/EC) capsule  TAKE ONE (1) CAPSULE BY MOUTH TWICE DAILY. SWALLOW WHOLE. DO NOT OPEN CAPSULE. DO NOT CRUSH OR CHEW.    docusate sodium (COLACE) 100 mg, oral, 2 times daily    escitalopram (LEXAPRO) 10 mg, oral, Daily    folic acid (FOLVITE) 1 mg, oral, Daily, 5 capsules    furosemide (LASIX) 10 mg, oral, Daily    gabapentin (NEURONTIN) 800 mg, oral, 3 times daily    hydroxychloroquine (PLAQUENIL) 200 mg, oral, Daily    ibuprofen 800 mg, oral, Every 8 hours PRN    levETIRAcetam (KEPPRA) 500 mg, oral, 2 times daily    metFORMIN (GLUCOPHAGE) 500 mg, oral, Daily with breakfast    metoprolol succinate XL (TOPROL-XL) 50 mg, oral, Daily, Do not crush or chew.    mv-min/folic/vit K/lycop/coQ10 (DAILY MULTIVITAMIN ORAL) 1 tablet, oral, Daily    nitroglycerin (NITROSTAT) 0.4 mg, sublingual, Every 5 minutes for up to 3 doses as needed for chest pain. Call 911 if pain persists.    ondansetron ODT (ZOFRAN-ODT) 4 mg, oral, Every 8 hours PRN    rimegepant (NURTEC) 75 mg tablet,disintegrating Dissolve 1 tablet (75 mg) by mouth on or under the tongue once daily as needed at the start of a migraine. Max 1 tablet per 24 hours.    sennosides (Senokot) 8.6 mg tablet 1 tablet, oral, As needed    topiramate (TOPAMAX) 100 mg, oral, 2 times daily       Past Medical History:   Diagnosis Date    Asthma ([facility])     Chronic headaches     Migraines    COVID-19     COVID + 5/2023    Depression     Diabetes mellitus (Multi)     10/23/23 A1C 6.0    Encounter for electrocardiogram 01/2024    EKG on 1/2/24    Fibromyalgia, primary     Fractures     Painful orthopaedic Closed fracture of left ankle with routine healing,    Gastritis     History of blood transfusion     Several years ago    Hyperlipidemia     Hypertension     Joint pain     Lupus (Multi)     MS (multiple sclerosis) (Multi)     F/W Dr. Silvia Molina LOV 11/2023    Painful orthopaedic hardware ([facility])     Ortho: Akil Collier    Personal history of other diseases of the musculoskeletal system  and connective tissue     Personal history of fibromyalgia    Seizure disorder (Multi)     Last occurence in     Stroke (Multi)     CVA in         Past Surgical History:   Procedure Laterality Date    APPENDECTOMY  2014    Appendectomy    CERVICAL FUSION       SECTION, CLASSIC       Section x2    COLONOSCOPY      CT ANGIO NECK  12/10/2019    CT NEC[place] ANGIO W AND WO [place] [facility] [place]    CT ANGIO NECK  2022    CT NECK ANGIO W AND WO IV CONTRAST 20[place] [facility] [place]    CT HEAD ANGIO W AND WO IV CONTRAST  12/10/2019    CT HEAD ANGIO W AND WO IV CONTRAST [place] [facility]    CT HEAD ANGIO W AND WO IV CONTRAST  2022    CT HEAD ANGIO W AND WO IV CONTRAST 20[place] [facility]    HYSTERECTOMY  10/05/2015    Hysterectomy    MR HEAD ANGIO WO IV CONTRAST  2020    MR HEAD ANGIO WO IV CONTRAST [place] [facility]    MR HEAD ANGIO WO IV CONTRAST  2021    MR HEAD ANGIO WO IV CONTRAST [place] [facility]    MR NECK ANGIO WO IV CONTRAST  2020    MR NECK ANGIO WO IV CONTRAST 2020 [facility]    MR NECK ANGIO WO IV CONTRAST  2021    MR NECK ANGIO WO IV CONTRAST 2021 [facility]    ORIF TIBIA FRACTURE          Family History   Problem Relation Name Age of Onset    Hypertension Mother      Cancer Father      Eating disorder Sister      Other (hoarding behavior) Sister      Lupus Mother's Sister          Systemic    Rheum arthritis Other Aunt         No Known Allergies     Objective     There were no vitals filed for this visit.     Physical Exam  General: NAD, well groomed, well nourished  Eyes: Non-icteric sclera, EOMI  Ears, Nose, Mouth, and Throat: External ears and nose appear to be without deformity or rash. No lesions or masses noted. Hearing is grossly intact.   Neck: Trachea midline  Respiratory: Nonlabored breathing   Cardiovascular: no peripheral edema    Skin: No rashes or open lesions/ulcers identified on skin.    Back:   Palpation: Tenderness to palpation over lumbar paraspinous muscles and upper buttocks bilaterally.   Straight leg raise: positive at 30 degrees on the right   ZEUS Maneuver does reproduce pain on the right    Hip: No pain over greater trochanters. and Pain not reproduced with hip internal/external rotation.     Neurologic:   Cranial nerves grossly intact.   Strength 4/5 strength in LLE, 5/5 strength in RLE  Sensation: decreased sensation in L lateral foot and ankle      Psychiatric: Alert, orientation to person, place, and time. Cooperative.    Imaging personally reviewed and independently interpreted:   MR cervical spine wo IV contrast 02/11/2024  MR thoracic spine wo IV contrast 02/11/2024    Narrative  Interpreted By:  Dewayne Perez,  STUDY:  MR CERVICAL SPINE WO IV CONTRAST; MR THORACIC SPINE WO IV CONTRAST;  2/11/2024 10:20 am    INDICATION:  Signs/Symptoms: myelopathy.    COMPARISON:  Complete spine CT, 11/30/2023 and complete spine MRI, 11/22/2020    ACCESSION NUMBER(S):  [id number]; [id number]    ORDERING CLINICIAN:  JOSLYN SIMMONS    TECHNIQUE:  Sagittal T1, T2, STIR, axial T1 and T2 weighted images of the  cervical, thoracic, and lumbar spine were acquired.    FINDINGS:  Alignment: There is straightening of the normal cervical lordosis and  exaggeration of the normal thoracic kyphosis, the alignment is  preserved.    Vertebrae/Intervertebral Discs: The vertebral bodies demonstrate  expected height.  Changes of C4-6 laminectomy with associated  susceptibility artifact, unchanged from the recent CT but new from  the prior MRI. Degenerative endplate changes without associated  marrow edema. Multilevel loss of normal disc T2 signal and disc  height.    Cervical spine:    C1-2: Degenerative pannus without associated spinal canal narrowing.    C2-3: Disc osteophyte complex and facet and uncovertebral hypertrophy  with mild left neural  foramen narrowing, similar to previous.    C3-4: Disc osteophyte complex and facet and uncovertebral hypertrophy  with mild spinal canal narrowing as well as mild right and moderate  left neural foramen narrowing, questionably worsened from previous.    C4-5: Postoperative changes with a disc osteophyte complex and facet  and uncovertebral hypertrophy. There is unchanged mild bilateral  neural foramen narrowing, no residual spinal canal narrowing.    C5-6: Postoperative changes with a disc osteophyte complex and facet  and uncovertebral hypertrophy. Mild left neural foramen narrowing is  improved from previous, no residual spinal canal or right neural  foramen narrowing.    C6-7: Postoperative changes with a disc osteophyte complex and facet  and uncovertebral hypertrophy. Moderate left and severe right neural  foramen narrowing appears similar previous, no residual spinal canal  narrowing.    C7-T1: Disc osteophyte complex, facet and uncovertebral hypertrophy,  and ligamentum flavum thickening with mild bilateral neural foramen  narrowing and mild spinal canal narrowing, slightly worsened from  previous.    Thoracic spine: Multilevel disc bulges and facet hypertrophic  changes. There is persistent mild bilateral neural foramen narrowing  in the midthoracic spine from T5-10, no significant spinal canal  narrowing.    Cord: Normal in morphology and signal intensity.    Postoperative changes in the posterior soft tissues with associated  susceptibility artifact, granulation tissue, and paraspinal muscular  atrophy. No associated fluid collection. Partially visualized  dependent pulmonary opacities, likely scarring or atelectasis.    Impression  1. Changes of C4-6 laminectomy without residual spinal canal  narrowing, no abnormal cord signal.  2. Cervical spine degenerative changes, mildly progressed from 2019.  3. Thoracic spine degenerative changes without significant spinal  canal narrowing.    MACRO:  None    Signed  by: Dewayne Perez 2/12/2024 10:24 AM  Dictation workstation:   [id number]       Assessment/Plan   Patient presenting with lower back pain, bilateral buttock pain, and R sided radicular pain. She had L4-5 epidural steroid injection which did help, however her pain returned 2 months ago and has been worsening. She does not have a lumbar MR available, so will plan to get MRI and then lumbar spine injection based on MRI results.    Plan:  -MR lumbar spine  -Schedule for lumbar epidural steroid injection    The patient has failed treatment with : Physical therapy , three or more classes of medications, alternative therapies, have significant limitations of their quality of life due to the pain, have significant impairments of their activities of daily living (ADLs) due to the pain, and an MRI is medically necessary to diagnose their problem    We discussed  the risks, benefits and alternatives of the procedure including but not limited to: , Lack of efficacy , Transiently worsening pain , Bleeding, Infection , and Nerve Damage    Follow up: After procedure    The patient was invited to contact us back anytime with any questions or concerns and follow-up with us in the office as needed.     Diagnoses and all orders for this visit:  Lumbar radiculopathy  -     MR lumbar spine wo IV contrast; Future  -     Epidural Steroid Injection; Future  -     FL pain management; Future  Other orders  -     NPO Diet Except: Sips with meds; Effective now; Standing  -     Height and weight; Standing  -     Insert and maintain peripheral IV; Standing  -     Saline lock IV; Standing  -     Adult diet Regular; Standing  -     Vital Signs; Standing  -     Notify physician - Standard Parameters; Standing

## 2024-09-19 NOTE — PROGRESS NOTES
Patient name: Una Gallegos  Diagnosis if known: RRMS  Date of onset: 2003  Date of diagnosis: 2010  disease course at onset: RR  disease course now: RR  Current DMT: DMF  Previous DMTs: Tecfidera, Copaxone  Last MRI brain: 9/2023- stable.  Last MRI cervical: 11/3/17- stable  Last MRI thoracic: 11/3/17- stable  Last OCT:  CSF: 8/2014    Subjective   Una Gallegos is a 59 y.o. right-handed female here for a follow up of her MS, she takes DMF and tolerating will.  She reports severe lower back pain. She saw pain management today, and MRI of lumbar was ordered. Pain management wants to try lower back injections to help with lower back pain.      She has started a new job which requires a lot of sitting which cause pain.  She states her insurance will be ending on Sept. 30, 2024, so she wans to take care of her back issues an will wait on MRI of the brain until her 90 day probation period at her job.    ROS  Reports urinary frequency, sometimes constipation, no recent infections, stable gait when back is not in pain, and no trouble swallowing.    Objective   Neurological Exam  Mental Status  Alert. Oriented to person, place, time and situation. Oriented to person, place, and time. Speech is normal. Language is fluent with no aphasia. Attention and concentration are normal.    Cranial Nerves  CN I: Sense of smell is normal.  CN II: Right visual acuity: Finger movement. Left visual acuity: Finger movement. Right normal visual field. Left normal visual field. Right funduscopic exam: disc intact. Left funduscopic exam: disc intact.  CN III, IV, VI: Extraocular movements intact bilaterally. Normal lids and orbits bilaterally. Pupils equal round and reactive to light bilaterally.  CN V: Facial sensation is normal.  CN VII: Full and symmetric facial movement.  CN VIII: Hearing is normal.  CN IX, X: Palate elevates symmetrically  CN XI: Shoulder shrug strength is normal.  CN XII: Tongue midline without atrophy or  fasciculations.    Motor  Normal muscle bulk throughout. Normal muscle tone. Strength is 5/5 throughout all four extremities.  Could not perform due to pain..    Sensory  Light touch is normal in upper and lower extremities.     Reflexes                                            Right                      Left  Brachioradialis                    2+                         2+  Biceps                                 3+                         2+  Triceps                                3+                         2+  Patellar                                1+                         1+   Right palmar grasp present. Left palmar grasp present.    Coordination  Right: Finger-to-nose normal. Rapid alternating movement normal. Heel-to-shin abnormality: Could not perform d/t pain..  9 hole peg test: Right hand 36.3 sec. Left hand 59.6 sec..    Gait  Casual gait: Reduced right arm swing. Reduced left arm swing. Timed get up and go test: 8 seconds.  Slow cautious gait..    Physical Exam  Constitutional:       Appearance: Normal appearance.   HENT:      Head: Normocephalic.      Right Ear: Tympanic membrane normal.      Left Ear: Tympanic membrane normal.      Nose: Nose normal.      Mouth/Throat:      Mouth: Mucous membranes are moist.   Eyes:      General: Lids are normal.      Extraocular Movements: Extraocular movements intact.      Pupils: Pupils are equal, round, and reactive to light.   Musculoskeletal:         General: Normal range of motion.      Cervical back: No tenderness. Pain with movement present.   Skin:     General: Skin is warm and dry.   Neurological:      Mental Status: She is alert and oriented to person, place, and time.      Motor: Motor strength is normal.     Deep Tendon Reflexes:      Reflex Scores:       Tricep reflexes are 3+ on the right side and 2+ on the left side.       Bicep reflexes are 3+ on the right side and 2+ on the left side.       Brachioradialis reflexes are 2+ on the right side and 2+  on the left side.       Patellar reflexes are 1+ on the right side and 1+ on the left side.  Psychiatric:         Attention and Perception: Attention normal.         Mood and Affect: Mood normal.         Speech: Speech normal.         Behavior: Behavior is cooperative.         Thought Content: Thought content normal.         Cognition and Memory: Cognition normal.     Provider Impression  Una Gallegos is a 59 y.o. right-handed female here for a follow up of her MS, she takes DMF and tolerating will.  Last MRI of the brain was Sept. 2023 and stable. Could not perform full assessment d/t pain.    We discussed the importance of living healthy life style with diet and exercise and the importance of V-D in patient's with MS.    The total face to face appointment was 20 minutes and more than 50% of the visit was spent counseling and coordination of care.    I personally reviewed laboratory, radiographic, and medical studies which were pertinent for today's vist.    Plan  - Continue DMF.  - Follow up with pain management.  - Call office once you establish new health insurance.  - Follow up 6 months.

## 2024-09-24 NOTE — H&P
HISTORY AND PHYSICAL    History Of Present Illness  Una Gallegos is a 59 y.o. female presenting with chronic pain here for procedure as stated below. Patient denies any changes to health since the last visit to our clinic.    Past Medical History  Past Medical History:   Diagnosis Date    Asthma ([facility]     Chronic headaches     Migraines    COVID-19     COVID + 2023    Depression     Diabetes mellitus (Multi)     10/23/23 A1C 6.0    Encounter for electrocardiogram 2024    EKG on 24    Fibromyalgia, primary     Fractures     Painful orthopaedic Closed fracture of left ankle with routine healing,    Gastritis     History of blood transfusion     Several years ago    Hyperlipidemia     Hypertension     Joint pain     Lupus (Multi)     MS (multiple sclerosis) (Multi)     F/W Dr. Silvia Molina LOV 2023    Painful orthopaedic hardware (CMS-HCC)     Ortho: Akil Collier    Personal history of other diseases of the musculoskeletal system and connective tissue     Personal history of fibromyalgia    Seizure disorder (Multi)     Last occurence in     Stroke (Multi)     CVA in        Surgical History  Past Surgical History:   Procedure Laterality Date    APPENDECTOMY  2014    Appendectomy    CERVICAL FUSION       SECTION, CLASSIC       Section x2    COLONOSCOPY      CT ANGIO NECK  12/10/2019    CT NECK ANGIO W AND WO IV CONTRAST 12/10/2019 [facility] [place] EMERGENCY LEGACY    CT ANGIO NECK  2022    CT NECK ANGIO W AND WO IV CONTRAST 2022 [facility] [place] ANCILLARY LEGACY    CT HEAD ANGIO W AND WO IV CONTRAST  12/10/2019    CT HEAD ANGIO W AND WO IV CONTRAST 12/10/2019 [facility] [place] EMERGENCY LEGACY    CT HEAD ANGIO W AND WO IV CONTRAST  2022    CT HEAD ANGIO W AND WO IV CONTRAST 2022 [facility] [place] ANCILLARY LEGACY    HYSTERECTOMY  10/05/2015    Hysterectomy    MR HEAD ANGIO WO IV CONTRAST  2020    MR HEAD ANGIO WO IV CONTRAST 2020 [facility] [place] EMERGENCY LEGACY    MR HEAD ANGIO WO IV CONTRAST   09/21/2021    MR HEAD ANGIO WO IV CONTRAST 9/21/2021 [facility]    MR NECK ANGIO WO IV CONTRAST  08/26/2020    MR NECK ANGIO WO IV CONTRAST 8/26/2020 [facility] [place] [facility]    MR NECK ANGIO WO IV CONTRAST  09/21/2021    MR NECK ANGIO WO IV CONTRAST 9/21/2021 [facility] [place]    ORIF TIBIA FRACTURE          Social History  She reports that she has been smoking cigarettes. She has never used smokeless tobacco. She reports that she does not drink alcohol and does not use drugs.    Family History  Family History   Problem Relation Name Age of Onset    Hypertension Mother      Cancer Father      Eating disorder Sister      Other (hoarding behavior) Sister      Lupus Mother's Sister          Systemic    Rheum arthritis Other Aunt         Allergies  Patient has no known allergies.    Review of Systems   12 point ROS done and negative except for the above.   Physical Exam     ASA 3    Sedation requested  General: NAD, well groomed, well nourished  Eyes: Non-icteric sclera, EOMI  Ears, Nose, Mouth, and Throat: External ears and nose appear to be without deformity or rash. No lesions or masses noted. Hearing is grossly intact.   Neck: Trachea midline  Respiratory: Nonlabored breathing   Cardiovascular: No peripheral edema   Skin: No rashes or open lesions/ulcers identified on skin.    Last Recorded Vitals  There were no vitals taken for this visit.    Relevant Results           Assessment/Plan   Una Gallegos is a 59 y.o. female presenting with chronic pain here for Lumbar interlaminar epidural steroid injection at the L4-L5 level; she denies any recent antibiotic use or infections, she denies any blood thinner use , and she denies contrast or local anesthetic allergies     Plan:  - We will proceed with the procedure as above. We discussed extensively the risks, benefits, and alternatives to the procedure. The patient's questions were addressed and answered in detail. The patient demonstrated understanding of  the procedure, and is amenable to proceeding with it. The Risks of the procedure that were discussed with the patient include but are not limited to the following: A lack of efficacy, transient worsening of pain, bleeding, infection, nerve injury, nerve damage, neuritis or sunburn sensation. Informed consent obtained as attached to EMR.  - Patient will follow-up with us in clinic.  - Patient to continue physician directed home exercises as tolerated.      Perry Kingsley MD  Chronic Pain Fellow  [facility]

## 2024-09-25 ENCOUNTER — SPECIALTY PHARMACY (OUTPATIENT)
Dept: NEUROLOGY | Facility: HOSPITAL | Age: 59
End: 2024-09-25
Payer: COMMERCIAL

## 2024-09-25 ENCOUNTER — HOSPITAL ENCOUNTER (OUTPATIENT)
Dept: OPERATING ROOM | Facility: CLINIC | Age: 59
Setting detail: OUTPATIENT SURGERY
Discharge: HOME | End: 2024-09-25
Payer: COMMERCIAL

## 2024-09-25 VITALS
OXYGEN SATURATION: 100 % | WEIGHT: 191.8 LBS | TEMPERATURE: 98.6 F | HEART RATE: 82 BPM | RESPIRATION RATE: 16 BRPM | DIASTOLIC BLOOD PRESSURE: 75 MMHG | BODY MASS INDEX: 30.1 KG/M2 | SYSTOLIC BLOOD PRESSURE: 147 MMHG | HEIGHT: 67 IN

## 2024-09-25 DIAGNOSIS — M54.16 LUMBAR RADICULOPATHY: ICD-10-CM

## 2024-09-25 PROCEDURE — 62323 NJX INTERLAMINAR LMBR/SAC: CPT | Performed by: PAIN MEDICINE

## 2024-09-25 PROCEDURE — 3600000006 HC OR TIME - EACH INCREMENTAL 1 MINUTE - PROCEDURE LEVEL ONE

## 2024-09-25 PROCEDURE — 7100000010 HC PHASE TWO TIME - EACH INCREMENTAL 1 MINUTE

## 2024-09-25 PROCEDURE — 2550000001 HC RX 255 CONTRASTS: Mod: SE | Performed by: PAIN MEDICINE

## 2024-09-25 PROCEDURE — 7100000009 HC PHASE TWO TIME - INITIAL BASE CHARGE

## 2024-09-25 PROCEDURE — 2500000005 HC RX 250 GENERAL PHARMACY W/O HCPCS: Mod: SE | Performed by: PAIN MEDICINE

## 2024-09-25 PROCEDURE — 3600000001 HC OR TIME - INITIAL BASE CHARGE - PROCEDURE LEVEL ONE

## 2024-09-25 RX ORDER — MIDAZOLAM HYDROCHLORIDE 1 MG/ML
2 INJECTION, SOLUTION INTRAMUSCULAR; INTRAVENOUS ONCE
Status: DISCONTINUED | OUTPATIENT
Start: 2024-09-25 | End: 2024-09-26 | Stop reason: HOSPADM

## 2024-09-25 RX ORDER — DEXAMETHASONE SODIUM PHOSPHATE 10 MG/ML
10 INJECTION INTRAMUSCULAR; INTRAVENOUS ONCE
Status: DISCONTINUED | OUTPATIENT
Start: 2024-09-25 | End: 2024-09-26 | Stop reason: HOSPADM

## 2024-09-25 RX ORDER — MIDAZOLAM HYDROCHLORIDE 1 MG/ML
2 INJECTION, SOLUTION INTRAMUSCULAR; INTRAVENOUS ONCE
Status: DISCONTINUED | OUTPATIENT
Start: 2024-09-26 | End: 2024-09-26 | Stop reason: HOSPADM

## 2024-09-25 RX ORDER — LIDOCAINE HYDROCHLORIDE 10 MG/ML
5 INJECTION, SOLUTION EPIDURAL; INFILTRATION; INTRACAUDAL; PERINEURAL ONCE
Status: DISCONTINUED | OUTPATIENT
Start: 2024-09-25 | End: 2024-09-26 | Stop reason: HOSPADM

## 2024-09-25 RX ORDER — LIDOCAINE HYDROCHLORIDE 10 MG/ML
INJECTION, SOLUTION INFILTRATION; PERINEURAL AS NEEDED
Status: COMPLETED | OUTPATIENT
Start: 2024-09-25 | End: 2024-09-25

## 2024-09-25 ASSESSMENT — PAIN - FUNCTIONAL ASSESSMENT
PAIN_FUNCTIONAL_ASSESSMENT: 0-10

## 2024-09-25 ASSESSMENT — PAIN SCALES - GENERAL
PAINLEVEL_OUTOF10: 0 - NO PAIN
PAINLEVEL_OUTOF10: 7
PAINLEVEL_OUTOF10: 0 - NO PAIN

## 2024-09-25 ASSESSMENT — COLUMBIA-SUICIDE SEVERITY RATING SCALE - C-SSRS
6. HAVE YOU EVER DONE ANYTHING, STARTED TO DO ANYTHING, OR PREPARED TO DO ANYTHING TO END YOUR LIFE?: NO
1. IN THE PAST MONTH, HAVE YOU WISHED YOU WERE DEAD OR WISHED YOU COULD GO TO SLEEP AND NOT WAKE UP?: NO
2. HAVE YOU ACTUALLY HAD ANY THOUGHTS OF KILLING YOURSELF?: NO

## 2024-09-25 NOTE — BRIEF OP NOTE
Date: 2024  OR Location: [facility]ASC NON-OR PROCEDURES    Name: Una Gallegos, : 1965, Age: 59 y.o., MRN: [id number], Sex: female    Diagnosis  Lumbar radiculopathy * No Diagnosis Codes entered *     Procedures  Lumbar epidural steroid injection    Surgeons   MD Maged    Resident/Fellow/Other Assistant:  Yocasta  Procedure Summary  Anesthesia: Anesthesia type not filed in the log.  ASA: 3  Anesthesia Staff: No anesthesia staff entered.  Estimated Blood Loss: 0mL  Intra-op Medications: * Intraprocedure medication information is unavailable because the case start and end events have not been set *      Intraprocedure I/O Totals       None           Specimen: No specimens collected     Staff:   Scrub Person: Gen          Findings:   Una Gallegos is a 59 y.o.  female  with lumbar radiculopathy here for lumbar epidural steroid injection.    Procedure performed: Lumbar interlaminar epidural steroid injection at L4-5 under fluoroscopic guidance     Performed by: Dr. Lynne  Assistant: Ivan Smith MD     The patient was identified in the preoperative holding area and marked according to protocol.  Informed consent was obtained and he was brought to the operating room on a gurney. A timeout was performed and consent was verified.  ASA Standard monitors were applied.  Patient was positioned prone on the operating room table and x-ray was used to identify the target area.  Skin was prepped in the usual fashion with ChloraPrep and draped using sterile towels.  The skin was anesthetized with 0.5% lidocaine with bicarbonate using a 27-gauge hypodermic needle.  A 20-gauge Touhy was used to contact the lamina and walked into the epidural space.  Epidural access was confirmed using contrast and confirmed on AP and lateral views. There was no evidence of intravascular or intrathecal contrast spread.  5 mL of 0.5% lidocaine and 10 mg of dexamethasone were injected in the epidural space.  The  needle was removed.   Hemostasis was ensured.  A bandage was applied.  The patient was brought to the recovery area in stable condition and there were no apparent complications.    All injected medications used were preservative-free and not .    Anesthesia: Local and conscious sedation with 2mg midazolam      MD Maged     Complications:  None; patient tolerated the procedure well.     Disposition: PACU - hemodynamically stable.  Condition: stable  Specimens Collected: No specimens collected  Attending Attestation: I performed the procedure.    MD Maged

## 2024-09-26 ASSESSMENT — PAIN SCALES - GENERAL: PAINLEVEL_OUTOF10: 0 - NO PAIN

## 2024-10-04 ENCOUNTER — SPECIALTY PHARMACY (OUTPATIENT)
Dept: PHARMACY | Facility: CLINIC | Age: 59
End: 2024-10-04

## 2024-10-04 PROCEDURE — RXMED WILLOW AMBULATORY MEDICATION CHARGE

## 2024-10-08 ENCOUNTER — PHARMACY VISIT (OUTPATIENT)
Dept: PHARMACY | Facility: CLINIC | Age: 59
End: 2024-10-08
Payer: MEDICAID

## 2024-10-10 ENCOUNTER — APPOINTMENT (OUTPATIENT)
Dept: PAIN MEDICINE | Facility: CLINIC | Age: 59
End: 2024-10-10
Payer: COMMERCIAL

## 2024-10-10 DIAGNOSIS — M51.9 LUMBAR DISC DISEASE: ICD-10-CM

## 2024-10-10 DIAGNOSIS — M54.12 CERVICAL RADICULITIS: Primary | ICD-10-CM

## 2024-10-10 PROCEDURE — 99214 OFFICE O/P EST MOD 30 MIN: CPT | Performed by: PAIN MEDICINE

## 2024-10-10 NOTE — PROGRESS NOTES
10/10/2024  Virtual interview    Ms. Gallegos had virtual interview today. She is a pleasant 59 y AA well known to my clinic. Multiple pain generators. Including cervical postlaminectomy and lumbar neuritis. Recently she had epidural injection , she reported good help but she gets spasms and her feet locked often. She is scheduled to have lumbar MRI in one week. She also reported about hand shaking with uncontrolled     Plan  Will wait for lumbar MRI to review any new findings  Will see pt in person once she gets the MRI completed  Was advised to review her shaking hand and tremors with PCP/neurologist      MD Akil Shaw MD   Physician  Specialty: Orthopaedic Surgery     Progress Notes     Signed     Encounter Date: 5/7/2024     Signed       Expand All Collapse All       Subjective  Patient ID: Una Gallegos is a 58 y.o. female.     Chief Complaint: No chief complaint on file.     Last Surgery: Left ankle removal of hardware  Last Surgery Date: 2/8/2024     HPI  Patient is a 58 y.o. female who is s/p left ankle removal of hardware. Date of surgery was 2/8/2024. Patient continues to be weight bearing at this time.  Patient reported that she has done very well.  Her wound is completely healed.  No drainage.  Her preoperative hardware pain is completely resolved.  Her only complaint today is diminished sensation about the sural nerve distribution on the lateral aspect of the foot.  She is very happy with her outcome.  ROS: All other systems have been reviewed and are negative except as previously noted in history of present illness.       IMP:  Problem List Items Addressed This Visit         Closed fracture of tibial plafond with fibula involvement with routine healing     Relevant Orders     XR ankle left 3+ views            Objective  General: Alert and oriented x 3, NAD, respirations easy and unlabored with no audible wheezes, skin warm and dry, speech and dress appropriate  for noted age, affect euthymic.      Musculoskeletal: Left Ankle  incisions c/d/i  No swelling at bimalleolar ankle  compartments soft  no calf tenderness  sensation intact to light touch  motor intact including TA/GS/EHL  palpable DP/PT pulses 2+   Good range of motion of the ankle.  No tenderness to palpation along the incision about the ankle.  X-ray: Images of left ankle reviewed personally by me today and reveals interval change of removed tibia and fibula hardware. Ankle mortise is intact.            Assessment/Plan  Encounter Diagnoses:  Closed fracture of tibial plafond with fibula involvement with routine healing, unspecified laterality, subsequent encounter     PLAN: Patient is s/p left ankle removal of hardware.  Patient has good function.  No pain.  We discussed the diminished sensation of the sural nerve distribution.  Will continue to observe.  He should improve over time.  At this point she will perform activities as tolerated with any restriction.  Given that she is doing well she will follow-up with me unless in the future.      Orders Placed This Encounter    XR ankle left 3+ views      No follow-ups on file.                  Electronically signed by Akil Collier MD at 5/7/2024 11:53 AM         Office Visit on 5/7/2024          Note shared with patient  Additional Documentation    Flowsheets: Interfaced Flowsheet Data   Encounter Info: Billing Info,     History,     Allergies     Orders Placed      XR ankle left 3+ views  Medication Changes      None  Medication List  Visit Diagnoses      Closed fracture of tibial plafond with fibula involvement with routine healing, unspecified laterality, subsequent encounter  Problem List

## 2024-10-13 ENCOUNTER — CLINICAL SUPPORT (OUTPATIENT)
Dept: EMERGENCY MEDICINE | Facility: HOSPITAL | Age: 59
End: 2024-10-13
Payer: COMMERCIAL

## 2024-10-13 ENCOUNTER — APPOINTMENT (OUTPATIENT)
Dept: RADIOLOGY | Facility: HOSPITAL | Age: 59
End: 2024-10-13
Payer: COMMERCIAL

## 2024-10-13 ENCOUNTER — HOSPITAL ENCOUNTER (INPATIENT)
Facility: HOSPITAL | Age: 59
LOS: 1 days | Discharge: HOME | End: 2024-10-15
Attending: EMERGENCY MEDICINE | Admitting: STUDENT IN AN ORGANIZED HEALTH CARE EDUCATION/TRAINING PROGRAM
Payer: COMMERCIAL

## 2024-10-13 DIAGNOSIS — J18.9 PNEUMONIA DUE TO ORGANISM: Primary | ICD-10-CM

## 2024-10-13 LAB
ALBUMIN SERPL BCP-MCNC: 4.1 G/DL (ref 3.4–5)
ALP SERPL-CCNC: 79 U/L (ref 33–110)
ALT SERPL W P-5'-P-CCNC: 15 U/L (ref 7–45)
ANION GAP SERPL CALC-SCNC: 14 MMOL/L (ref 10–20)
AST SERPL W P-5'-P-CCNC: 11 U/L (ref 9–39)
BASOPHILS # BLD AUTO: 0.03 X10*3/UL (ref 0–0.1)
BASOPHILS NFR BLD AUTO: 0.1 %
BILIRUB SERPL-MCNC: 0.3 MG/DL (ref 0–1.2)
BUN SERPL-MCNC: 26 MG/DL (ref 6–23)
CALCIUM SERPL-MCNC: 9.2 MG/DL (ref 8.6–10.6)
CHLORIDE SERPL-SCNC: 113 MMOL/L (ref 98–107)
CO2 SERPL-SCNC: 20 MMOL/L (ref 21–32)
CREAT SERPL-MCNC: 1.03 MG/DL (ref 0.5–1.05)
EGFRCR SERPLBLD CKD-EPI 2021: 63 ML/MIN/1.73M*2
EOSINOPHIL # BLD AUTO: 0.01 X10*3/UL (ref 0–0.7)
EOSINOPHIL NFR BLD AUTO: 0 %
ERYTHROCYTE [DISTWIDTH] IN BLOOD BY AUTOMATED COUNT: 15.2 % (ref 11.5–14.5)
GLUCOSE SERPL-MCNC: 139 MG/DL (ref 74–99)
HCT VFR BLD AUTO: 38.5 % (ref 36–46)
HGB BLD-MCNC: 10.9 G/DL (ref 12–16)
IMM GRANULOCYTES # BLD AUTO: 0.18 X10*3/UL (ref 0–0.7)
IMM GRANULOCYTES NFR BLD AUTO: 0.9 % (ref 0–0.9)
LYMPHOCYTES # BLD AUTO: 2.43 X10*3/UL (ref 1.2–4.8)
LYMPHOCYTES NFR BLD AUTO: 11.7 %
MAGNESIUM SERPL-MCNC: 2.53 MG/DL (ref 1.6–2.4)
MCH RBC QN AUTO: 26.7 PG (ref 26–34)
MCHC RBC AUTO-ENTMCNC: 28.3 G/DL (ref 32–36)
MCV RBC AUTO: 94 FL (ref 80–100)
MONOCYTES # BLD AUTO: 2.16 X10*3/UL (ref 0.1–1)
MONOCYTES NFR BLD AUTO: 10.4 %
NEUTROPHILS # BLD AUTO: 16.03 X10*3/UL (ref 1.2–7.7)
NEUTROPHILS NFR BLD AUTO: 76.9 %
NRBC BLD-RTO: 0 /100 WBCS (ref 0–0)
PLATELET # BLD AUTO: 232 X10*3/UL (ref 150–450)
POTASSIUM SERPL-SCNC: 3.8 MMOL/L (ref 3.5–5.3)
PROT SERPL-MCNC: 6.3 G/DL (ref 6.4–8.2)
RBC # BLD AUTO: 4.08 X10*6/UL (ref 4–5.2)
SODIUM SERPL-SCNC: 143 MMOL/L (ref 136–145)
WBC # BLD AUTO: 20.8 X10*3/UL (ref 4.4–11.3)

## 2024-10-13 PROCEDURE — 93005 ELECTROCARDIOGRAM TRACING: CPT

## 2024-10-13 PROCEDURE — 84484 ASSAY OF TROPONIN QUANT: CPT

## 2024-10-13 PROCEDURE — 83735 ASSAY OF MAGNESIUM: CPT

## 2024-10-13 PROCEDURE — 80053 COMPREHEN METABOLIC PANEL: CPT

## 2024-10-13 PROCEDURE — 71046 X-RAY EXAM CHEST 2 VIEWS: CPT

## 2024-10-13 PROCEDURE — 99285 EMERGENCY DEPT VISIT HI MDM: CPT | Performed by: EMERGENCY MEDICINE

## 2024-10-13 PROCEDURE — 85025 COMPLETE CBC W/AUTO DIFF WBC: CPT

## 2024-10-13 PROCEDURE — 99285 EMERGENCY DEPT VISIT HI MDM: CPT | Mod: 25

## 2024-10-13 PROCEDURE — 2500000004 HC RX 250 GENERAL PHARMACY W/ HCPCS (ALT 636 FOR OP/ED): Mod: SE

## 2024-10-13 PROCEDURE — 71046 X-RAY EXAM CHEST 2 VIEWS: CPT | Performed by: RADIOLOGY

## 2024-10-13 PROCEDURE — 96375 TX/PRO/DX INJ NEW DRUG ADDON: CPT

## 2024-10-13 PROCEDURE — 83036 HEMOGLOBIN GLYCOSYLATED A1C: CPT

## 2024-10-13 PROCEDURE — 93010 ELECTROCARDIOGRAM REPORT: CPT | Performed by: EMERGENCY MEDICINE

## 2024-10-13 PROCEDURE — 36415 COLL VENOUS BLD VENIPUNCTURE: CPT

## 2024-10-13 RX ORDER — METOCLOPRAMIDE HYDROCHLORIDE 5 MG/ML
10 INJECTION INTRAMUSCULAR; INTRAVENOUS ONCE
Status: COMPLETED | OUTPATIENT
Start: 2024-10-13 | End: 2024-10-13

## 2024-10-13 RX ORDER — KETOROLAC TROMETHAMINE 30 MG/ML
30 INJECTION, SOLUTION INTRAMUSCULAR; INTRAVENOUS ONCE
Status: COMPLETED | OUTPATIENT
Start: 2024-10-13 | End: 2024-10-13

## 2024-10-13 RX ORDER — DIPHENHYDRAMINE HYDROCHLORIDE 50 MG/ML
25 INJECTION INTRAMUSCULAR; INTRAVENOUS ONCE
Status: COMPLETED | OUTPATIENT
Start: 2024-10-13 | End: 2024-10-13

## 2024-10-13 ASSESSMENT — PAIN - FUNCTIONAL ASSESSMENT: PAIN_FUNCTIONAL_ASSESSMENT: 0-10

## 2024-10-13 ASSESSMENT — PAIN DESCRIPTION - FREQUENCY: FREQUENCY: CONSTANT/CONTINUOUS

## 2024-10-13 ASSESSMENT — PAIN SCALES - GENERAL: PAINLEVEL_OUTOF10: 10 - WORST POSSIBLE PAIN

## 2024-10-13 ASSESSMENT — PAIN DESCRIPTION - PROGRESSION: CLINICAL_PROGRESSION: NOT CHANGED

## 2024-10-13 ASSESSMENT — PAIN DESCRIPTION - DESCRIPTORS: DESCRIPTORS: ACHING;SHARP

## 2024-10-13 ASSESSMENT — PAIN DESCRIPTION - LOCATION: LOCATION: CHEST

## 2024-10-13 ASSESSMENT — PAIN DESCRIPTION - ORIENTATION: ORIENTATION: MID

## 2024-10-13 ASSESSMENT — PAIN DESCRIPTION - ONSET: ONSET: ONGOING

## 2024-10-13 ASSESSMENT — PAIN DESCRIPTION - PAIN TYPE: TYPE: ACUTE PAIN;CHRONIC PAIN

## 2024-10-13 NOTE — LETTER
October 15, 2024     Patient: Una Gallegos   YOB: 1965   Date of Visit: 10/13/2024 - 10/15/2024       To Whom It May Concern:    Una Gallegos was seen in the hospital from 10/13/2024 to 10/15/2024. Please excuse Una for her absence from work on these days to manage acute issues.    If you have any questions or concerns, please don't hesitate to call.         Sincerely,       Afua Shields MD

## 2024-10-14 ENCOUNTER — APPOINTMENT (OUTPATIENT)
Dept: RADIOLOGY | Facility: HOSPITAL | Age: 59
End: 2024-10-14
Payer: COMMERCIAL

## 2024-10-14 PROBLEM — J18.9 PNEUMONIA DUE TO ORGANISM: Status: ACTIVE | Noted: 2024-10-14

## 2024-10-14 LAB
ANION GAP BLDV CALCULATED.4IONS-SCNC: 9 MMOL/L (ref 10–25)
APPEARANCE UR: CLEAR
ATRIAL RATE: 88 BPM
BASE EXCESS BLDV CALC-SCNC: -3.8 MMOL/L (ref -2–3)
BILIRUB UR STRIP.AUTO-MCNC: NEGATIVE MG/DL
BODY TEMPERATURE: 37 DEGREES CELSIUS
CA-I BLDV-SCNC: 1.22 MMOL/L (ref 1.1–1.33)
CARDIAC TROPONIN I PNL SERPL HS: 6 NG/L (ref 0–34)
CARDIAC TROPONIN I PNL SERPL HS: 7 NG/L (ref 0–34)
CARDIAC TROPONIN I PNL SERPL HS: 9 NG/L (ref 0–34)
CHLORIDE BLDV-SCNC: 110 MMOL/L (ref 98–107)
COLOR UR: YELLOW
EST. AVERAGE GLUCOSE BLD GHB EST-MCNC: 131 MG/DL
FLUAV RNA RESP QL NAA+PROBE: NOT DETECTED
FLUBV RNA RESP QL NAA+PROBE: NOT DETECTED
GLUCOSE BLD MANUAL STRIP-MCNC: 109 MG/DL (ref 74–99)
GLUCOSE BLD MANUAL STRIP-MCNC: 137 MG/DL (ref 74–99)
GLUCOSE BLD MANUAL STRIP-MCNC: 143 MG/DL (ref 74–99)
GLUCOSE BLD MANUAL STRIP-MCNC: 163 MG/DL (ref 74–99)
GLUCOSE BLD MANUAL STRIP-MCNC: 96 MG/DL (ref 74–99)
GLUCOSE BLDV-MCNC: 163 MG/DL (ref 74–99)
GLUCOSE UR STRIP.AUTO-MCNC: NORMAL MG/DL
HBA1C MFR BLD: 6.2 %
HCO3 BLDV-SCNC: 21.5 MMOL/L (ref 22–26)
HCT VFR BLD EST: 32 % (ref 36–46)
HGB BLDV-MCNC: 10.5 G/DL (ref 12–16)
HOLD SPECIMEN: NORMAL
INHALED O2 CONCENTRATION: 21 %
KETONES UR STRIP.AUTO-MCNC: ABNORMAL MG/DL
LACTATE BLDV-SCNC: 1.1 MMOL/L (ref 0.4–2)
LEUKOCYTE ESTERASE UR QL STRIP.AUTO: NEGATIVE
LEVETIRACETAM SERPL-MCNC: <2 UG/ML (ref 10–40)
MRSA DNA SPEC QL NAA+PROBE: NOT DETECTED
MUCOUS THREADS #/AREA URNS AUTO: NORMAL /LPF
NITRITE UR QL STRIP.AUTO: NEGATIVE
OXYHGB MFR BLDV: 93.5 % (ref 45–75)
P AXIS: 69 DEGREES
P OFFSET: 203 MS
P ONSET: 147 MS
PCO2 BLDV: 39 MM HG (ref 41–51)
PH BLDV: 7.35 PH (ref 7.33–7.43)
PH UR STRIP.AUTO: 6 [PH]
PO2 BLDV: 67 MM HG (ref 35–45)
POTASSIUM BLDV-SCNC: 3.7 MMOL/L (ref 3.5–5.3)
PR INTERVAL: 142 MS
PROT UR STRIP.AUTO-MCNC: ABNORMAL MG/DL
Q ONSET: 218 MS
QRS COUNT: 14 BEATS
QRS DURATION: 94 MS
QT INTERVAL: 380 MS
QTC CALCULATION(BAZETT): 459 MS
QTC FREDERICIA: 431 MS
R AXIS: 41 DEGREES
RBC # UR STRIP.AUTO: NEGATIVE /UL
RBC #/AREA URNS AUTO: NORMAL /HPF
SAO2 % BLDV: 95 % (ref 45–75)
SARS-COV-2 RNA RESP QL NAA+PROBE: NOT DETECTED
SODIUM BLDV-SCNC: 137 MMOL/L (ref 136–145)
SP GR UR STRIP.AUTO: >1.05
SQUAMOUS #/AREA URNS AUTO: NORMAL /HPF
T AXIS: 61 DEGREES
T OFFSET: 408 MS
T4 FREE SERPL-MCNC: 0.78 NG/DL (ref 0.78–1.48)
TSH SERPL-ACNC: 0.36 MIU/L (ref 0.44–3.98)
UROBILINOGEN UR STRIP.AUTO-MCNC: ABNORMAL MG/DL
VENTRICULAR RATE: 88 BPM
WBC #/AREA URNS AUTO: NORMAL /HPF

## 2024-10-14 PROCEDURE — 70450 CT HEAD/BRAIN W/O DYE: CPT | Performed by: RADIOLOGY

## 2024-10-14 PROCEDURE — 96367 TX/PROPH/DG ADDL SEQ IV INF: CPT

## 2024-10-14 PROCEDURE — 84439 ASSAY OF FREE THYROXINE: CPT

## 2024-10-14 PROCEDURE — 84484 ASSAY OF TROPONIN QUANT: CPT

## 2024-10-14 PROCEDURE — 87636 SARSCOV2 & INF A&B AMP PRB: CPT

## 2024-10-14 PROCEDURE — 96366 THER/PROPH/DIAG IV INF ADDON: CPT

## 2024-10-14 PROCEDURE — 1200000002 HC GENERAL ROOM WITH TELEMETRY DAILY

## 2024-10-14 PROCEDURE — G0378 HOSPITAL OBSERVATION PER HR: HCPCS

## 2024-10-14 PROCEDURE — 2500000002 HC RX 250 W HCPCS SELF ADMINISTERED DRUGS (ALT 637 FOR MEDICARE OP, ALT 636 FOR OP/ED)

## 2024-10-14 PROCEDURE — 81003 URINALYSIS AUTO W/O SCOPE: CPT

## 2024-10-14 PROCEDURE — 96365 THER/PROPH/DIAG IV INF INIT: CPT

## 2024-10-14 PROCEDURE — 2500000004 HC RX 250 GENERAL PHARMACY W/ HCPCS (ALT 636 FOR OP/ED)

## 2024-10-14 PROCEDURE — 87040 BLOOD CULTURE FOR BACTERIA: CPT

## 2024-10-14 PROCEDURE — 82947 ASSAY GLUCOSE BLOOD QUANT: CPT

## 2024-10-14 PROCEDURE — 82330 ASSAY OF CALCIUM: CPT

## 2024-10-14 PROCEDURE — 70450 CT HEAD/BRAIN W/O DYE: CPT

## 2024-10-14 PROCEDURE — 2500000001 HC RX 250 WO HCPCS SELF ADMINISTERED DRUGS (ALT 637 FOR MEDICARE OP)

## 2024-10-14 PROCEDURE — 87641 MR-STAPH DNA AMP PROBE: CPT

## 2024-10-14 PROCEDURE — 71275 CT ANGIOGRAPHY CHEST: CPT

## 2024-10-14 PROCEDURE — 36415 COLL VENOUS BLD VENIPUNCTURE: CPT

## 2024-10-14 PROCEDURE — 84443 ASSAY THYROID STIM HORMONE: CPT

## 2024-10-14 PROCEDURE — 2500000005 HC RX 250 GENERAL PHARMACY W/O HCPCS: Mod: SE

## 2024-10-14 PROCEDURE — 87075 CULTR BACTERIA EXCEPT BLOOD: CPT | Mod: 59

## 2024-10-14 PROCEDURE — 80177 DRUG SCRN QUAN LEVETIRACETAM: CPT

## 2024-10-14 PROCEDURE — 2500000004 HC RX 250 GENERAL PHARMACY W/ HCPCS (ALT 636 FOR OP/ED): Mod: SE

## 2024-10-14 PROCEDURE — 2550000001 HC RX 255 CONTRASTS: Mod: SE | Performed by: STUDENT IN AN ORGANIZED HEALTH CARE EDUCATION/TRAINING PROGRAM

## 2024-10-14 PROCEDURE — 71275 CT ANGIOGRAPHY CHEST: CPT | Performed by: RADIOLOGY

## 2024-10-14 RX ORDER — VANCOMYCIN HYDROCHLORIDE 1 G/20ML
INJECTION, POWDER, LYOPHILIZED, FOR SOLUTION INTRAVENOUS DAILY PRN
Status: DISCONTINUED | OUTPATIENT
Start: 2024-10-14 | End: 2024-10-15

## 2024-10-14 RX ORDER — DIMETHYL FUMARATE 240 MG/1
240 CAPSULE ORAL 2 TIMES DAILY
Status: DISCONTINUED | OUTPATIENT
Start: 2024-10-14 | End: 2024-10-15 | Stop reason: HOSPADM

## 2024-10-14 RX ORDER — LEVETIRACETAM 500 MG/1
500 TABLET ORAL 2 TIMES DAILY
Status: DISCONTINUED | OUTPATIENT
Start: 2024-10-14 | End: 2024-10-15 | Stop reason: HOSPADM

## 2024-10-14 RX ORDER — ESCITALOPRAM OXALATE 10 MG/1
10 TABLET ORAL DAILY
Status: DISCONTINUED | OUTPATIENT
Start: 2024-10-14 | End: 2024-10-15 | Stop reason: HOSPADM

## 2024-10-14 RX ORDER — INSULIN LISPRO 100 [IU]/ML
0-10 INJECTION, SOLUTION INTRAVENOUS; SUBCUTANEOUS EVERY 4 HOURS
Status: DISCONTINUED | OUTPATIENT
Start: 2024-10-14 | End: 2024-10-15

## 2024-10-14 RX ORDER — SENNOSIDES 8.6 MG/1
2 TABLET ORAL 2 TIMES DAILY
Status: DISCONTINUED | OUTPATIENT
Start: 2024-10-14 | End: 2024-10-15 | Stop reason: HOSPADM

## 2024-10-14 RX ORDER — CEFTRIAXONE 1 G/50ML
1 INJECTION, SOLUTION INTRAVENOUS ONCE
Status: COMPLETED | OUTPATIENT
Start: 2024-10-14 | End: 2024-10-14

## 2024-10-14 RX ORDER — ENOXAPARIN SODIUM 100 MG/ML
40 INJECTION SUBCUTANEOUS EVERY 24 HOURS
Status: DISCONTINUED | OUTPATIENT
Start: 2024-10-14 | End: 2024-10-15 | Stop reason: HOSPADM

## 2024-10-14 RX ORDER — TOPIRAMATE 100 MG/1
100 TABLET, FILM COATED ORAL 2 TIMES DAILY
Status: DISCONTINUED | OUTPATIENT
Start: 2024-10-14 | End: 2024-10-15 | Stop reason: HOSPADM

## 2024-10-14 RX ORDER — ONDANSETRON 4 MG/1
4 TABLET, ORALLY DISINTEGRATING ORAL ONCE
Status: COMPLETED | OUTPATIENT
Start: 2024-10-14 | End: 2024-10-14

## 2024-10-14 RX ORDER — METOPROLOL SUCCINATE 50 MG/1
50 TABLET, EXTENDED RELEASE ORAL DAILY
Status: DISCONTINUED | OUTPATIENT
Start: 2024-10-14 | End: 2024-10-15 | Stop reason: HOSPADM

## 2024-10-14 RX ORDER — ALBUTEROL SULFATE 90 UG/1
2 INHALANT RESPIRATORY (INHALATION) EVERY 4 HOURS PRN
Status: DISCONTINUED | OUTPATIENT
Start: 2024-10-14 | End: 2024-10-15 | Stop reason: HOSPADM

## 2024-10-14 RX ORDER — DEXTROSE 50 % IN WATER (D50W) INTRAVENOUS SYRINGE
12.5
Status: DISCONTINUED | OUTPATIENT
Start: 2024-10-14 | End: 2024-10-15 | Stop reason: HOSPADM

## 2024-10-14 RX ORDER — DEXTROSE 50 % IN WATER (D50W) INTRAVENOUS SYRINGE
25
Status: DISCONTINUED | OUTPATIENT
Start: 2024-10-14 | End: 2024-10-15 | Stop reason: HOSPADM

## 2024-10-14 RX ORDER — ATORVASTATIN CALCIUM 20 MG/1
80 TABLET, FILM COATED ORAL NIGHTLY
Status: DISCONTINUED | OUTPATIENT
Start: 2024-10-14 | End: 2024-10-15 | Stop reason: HOSPADM

## 2024-10-14 RX ORDER — GABAPENTIN 400 MG/1
800 CAPSULE ORAL 3 TIMES DAILY
Status: DISCONTINUED | OUTPATIENT
Start: 2024-10-14 | End: 2024-10-15 | Stop reason: HOSPADM

## 2024-10-14 RX ORDER — FOLIC ACID 1 MG/1
1 TABLET ORAL DAILY
Status: DISCONTINUED | OUTPATIENT
Start: 2024-10-14 | End: 2024-10-15 | Stop reason: HOSPADM

## 2024-10-14 RX ORDER — VANCOMYCIN HYDROCHLORIDE 1 G/200ML
1000 INJECTION, SOLUTION INTRAVENOUS EVERY 12 HOURS
Status: DISCONTINUED | OUTPATIENT
Start: 2024-10-14 | End: 2024-10-15

## 2024-10-14 RX ORDER — FUROSEMIDE 10 MG/ML
10 SOLUTION ORAL DAILY
Status: DISCONTINUED | OUTPATIENT
Start: 2024-10-14 | End: 2024-10-14

## 2024-10-14 RX ORDER — HYDROXYCHLOROQUINE SULFATE 200 MG/1
200 TABLET, FILM COATED ORAL DAILY
Status: DISCONTINUED | OUTPATIENT
Start: 2024-10-14 | End: 2024-10-15 | Stop reason: HOSPADM

## 2024-10-14 ASSESSMENT — LIFESTYLE VARIABLES
TOTAL SCORE: 0
EVER FELT BAD OR GUILTY ABOUT YOUR DRINKING: NO
EVER HAD A DRINK FIRST THING IN THE MORNING TO STEADY YOUR NERVES TO GET RID OF A HANGOVER: NO
HAVE PEOPLE ANNOYED YOU BY CRITICIZING YOUR DRINKING: NO
HAVE YOU EVER FELT YOU SHOULD CUT DOWN ON YOUR DRINKING: NO

## 2024-10-14 NOTE — H&P
"HPI:  Una Gallegos is a 59 y.o. female with a PMHx of HTN, HLD, T2DM, Sjogren's, MS, lupus, epilepsy, and migraine headaches who presents with complaints of shortness of breath.  Patient stated that over the past couple days she had increased shortness of breath with exertion as well as a new cough productive of clear sputum.  Patient was using an inhaler from one of her family members which she said subjectively improved her symptoms. Her sister who was present with her stated she noticed that the patient had increased work of breathing as well as a \"clicking\" sound when she breathed which has since resolved. She also endorsed feeling subjective confusion and generalized malaise and weakness as well as 3 bouts of loose stools a day for the past couple days but denies any n/v or abdominal pain.  Patient also endorsed left-sided chest pain that has since resolved that wa aching and radiating to her left jaw.  She states she never felt anything similar in the past. Patient denied any recent vision changes, fevers, chills, or sick contacts. She has had no recent medication changes and is compliant with her meds, no recent seizures. Migraine present on admission has improved since receiving migraine cocktail.     Of note, pt admitted in 3/24 with similar complaint with left chest pain and acute hypoxic respiratory failure, found to have metapneumovirus.    ED course  Vitals:   /78 (BP Location: Right arm, Patient Position: Lying)   Pulse 75   Temp 36.6 °C (97.8 °F) (Oral)   Resp 14   Ht 1.702 m (5' 7\")   Wt 89.4 kg (197 lb)   SpO2 97%   BMI 30.85 kg/m²    - Labs:   CBC: WBC 20.8 Hgb 10.9  plt 232   BMP: Na 143, K 3.8 Cl 113 HCO3 20 BUN 26 Cr 1.03glu 139   LFT: Ca 9.2 tprot 6.3, alb 4.1 alkphos 79 AST11 ALT 15 tbili 0.3 dbili _   Electrolytes: PO4 _ Mg 2.53   pH 7.35/pCo2 39, lactate 1.1  Troponin 7, 9  - Imaging:   CT head wo IV contrast    Result Date: 10/14/2024  Interpreted By:  Romy Daugherty,  " Corazon Keane STUDY: CT HEAD WO IV CONTRAST;  10/14/2024 2:59 am   INDICATION: Signs/Symptoms:confusion, altered.     COMPARISON: CT head 01/13/2022, MRI brain 09/06/2023   ACCESSION NUMBER(S): [id number]   ORDERING CLINICIAN: AUDELIA ROLAND   TECHNIQUE: Noncontrast axial CT scan of head was performed. Angled reformats in brain and bone windows were generated. The images were reviewed in bone, brain, blood and soft tissue windows.   FINDINGS: CT HEAD:   CSF SPACES: The ventricles, sulci and basal cisterns are within normal limits. There is no extraaxial fluid collection.   PARENCHYMA: The grey-white differentiation is intact. There is no mass effect or midline shift.  There is no intracranial hemorrhage. Encephalomalacia within the right lentiform nucleus and left occipital lobe consistent with remote infarct. Additional mild nonspecific white matter changes.   CALVARIUM: The calvarium is unremarkable.   PARANASAL SINUSES AND MASTOIDS: Postsurgical changes of bilateral maxillary antrostomies and ethmoidectomies. Minimal mucosal thickening along the bilateral nasal passageways and sphenoid sinus. The mastoid air cells are well-aerated.       1. No acute intracranial hemorrhage or mass effect. 2. Nonspecific white matter changes and sequela of small remote ischemic infarcts, similar to prior imaging 3. Postsurgical changes of paranasal sinuses.     I personally reviewed the images/study and I agree with the findings as stated by Dr. Lakhwinder Ladd. This study was interpreted at [place], [place].   MACRO: None   Signed by: Romy Daugherty 10/14/2024 3:50 AM Dictation workstation:   [id number]    ECG 12 Lead    Result Date: 10/14/2024  Normal sinus rhythm Nonspecific ST abnormality Abnormal ECG When compared with ECG of 13-OCT-2024 21:32, T wave inversion no longer evident in Inferior leads See ED provider note for full interpretation and clinical correlation Confirmed by  Juanpablo Ferrari ([id number]) on 10/14/2024 3:39:14 AM    CT angio chest for pulmonary embolism    Result Date: 10/14/2024  Interpreted By:  Chelsie Geller, STUDY: CT ANGIO CHEST FOR PULMONARY EMBOLISM;  10/14/2024 12:18 am   INDICATION: Signs/Symptoms:chest pain, SOB.     COMPARISON: CTA chest 12/05/2022   ACCESSION NUMBER(S): [id number]   ORDERING CLINICIAN: CHELSIE BOSS   TECHNIQUE: Contiguous axial images of the chest were obtained after the intravenous administration of 90 mL Omnipaque 350 contrast using angiographic PE protocol. Coronal and sagittal reformatted images were reconstructed from the axial data. MIP images were created on an independent workstation and reviewed.   FINDINGS: PULMONARY ARTERIES: Adequate opacification to the level of the segmental arteries. The subsegmental arteries are suboptimally assessed due to mixing artifact and motion. No filling defect to suggest pulmonary embolus in the visualized pulmonary arteries. The main pulmonary artery is normal in diameter. No CT evidence of right heart strain.   HEART: Normal in size. No significant coronary artery calcifications. No significant pericardial effusion.   VESSELS: Normal caliber aorta without dissection. No significant aortic atherosclerosis.   MEDIASTINUM AND LYMPH NODES: Visualized thyroid is within normal limits. No enlarged intrathoracic or axillary lymph nodes by imaging criteria. No pneumomediastinum. The esophagus appears within normal limits.   LUNG, AIRWAYS, AND PLEURA: Trachea and proximal mainstem bronchi are patent. Bandlike opacities throughout the dependent portions of the lingula and lung bases likely reflecting atelectasis or scarring. No focal consolidation, pleural effusion, or pneumothorax.   OSSEOUS STRUCTURES: No acute osseous abnormality.   CHEST WALL SOFT TISSUES: No discernible abnormality.   UPPER ABDOMEN/OTHER: No acute abnormality.       1. No pulmonary embolism identified to the level of the segmental arteries.  Small subsegmental filling defects can not be excluded. 2. Bandlike opacities through the dependent portions of the lower lobes and lingula likely reflecting atelectasis or scarring.   MACRO: None   Signed by: Malcolm Geller 10/14/2024 1:04 AM Dictation workstation:   [id number]    XR chest 2 views    Result Date: 10/14/2024  Interpreted By:  Malcolm Geller,  and Raven Torres STUDY: XR CHEST 2 VIEWS;  10/13/2024 11:18 pm   INDICATION: Signs/Symptoms:SOB.   COMPARISON: Chest radiograph: 12/05/2022. CT abdomen and pelvis: 01/02/2024   ACCESSION NUMBER(S): [id number]   ORDERING CLINICIAN: ESTELA GARCIA   FINDINGS: PA and lateral radiographs of the chest were provided.     CARDIOMEDIASTINAL SILHOUETTE: Cardiomediastinal silhouette is normal in size and configuration.   LUNGS: Low lung volumes with associated bronchovascular crowding. Bibasilar atelectasis with no focal consolidations, pleural effusions or pneumothorax.   ABDOMEN: There is a prominent gastric bubble. Otherwise no acute remarkable upper abdominal findings.   BONES: No acute osseous changes.       1.  No evidence of acute cardiopulmonary process.     I personally reviewed the images/study and I agree with the findings as stated by Resident Brian Carbajal MD.   MACRO: NONE.   Signed by: Malcolm Geller 10/14/2024 12:52 AM Dictation workstation:   [id number]   - Interventions Benadryl, azithro, ceftriaxone, toradol, reglan, zofran.    Past medical history:  Past Medical History:   Diagnosis Date    Asthma ([facility])     Chronic headaches     Migraines    COVID-19     COVID + 5/2023    Depression     Diabetes mellitus (Multi)     10/23/23 A1C 6.0    Encounter for electrocardiogram 01/2024    EKG on 1/2/24    Fibromyalgia, primary     Fractures     Painful orthopaedic Closed fracture of left ankle with routine healing,    Gastritis     History of blood transfusion     Several years ago    Hyperlipidemia     Hypertension     Joint pain     Lupus (Multi)      MS (multiple sclerosis) (Multi)     F/W Dr. Silvia Molina LOV 2023    Painful orthopaedic hardware ([facility])     Ortho: Akil Collier    Personal history of other diseases of the musculoskeletal system and connective tissue     Personal history of fibromyalgia    Seizure disorder (Multi)     Last occurence in     Stroke (Multi)     CVA in        Surgical history:  Past Surgical History:   Procedure Laterality Date    APPENDECTOMY  2014    Appendectomy    CERVICAL FUSION       SECTION, CLASSIC       Section x2    COLONOSCOPY      CT ANGIO NECK  12/10/2019    CT NEC[place] ANGIO W AND WO IV [place] [facility] [place]    [place] ANGIO NECK  2022    CT NECK ANGIO W AND WO [place] CONTRAST [place] [facility] [place]    CT HEAD ANGIO W AND WO IV CONTRAST  12/10/2019    CT HEAD ANGIO W AND WO IV CONTRAST [place] [facility] [place]    CT HEAD ANGIO W AND WO IV CONTRAST  2022    CT HEAD ANGIO W AND WO IV CONTRAST [place] [facility] [place]    HYSTERECTOMY  10/05/2015    Hysterectomy    MR HEAD ANGIO WO IV CONTRAST  2020    MR HEAD ANGIO WO IV CONTRAST [place] [facility] [place]    MR HEAD ANGIO WO IV CONTRAST  2021    MR HEAD ANGIO WO IV CONTRAST 2021 [facility] [place]    MR NECK ANGIO WO IV CONTRAST  2020    MR NECK ANGIO WO IV CONTRAST 2020 [facility] [place]    MR NECK ANGIO WO IV CONTRAST  2021    MR NECK ANGIO WO IV CONTRAST 2021 [facility] [place]    ORIF TIBIA FRACTURE         Medications prior to admission:  Current Outpatient Medications   Medication Instructions    acetaminophen (TYLENOL) 500 mg, oral, Every 6 hours PRN    albuterol 90 mcg/actuation inhaler inhalation    ascorbic acid (VITAMIN C) 250 mg, oral, Daily    atorvastatin (LIPITOR) 80 mg, oral, Nightly    azelastine (Astelin) 137 mcg (0.1 %) nasal spray 2 sprays, nasal, 2 times daily    dicyclomine (BENTYL) 20 mg, oral, 4 times daily PRN     dimethyl fumarate (Tecfidera) 240 mg capsule,delayed release(DR/EC) capsule TAKE ONE (1) CAPSULE BY MOUTH TWICE DAILY. SWALLOW WHOLE. DO NOT OPEN CAPSULE. DO NOT CRUSH OR CHEW.    docusate sodium (COLACE) 100 mg, oral, 2 times daily    escitalopram (LEXAPRO) 10 mg, oral, Daily    folic acid (FOLVITE) 1 mg, oral, Daily, 5 capsules    furosemide (LASIX) 10 mg, oral, Daily    gabapentin (NEURONTIN) 800 mg, oral, 3 times daily    hydroxychloroquine (PLAQUENIL) 200 mg, oral, Daily    ibuprofen 800 mg, oral, Every 8 hours PRN    levETIRAcetam (KEPPRA) 500 mg, oral, 2 times daily    metFORMIN (GLUCOPHAGE) 500 mg, oral, Daily with breakfast    metoprolol succinate XL (TOPROL-XL) 50 mg, oral, Daily, Do not crush or chew.    mv-min/folic/vit K/lycop/coQ10 (DAILY MULTIVITAMIN ORAL) 1 tablet, oral, Daily    nitroglycerin (NITROSTAT) 0.4 mg, sublingual, Every 5 minutes for up to 3 doses as needed for chest pain. Call 911 if pain persists.    ondansetron ODT (ZOFRAN-ODT) 4 mg, oral, Every 8 hours PRN    rimegepant (NURTEC) 75 mg tablet,disintegrating Dissolve 1 tablet (75 mg) by mouth on or under the tongue once daily as needed at the start of a migraine. Max 1 tablet per 24 hours.    sennosides (Senokot) 8.6 mg tablet 1 tablet, oral, As needed    topiramate (TOPAMAX) 100 mg, oral, 2 times daily       Allergies:  No Known Allergies    Family history:  Family History   Problem Relation Name Age of Onset    Hypertension Mother      Cancer Father      Eating disorder Sister      Other (hoarding behavior) Sister      Lupus Mother's Sister          Systemic    Rheum arthritis Other Aunt        Social history:   reports that she has been smoking cigarettes. She has never used smokeless tobacco. She reports that she does not drink alcohol and does not use drugs.      Review of systems:  12 point ROS otherwise negative      Vitals:    24 Hour Vitals  Temperature:  [36.4 °C (97.5 °F)-36.7 °C (98.1 °F)] 36.6 °C (97.8 °F)  Heart Rate:   [75-98] 75  Respirations:  [14-19] 14  BP: (125-144)/(77-79) 144/78    Temp (24hrs), Av.6 °C (97.8 °F), Min:36.4 °C (97.5 °F), Max:36.7 °C (98.1 °F)     24 hour Intake/Output  No intake or output data in the 24 hours ending 10/14/24 0545     Physical exam:  Constitutional: Well-developed female in no acute distress.  HEENT: NCAT. EOMI.   Respiratory: CTAB. No wheezes, crackles, or rhonchi. Normal respiratory effort on RA.  Cardiovascular: RRR, normal S1/S2, No murmurs, rubs, or gallops.   Abdominal: Soft, nondistended, nontender to palpation. No rebound or guarding  Neuro: CN II-XII grossly intact. Moving all extremities with no focal deficits. A&Ox2 to person and place, said month was November, unknown date.   MSK: WWP, no peripheral edema  Skin: No lesions, wounds, or bruising  Psych: Sleepy but arousable to voice, follows commands.       Medications   Scheduled Medications  azithromycin, 500 mg, intravenous, Once     Continuous Medications    PRN Medications         Labs  CBC  Results from last 72 hours   Lab Units 10/13/24  2247   WBC AUTO x10*3/uL 20.8*   HEMOGLOBIN g/dL 10.9*   HEMATOCRIT % 38.5   PLATELETS AUTO x10*3/uL 232        BMP  Results from last 72 hours   Lab Units 10/13/24  2247   SODIUM mmol/L 143   POTASSIUM mmol/L 3.8   CHLORIDE mmol/L 113*   BUN mg/dL 26*   CREATININE mg/dL 1.03   MAGNESIUM mg/dL 2.53*       Imaging:     === 10/13/24 ===    CT HEAD WO IV CONTRAST    - Impression -  1. No acute intracranial hemorrhage or mass effect.  2. Nonspecific white matter changes and sequela of small remote  ischemic infarcts, similar to prior imaging  3. Postsurgical changes of paranasal sinuses.      I personally reviewed the images/study and I agree with the findings  as stated by Dr. Lakhwinder Ladd. This study was interpreted at [facility], [place], [place].    MACRO:  None    Signed by: Romy Daugherty 10/14/2024 3:50 AM  Dictation workstation:   [id number]             Assessment and plan:  Una Gallegos is a 59 y.o. female with a PMHx of HTN, HLD, T2DM, Sjogren's, MS, lupus, epilepsy, and migraine headaches who presents with complaints of shortness of breath and confusion. Admission labs s/f leukocytosis to 20.8, bicarb 20, UA noninfectious. Imaging s/f CTH with no abnormality and CTPE neg for PE and bandlike opacities in dependent portions of lower lobes likely atelectasis or scarring. Lung finding similar to visualized lungs on CT Thoracic spine from 2/24. Lower suspicion for ongoing pneumonia given stable CT findings and satting well on RA but given presenting dyspnea and leukocytosis, cannot be excluded. Will send encephalopathy workup and continue with broad spectrum antibiotic coverage.     #Leukocytosis  #Subjective Dyspnea  #Acute encephalopathy  #Atypical chest pain  ::UA non-infectious, CT chest without pneumonia.   ::EKG without ischemic changes, trop 7, 9  ::Flu/covid neg  Plan:  -Follow up TSH with T4 reflex  -Hold home gabapentin  -Keppra level  -Received azithromycin/ctx for cap coverage in ED. Switch to zosyn for more broad-spectrum coverage. Azithromycin dc given risk for QT prolongation with other meds.   -Blood cultures ordered  -Continue prn albuterol  -PT/OT eval for weakness      #MS   ::Follows with neurology  Plan:  -Held home gabapentin iso altered mentation  -Ordered home Tecfidera (non-formulary), will likely need to bring in from home    #Sjogrens  #SLE  -Continue home plaquenil    #Seizures  #Migaine  -Keppra level  -Continue on keppra/topiramate  -Home nurtec not ordered (nonformulary)    #T2DM  ::On metformin at home  ::HbA1c 6.2  -SSI    #HLD  #HTN  -Continue home metoprolol  -Continue home atorvastatin    #MDD  -Continue home lexapro       F: PRN  E: PRN  N: NPO given mental status  GI: None indicated  DVT prophylaxis: SubQ lovenox  Code status: Full (CONFIRMED ON ADMISSION)  Surrogate decision maker: Sister Pippa Gallegos  [phone]    Ashli Riggins MD  PGY-2 Internal Medicine

## 2024-10-14 NOTE — CONSULTS
Vancomycin Dosing by Pharmacy- INITIAL    Una Gallegos is a 59 y.o. year old female who Pharmacy has been consulted for vancomycin dosing for pneumonia. Based on the patient's indication and renal status this patient will be dosed based on a goal AUC of 400-600.     Renal function is currently stable.    Visit Vitals  /78 (BP Location: Right arm, Patient Position: Lying)   Pulse 75   Temp 36.6 °C (97.8 °F) (Oral)   Resp 14        Lab Results   Component Value Date    CREATININE 1.03 10/13/2024    CREATININE 0.70 2024    CREATININE 0.89 2023    CREATININE 0.94 2023        Patient weight is as follows:   Vitals:    10/13/24 2125   Weight: 89.4 kg (197 lb)       Cultures:  No results found for the encounter in last 14 days.        No intake/output data recorded.  I/O during current shift:  I/O this shift:  In: 50 [IV Piggyback:50]  Out: -     Temp (24hrs), Av.6 °C (97.8 °F), Min:36.4 °C (97.5 °F), Max:36.7 °C (98.1 °F)         Assessment/Plan     Patient will not be given a loading dose.  Will initiate vancomycin maintenance,  1000 mg every 12 hours.    This dosing regimen is predicted by [employer] to result in the following pharmacokinetic parameters:  Loading dose: N/A  Regimen: 1000 mg IV every 12 hours.  Start time: 06:49 on 10/14/2024  Exposure target: AUC24 (range)400-600 mg/L.hr   SVO64-44: 420 mg/L.hr  AUC24,ss: 560 mg/L.hr  Probability of AUC24 > 400: 84 %  Ctrough,ss: 18.7 mg/L  Probability of Ctrough,ss > 20: 43 %      Follow-up level will be ordered on 10/15/24 at 1st AM labs unless clinically indicated sooner.  Will continue to monitor renal function daily while on vancomycin and order serum creatinine at least every 48 hours if not already ordered.  Follow for continued vancomycin needs, clinical response, and signs/symptoms of toxicity.       Louie Tena, PharmD

## 2024-10-14 NOTE — ED PROVIDER NOTES
History of Present Illness     History provided by: Patient  Limitations to History: None  External Records Reviewed with Brief Summary: ED note    HPI:  Una Gallegos is a 59 y.o. female with PMH of Sjogren's, MS, lupus, epilepsy, and migraine  that presents to the emergency department for shortness of breath since 2 PM.  Patient states that the  shortness of breath has been constant.  Patient took albuterol at home with no relief however patient not prescribed albuterol but family members medication.  Patient also complains of some chest pain located on the left side feels like pressure, does not radiate however is complaining of some pain in the left jaw and bilateral neck pain that radiates down to the lumbar spine.  She states that when chest pain started having some diaphoresis however is not having diaphoresis now.  Patient does state that she has some residual left-sided upper and lower extremity weakness at baseline.  Patient states that she does have a headache that started about 2 hours ago, feels similar to her migraines, some photophobia.  Patient has a history of cervical postlaminectomy and lumbar neuritis and is seeing orthosurgery for epidural injections to help with the chronic pain.    Physical Exam   Triage vitals:  T 36.4 °C (97.5 °F)  HR 98  /79  RR 18  O2 99 % None (Room air)    General: Awake, alert, in no acute distress  Eyes: Gaze conjugate.  No scleral icterus or injection  HENT: Normo-cephalic, atraumatic. No stridor  CV: Regular rate, regular rhythm. Radial pulses 2+ bilaterally  Resp: Breathing non-labored, speaking in full sentences.  Clear to auscultation bilaterally  GI: Soft, non-distended, non-tender. No rebound or guarding.  MSK/Extremities: No gross bony deformities. Moving all extremities  Skin: Warm. Appropriate color  Neuro: Alert. Oriented. Face symmetric. Speech is fluent.  Gross strength and sensation intact in b/l UE and LEs  Psych: Appropriate mood and  affect    Medical Decision Making & ED Course   Medical Decision Making:  Una Gallegos is a 59 y.o. female with PMH of Sjogren's, MS, lupus, epilepsy, and migraine.  Differential diagnosis includes pneumonia, meningitis, COVID, influenza, MI, urinary tract infection, hypothyroidism, pulmonary embolism.  EKG, CBC, CMP, magnesium, troponin, UA, COVID, influenza, TSH has been ordered.  Updates can be seen in the ED course.  ----  Scoring Tools Utilized:      Social Determinants of Health which Significantly Impact Care: None identified The following actions were taken to address these social determinants: None    EKG Independent Interpretation: EKG interpreted by myself. Please see ED Course for full interpretation.    Independent Result Review and Interpretation: Relevant laboratory and radiographic results were reviewed and independently interpreted by myself.  As necessary, they are commented on in the ED Course.    Chronic conditions affecting the patient's care: As documented above in [facility]    The patient was discussed with the following consultants/services: None    Care Considerations: As documented above in [facility]    ED Course:  ED Course as of 10/15/24 2110   Sun Oct 13, 2024   2341 Initial EKG shows no acute normal sinus rhythm, left axis deviation, heart rate of 95, , QRS of 80, QTc of 432, some LVH however no ST elevations, no ST depressions, no T wave inversions however there is a lot of artifact I can see especially in lateral leads.  Second EKG shows a normal sinus rhythm, left axis deviation, LVH, heart rate of 95, LA of 138, QRS of 78, QTc of 434, no ST elevations no ST depressions, no T wave inversions. [YG]   Mon Oct 14, 2024   0052 On reevaluation, patient is continue to have headache 7 out of 10.  Patient is now alert and oriented x 1, does not know the year or month.  Patient does state that she actually has been having fevers at night, cold sweats, diarrhea.  When talking to her sister,  patient is not her normal self and is altered.  Patient does not appear to be at baseline, is uncomfortable, needs to be aroused for evaluation. [YG]   0056 Initial troponin is negative.  CMP shows a hyperglycemia of 139, hyperchloremia, elevated BUN.  Elevated magnesium of 2.53.  CBC shows a elevated white blood cell count of 20.8 with a leftward neutrophil shift of 16.  Hemoglobin of 10.9 which indicates a normocytic anemia.  However similar to baseline. [YG]   0156 Ct angio for PE shows 1. No pulmonary embolism identified to the level of the segmental  arteries. Small subsegmental filling defects can not be excluded. 2. Bandlike opacities through the dependent portions of the lower lobes and lingula likely reflecting atelectasis or scarring. [YG]   0157 Chest Xray shows 1.  No evidence of acute cardiopulmonary process. [YG]   0157 Second Troponin was negative. [YG]   0321 CT head showed 1. No acute intracranial abnormality. 2. Remote infarct within the right lentiform nucleus. 3. Postsurgical changes of bilateral maxillary antrostomies and  ethmoidectomies. Minimal mucosal thickening of the sinuses without residual sinus disease.   [YG]   0436 Coronavirus 2019, PCR: Not Detected [YG]   0436 Flu A Result: Not Detected [YG]   0436 Flu B Result: Not Detected [YG]   0436 Patient is now alert and oriented x 3, is not complaining of any shortness of breath states that she wants to go home.  However at this time we still have no reason or cause for the altered mental status. [YG]   Tue Oct 15, 2024   2106 There is less concern for meningitis as patient is not having any neck stiffness, does not appear to be acutely encephalopathic, no fevers here in the emergency room.  Patient is altered however at this time we believe that patient may be altered due to the pneumonia likely seen on CT imaging.  Patient was given ceftriaxone and azithromycin IV.  Patient recommended to be evaluated in the hospital due to the altered  mental status in the setting of pneumonia.  Patient is agreeable to the plan.  Patient vitally stable prior to being admitted to the general medicine service. [YG]      ED Course User Index  [YG] Gracia Reyes MD         Diagnoses as of 10/15/24 2110   Pneumonia due to organism     Disposition   As a result of their workup, the patient will require admission to the hospital.  The patient was informed of her diagnosis.  The patient was given the opportunity to ask questions and I answered them. The patient agreed to be admitted to the hospital.    Procedures   Procedures    Patient seen and discussed with ED attending physician.    Gracia Reyes MD  Emergency Medicine     Gracia Reyes MD  Resident  10/15/24 2110

## 2024-10-14 NOTE — ED TRIAGE NOTES
SOB since early this afternoon. Pt tried albuterol at home with no relief. Pt is having chest, neck, and upper back pain. Pt has a hx of MS and Lupus.

## 2024-10-15 VITALS
RESPIRATION RATE: 16 BRPM | TEMPERATURE: 98.6 F | SYSTOLIC BLOOD PRESSURE: 128 MMHG | OXYGEN SATURATION: 98 % | HEIGHT: 67 IN | WEIGHT: 197 LBS | DIASTOLIC BLOOD PRESSURE: 78 MMHG | BODY MASS INDEX: 30.92 KG/M2 | HEART RATE: 71 BPM

## 2024-10-15 PROBLEM — J18.9 PNEUMONIA DUE TO ORGANISM: Status: RESOLVED | Noted: 2024-10-14 | Resolved: 2024-10-15

## 2024-10-15 LAB
ERYTHROCYTE [DISTWIDTH] IN BLOOD BY AUTOMATED COUNT: 14.8 % (ref 11.5–14.5)
GLUCOSE BLD MANUAL STRIP-MCNC: 107 MG/DL (ref 74–99)
GLUCOSE BLD MANUAL STRIP-MCNC: 110 MG/DL (ref 74–99)
GLUCOSE BLD MANUAL STRIP-MCNC: 88 MG/DL (ref 74–99)
HCT VFR BLD AUTO: 36.7 % (ref 36–46)
HGB BLD-MCNC: 10.9 G/DL (ref 12–16)
MCH RBC QN AUTO: 26.5 PG (ref 26–34)
MCHC RBC AUTO-ENTMCNC: 29.7 G/DL (ref 32–36)
MCV RBC AUTO: 89 FL (ref 80–100)
NRBC BLD-RTO: 0 /100 WBCS (ref 0–0)
PLATELET # BLD AUTO: 230 X10*3/UL (ref 150–450)
RBC # BLD AUTO: 4.11 X10*6/UL (ref 4–5.2)
WBC # BLD AUTO: 8.9 X10*3/UL (ref 4.4–11.3)

## 2024-10-15 PROCEDURE — 97161 PT EVAL LOW COMPLEX 20 MIN: CPT | Mod: GP

## 2024-10-15 PROCEDURE — 85027 COMPLETE CBC AUTOMATED: CPT | Performed by: STUDENT IN AN ORGANIZED HEALTH CARE EDUCATION/TRAINING PROGRAM

## 2024-10-15 PROCEDURE — 2500000004 HC RX 250 GENERAL PHARMACY W/ HCPCS (ALT 636 FOR OP/ED)

## 2024-10-15 PROCEDURE — 99239 HOSP IP/OBS DSCHRG MGMT >30: CPT | Performed by: STUDENT IN AN ORGANIZED HEALTH CARE EDUCATION/TRAINING PROGRAM

## 2024-10-15 PROCEDURE — 2500000002 HC RX 250 W HCPCS SELF ADMINISTERED DRUGS (ALT 637 FOR MEDICARE OP, ALT 636 FOR OP/ED)

## 2024-10-15 PROCEDURE — 2500000001 HC RX 250 WO HCPCS SELF ADMINISTERED DRUGS (ALT 637 FOR MEDICARE OP)

## 2024-10-15 PROCEDURE — 96376 TX/PRO/DX INJ SAME DRUG ADON: CPT

## 2024-10-15 PROCEDURE — 2500000001 HC RX 250 WO HCPCS SELF ADMINISTERED DRUGS (ALT 637 FOR MEDICARE OP): Performed by: STUDENT IN AN ORGANIZED HEALTH CARE EDUCATION/TRAINING PROGRAM

## 2024-10-15 PROCEDURE — 2500000004 HC RX 250 GENERAL PHARMACY W/ HCPCS (ALT 636 FOR OP/ED): Performed by: STUDENT IN AN ORGANIZED HEALTH CARE EDUCATION/TRAINING PROGRAM

## 2024-10-15 PROCEDURE — 96366 THER/PROPH/DIAG IV INF ADDON: CPT

## 2024-10-15 PROCEDURE — 82947 ASSAY GLUCOSE BLOOD QUANT: CPT

## 2024-10-15 PROCEDURE — 36415 COLL VENOUS BLD VENIPUNCTURE: CPT | Performed by: STUDENT IN AN ORGANIZED HEALTH CARE EDUCATION/TRAINING PROGRAM

## 2024-10-15 PROCEDURE — G0378 HOSPITAL OBSERVATION PER HR: HCPCS

## 2024-10-15 RX ORDER — AMOXICILLIN AND CLAVULANATE POTASSIUM 875; 125 MG/1; MG/1
1 TABLET, FILM COATED ORAL EVERY 12 HOURS SCHEDULED
Status: DISCONTINUED | OUTPATIENT
Start: 2024-10-15 | End: 2024-10-15 | Stop reason: HOSPADM

## 2024-10-15 RX ORDER — AMOXICILLIN AND CLAVULANATE POTASSIUM 875; 125 MG/1; MG/1
1 TABLET, FILM COATED ORAL EVERY 12 HOURS SCHEDULED
Qty: 7 TABLET | Refills: 0 | Status: SHIPPED | OUTPATIENT
Start: 2024-10-15 | End: 2024-10-19

## 2024-10-15 RX ORDER — CEFTRIAXONE 1 G/50ML
1 INJECTION, SOLUTION INTRAVENOUS EVERY 24 HOURS
Status: DISCONTINUED | OUTPATIENT
Start: 2024-10-15 | End: 2024-10-15

## 2024-10-15 RX ORDER — METOCLOPRAMIDE HYDROCHLORIDE 5 MG/ML
10 INJECTION INTRAMUSCULAR; INTRAVENOUS ONCE
Status: COMPLETED | OUTPATIENT
Start: 2024-10-15 | End: 2024-10-15

## 2024-10-15 RX ORDER — KETOROLAC TROMETHAMINE 30 MG/ML
30 INJECTION, SOLUTION INTRAMUSCULAR; INTRAVENOUS ONCE
Status: COMPLETED | OUTPATIENT
Start: 2024-10-15 | End: 2024-10-15

## 2024-10-15 ASSESSMENT — PAIN SCALES - GENERAL
PAINLEVEL_OUTOF10: 8
PAINLEVEL_OUTOF10: 5 - MODERATE PAIN
PAINLEVEL_OUTOF10: 8
PAINLEVEL_OUTOF10: 8

## 2024-10-15 ASSESSMENT — COGNITIVE AND FUNCTIONAL STATUS - GENERAL
TURNING FROM BACK TO SIDE WHILE IN FLAT BAD: A LITTLE
CLIMB 3 TO 5 STEPS WITH RAILING: A LITTLE
WALKING IN HOSPITAL ROOM: A LITTLE
STANDING UP FROM CHAIR USING ARMS: A LITTLE
MOVING FROM LYING ON BACK TO SITTING ON SIDE OF FLAT BED WITH BEDRAILS: A LITTLE
MOBILITY SCORE: 18
MOVING TO AND FROM BED TO CHAIR: A LITTLE

## 2024-10-15 ASSESSMENT — PAIN DESCRIPTION - DESCRIPTORS
DESCRIPTORS: ACHING
DESCRIPTORS: ACHING;POUNDING;PRESSURE

## 2024-10-15 ASSESSMENT — ACTIVITIES OF DAILY LIVING (ADL): ADL_ASSISTANCE: INDEPENDENT

## 2024-10-15 ASSESSMENT — PAIN - FUNCTIONAL ASSESSMENT: PAIN_FUNCTIONAL_ASSESSMENT: 0-10

## 2024-10-15 NOTE — DISCHARGE SUMMARY
Discharge Diagnosis  Community acquired pneumonia  Sjogren's disease  Multiple sclerosis  Migraines     Issues Requiring Follow-Up  [ ] Please ensure patient's symptoms completely resolve.    Test Results Pending At Discharge  Pending Labs       Order Current Status    Blood Culture Preliminary result    Blood Culture Preliminary result            Hospital Course  Ms. Gallegos is a 60y/o lady with history of HTN, T2DM, Sjogren's, MS, and migraines who presented with shortness of breath. Patient stated that over the past couple days she had increased shortness of breath with exertion as well as a new cough productive of clear sputum. In the ED, the patient was afebrile and hemodynamically stable. Labs were notable for WBC 20.8 w/ 76.9% neutrophils, bicarb 20 w/ AG 14, Cr 1.03, and lactate 1.1. Urinalysis was not suggestive of infection. CTPE was negative for PE, but showed bandlike opacities through the dependent portions of the lower lobes and lingula. Patient was started on ceftriaxone and azithromycin for pneumonia coverage with improvement in her symptoms. Patient also complained of her typical migraine symptoms which was resolved with IV Ketoralac and IV metoclopramide. The patient was ultimately discharged home with a total 5 day course of PO augmentin (avoided levofloxacin due to QTc prolonging effects of hydroxychloroquine).     Pertinent Physical Exam At Time of Discharge  GEN: well-appearing, no acute distress  HEENT: PERRLA, EOMI, moist mucous membranes, no scleral icterus  NECK: Supple  CV: RRR, no murmurs/rubs/gallops  PULM: Breathing comfortably, clear to auscultation bilaterally  AB: Soft, non-tender, non-distended  : No in-dwelling holden  MSK: No lower extremity edema bilaterally  VASC: DP/PT pulses palpable bilaterally  NEURO: A&Ox3, following commands, conversational    Home Medications     Medication List      START taking these medications     amoxicillin-pot clavulanate 875-125 mg tablet;  Commonly known as:   Augmentin; Take 1 tablet by mouth every 12 hours for 7 doses.     CONTINUE taking these medications     acetaminophen 325 mg tablet; Commonly known as: Tylenol   albuterol 90 mcg/actuation inhaler   ascorbic acid 250 MG chewable tablet; Commonly known as: Vitamin C   atorvastatin 80 mg tablet; Commonly known as: Lipitor; Take 1 tablet (80   mg) by mouth once daily at bedtime.   azelastine 137 mcg (0.1 %) nasal spray; Commonly known as: Astelin   DAILY MULTIVITAMIN ORAL   dimethyl fumarate 240 mg capsule,delayed release(DR/EC) capsule;   Commonly known as: Tecfidera; TAKE ONE (1) CAPSULE BY MOUTH TWICE DAILY.   SWALLOW WHOLE. DO NOT OPEN CAPSULE. DO NOT CRUSH OR CHEW.   docusate sodium 100 mg capsule; Commonly known as: Colace   escitalopram 10 mg tablet; Commonly known as: Lexapro; Take 1 tablet (10   mg) by mouth once daily.   folic acid 1 mg tablet; Commonly known as: Folvite   furosemide 10 mg/mL solution; Commonly known as: Lasix   gabapentin 400 mg capsule; Commonly known as: Neurontin; TAKE 2 CAPSULES   (800 MG) BY MOUTH 3 TIMES A DAY.   hydroxychloroquine 200 mg tablet; Commonly known as: Plaquenil   ibuprofen 800 mg tablet   levETIRAcetam 500 mg tablet; Commonly known as: Keppra; TAKE 1 TABLET BY   MOUTH TWICE A DAY   metFORMIN 500 mg tablet; Commonly known as: Glucophage; TAKE 1 TABLET   (500 MG) BY MOUTH ONCE DAILY WITH A MEAL.   metoprolol succinate XL 50 mg 24 hr tablet; Commonly known as: Toprol-XL   nitroglycerin 0.4 mg SL tablet; Commonly known as: Nitrostat   Nurtec ODT 75 mg tablet,disintegrating; Generic drug: rimegepant;   Dissolve 1 tablet (75 mg) by mouth on or under the tongue once daily as   needed at the start of a migraine. Max 1 tablet per 24 hours.   ondansetron ODT 4 mg disintegrating tablet; Commonly known as:   Zofran-ODT; Take 1 tablet (4 mg) by mouth every 8 hours if needed for   nausea or vomiting.   sennosides 8.6 mg tablet; Commonly known as: Senokot    topiramate 100 mg tablet; Commonly known as: Topamax; TAKE 1 TABLET BY   MOUTH TWICE A DAY     ASK your doctor about these medications     dicyclomine 20 mg tablet; Commonly known as: Bentyl; Take 1 tablet (20   mg) by mouth 4 times a day as needed (abdominal pain) for up to 20 doses.       Outpatient Follow-Up  Future Appointments   Date Time Provider Department Center   10/17/2024  6:45 PM [facility] CMCM[facility]   10/24/2024  9:00 AM Tacho Lynne MD [id number] [facility]   3/20/2025  2:00 PM Silvia Molina, APRN-CNP [id number] [facility]       Afua Shields MD        >30 minutes were spent on discharge coordination and planning.

## 2024-10-15 NOTE — PROGRESS NOTES
Vancomycin Dosing by Pharmacy- Cessation of Therapy    Consult to pharmacy for vancomycin dosing has been discontinued by the prescriber, pharmacy will sign off at this time.    Please call pharmacy if there are further questions or re-enter a consult if vancomycin is resumed.     Louie Tena, MiD

## 2024-10-15 NOTE — HOSPITAL COURSE
Ms. Gallegos is a 60y/o lady with history of HTN, T2DM, Sjogren's, MS, and migraines who presented with shortness of breath. Patient stated that over the past couple days she had increased shortness of breath with exertion as well as a new cough productive of clear sputum. In the ED, the patient was afebrile and hemodynamically stable. Labs were notable for WBC 20.8 w/ 76.9% neutrophils, bicarb 20 w/ AG 14, Cr 1.03, and lactate 1.1. Urinalysis was not suggestive of infection. CTPE was negative for PE, but showed bandlike opacities through the dependent portions of the lower lobes and lingula. Patient was started on ceftriaxone and azithromycin for pneumonia coverage with improvement in her symptoms. Patient also complained of her typical migraine symptoms which was resolved with IV Ketoralac and IV metoclopramide. The patient was ultimately discharged home with a total 5 day course of PO augmentin (avoided levofloxacin due to QTc prolonging effects of hydroxychloroquine).

## 2024-10-15 NOTE — DISCHARGE INSTRUCTIONS
Dear Ms. Una Gallegos,    You came to the hospital due to shortness of breath and migraine. We found that you might have pneumonia, so we started you on antibiotics with improvement.    Please complete the course of antibiotics as prescribed. You have 4 more days of two times a day medication. Please take the antibiotic twice a day until you run out of pills.    Please continue to take all your medications as prescribed and follow-up with your primary doctor in the next 2 weeks.    Please call your doctor or return to the hospital if you develop new or concerning symptoms.    Thank you,  Your  HealthCare Team

## 2024-10-15 NOTE — PROGRESS NOTES
Physical Therapy    Physical Therapy Evaluation    Patient Name: Una Gallegos  MRN: [id number]  Department:   Room: [place] [id number]  Today's Date: 10/15/2024   Time Calculation  Start Time: 1023  Stop Time: 1040  Time Calculation (min): 17 min    Assessment/Plan   PT Assessment  PT Assessment Results: Decreased endurance, Impaired balance, Decreased mobility, Pain  Rehab Prognosis: Good  Barriers to Discharge: none  Evaluation/Treatment Tolerance: Patient limited by fatigue, Patient limited by pain  Medical Staff Made Aware: Yes  Strengths: Attitude of self, Coping skills, Physical health  Barriers to Participation: Comorbidities  End of Session Communication: Bedside nurse  Assessment Comment: The pt presented with a slightly unsteady gait using a standard cane and decreased endurance, but safe and appropriate for low intensity level therapy after d/c.  End of Session Patient Position: Bed, 2 rail up, Alarm off, not on at start of session  IP OR SWING BED PT PLAN  Inpatient or Swing Bed: Inpatient  PT Plan  Treatment/Interventions: Bed mobility, Transfer training, Gait training, Stair training, Balance training, Strengthening, Endurance training, Therapeutic exercise, Therapeutic activity, Home exercise program  PT Plan: Ongoing PT  PT Frequency: 3 times per week  PT Discharge Recommendations: Low intensity level of continued care  Equipment Recommended upon Discharge:  (none)  PT Recommended Transfer Status: Stand by assist  PT - OK to Discharge: Yes    Subjective   General Visit Information:  General  Reason for Referral: SOB  Past Medical History Relevant to Rehab: HTN, HLD, T2DM, Sjogren's, MS, lupus, epilepsy, and migraine headaches  Prior to Session Communication: Bedside nurse  Patient Position Received: Bed, 2 rail up, Alarm off, not on at start of session (kassandra)  Preferred Learning Style: verbal, visual, written  General Comment: The pt was pleasant, cooperative and willing to participate in  therapy.  Home Living:  Home Living  Type of Home: House  Lives With:  (Family)  Home Adaptive Equipment: Walker rolling or standard, Cane  Home Layout: Multi-level, Laundry in basement, Stairs to alternate level with rails  Alternate Level Stairs-Rails: Both  Alternate Level Stairs-Number of Steps: 13  Home Access: Stairs to enter with rails  Entrance Stairs-Rails: Both  Entrance Stairs-Number of Steps: 3  Bathroom Shower/Tub: Tub/shower unit  Bathroom Toilet: Handicapped height  Bathroom Equipment: Shower chair with back, Grab bars in shower  Prior Level of Function:  Prior Function Per Pt/Caregiver Report  Level of [place]: Independent with ADLs and functional transfers, Independent with homemaking with ambulation  Receives Help From: Family, Friends  ADL Assistance: Independent  Homemaking Assistance: Independent  Ambulatory Assistance: Independent  Vocational: Full time employment  Leisure: Read and outdoor activities  Hand Dominance: Right  Prior Function Comments: The pt was independent with all mobility without an assistive device in the household and in the community.  Precautions:  Precautions  Hearing/Visual Limitations: Hearing and vision WFL  Medical Precautions: Fall precautions  Precautions Comment: Pt in compliance with precaution throughout PT session.       Vital Signs Comment: vitals stable     Objective   Pain:  Pain Assessment  Pain Assessment: 0-10  0-10 (Numeric) Pain Score: 8  Pain Type: Acute pain, Chronic pain  Pain Location: Head  Pain Radiating Towards: to neck  Pain Descriptors: Aching, Pounding, Pressure  Pain Frequency: Constant/continuous  Pain Onset: Ongoing  Clinical Progression: Not changed  Pain Interventions: Ambulation/increased activity, Therapeutic presence  Response to Interventions: Pain stable  Multiple Pain Sites: Two  Pain 2  Pain Score 2: 8  Pain Type 2: Acute pain, Chronic pain  Pain Location 2: Neck  Pain Descriptors 2: Aching  Pain Frequency 2:  Constant/continuous  Pain Onset 2: On-going  Clinical Progression 2: Not changed  Pain Interventions 2: Therapeutic presence  Response to Interventions 2: Pain stable  Cognition:  Cognition  Overall Cognitive Status: Within Functional Limits  Orientation Level: Oriented X4  Following Commands: Follows all commands and directions without difficulty    General Assessments:  General Observation  General Observation: The pt presented with a slightly unsteady gait using a standard cane and decreased endurance.    Activity Tolerance  Endurance: Decreased tolerance for upright activites    Sensation  Light Touch: No apparent deficits    Strength  Strength Comments: WFL  Perception  Inattention/Neglect: Appears intact    Coordination  Movements are Fluid and Coordinated: Yes  Coordination Comment: WFL    Postural Control  Postural Control: Within Functional Limits  Posture Comment: Pt presented with good sitting and standing posture using a standard cane.    Static Sitting Balance  Static Sitting-Balance Support: Bilateral upper extremity supported, Feet supported  Static Sitting-Level of Assistance: Independent  Static Sitting-Comment/Number of Minutes: Sitting EOB  Dynamic Sitting Balance  Dynamic Sitting-Balance Support: Bilateral upper extremity supported, Feet supported  Dynamic Sitting-Level of Assistance: Independent  Dynamic Sitting-Comments: Sitting EOB    Static Standing Balance  Static Standing-Balance Support: Right upper extremity supported  Static Standing-Level of Assistance: Close supervision  Static Standing-Comment/Number of Minutes: using a standard cane  Dynamic Standing Balance  Dynamic Standing-Balance Support: Right upper extremity supported  Dynamic Standing-Level of Assistance: Close supervision  Dynamic Standing-Comments: using a standard cane  Functional Assessments:  Bed Mobility  Bed Mobility: Yes  Bed Mobility 1  Bed Mobility 1: Supine to sitting  Level of Assistance 1: Distant supervision  Bed  Mobility Comments 1: HOB slightly elevated  Bed Mobility 2  Bed Mobility  2: Sitting to supine  Level of Assistance 2: Distant supervision  Bed Mobility Comments 2: HOB slightly elevated    Transfers  Transfer: Yes  Transfer 1  Transfer From 1: Sit to  Transfer to 1: Stand  Transfer Device 1:  (no device)  Transfer Level of Assistance 1: Close supervision  Transfers 2  Transfer From 2: Stand to  Transfer to 2: Sit  Transfer Device 2:  (no device)  Transfer Level of Assistance 2: Close supervision    Ambulation/Gait Training  Ambulation/Gait Training Performed: Yes  Ambulation/Gait Training 1  Surface 1: Level tile  Device 1: Single point cane  Assistance 1: Close supervision  Quality of Gait 1: Decreased step length (slightly unsteady, decreased samm, decreased endurance)  Comments/Distance (ft) 1: 30ft    Stairs  Stairs: No  Extremity/Trunk Assessments:  Cervical Spine   Cervical Spine: Within Functional Limits  Lumbar Spine   Lumbar Spine : Within Functional Limits    RUE   RUE : Within Functional Limits  LUE   LUE: Within Functional Limits  RLE   RLE : Within Functional Limits  LLE   LLE : Within Functional Limits  Outcome Measures:  [facility] Basic Mobility  Turning from your back to your side while in a flat bed without using bedrails: A little  Moving from lying on your back to sitting on the side of a flat bed without using bedrails: A little  Moving to and from bed to chair (including a wheelchair): A little  Standing up from a chair using your arms (e.g. wheelchair or bedside chair): A little  To walk in hospital room: A little  Climbing 3-5 steps with railing: A little  Basic Mobility - Total Score: 18    Encounter Problems       Encounter Problems (Resolved)       Balance       STG - Maintains independent dynamic standing balance without upper extremity support. (Adequate for Discharge)       Start:  10/15/24    Expected End:  10/29/24    Resolved:  10/15/24         STG - Maintains independent static  standing balance without upper extremity support. (Adequate for Discharge)       Start:  10/15/24    Expected End:  10/29/24    Resolved:  10/15/24            Mobility       STG - Patient will ambulate 125ft, independently with no device. (Adequate for Discharge)       Start:  10/15/24    Expected End:  10/29/24    Resolved:  10/15/24         STG - Patient will ascend and descend a flight of stairs, independently using one rail. (Adequate for Discharge)       Start:  10/15/24    Expected End:  10/29/24    Resolved:  10/15/24            PT Transfers       STG - Transfer from bed to chair, independently with no device. (Adequate for Discharge)       Start:  10/15/24    Expected End:  10/29/24    Resolved:  10/15/24         STG - Patient to transfer to and from sit to supine, independently. (Adequate for Discharge)       Start:  10/15/24    Expected End:  10/29/24    Resolved:  10/15/24         STG - Patient will transfer sit to and from stand, independently with no device. (Adequate for Discharge)       Start:  10/15/24    Expected End:  10/29/24    Resolved:  10/15/24                Education Documentation  Body Mechanics, taught by Prashant Fatima PT at 10/15/2024  2:17 PM.  Learner: Patient  Readiness: Acceptance  Method: Explanation, Demonstration  Response: Verbalizes Understanding, Demonstrated Understanding    Home Exercise Program, taught by Prashant Fatima PT at 10/15/2024  2:17 PM.  Learner: Patient  Readiness: Acceptance  Method: Explanation, Demonstration  Response: Verbalizes Understanding, Demonstrated Understanding    Mobility Training, taught by Prashant Fatima PT at 10/15/2024  2:17 PM.  Learner: Patient  Readiness: Acceptance  Method: Explanation, Demonstration  Response: Verbalizes Understanding, Demonstrated Understanding    Education Comments  No comments found.

## 2024-10-16 ENCOUNTER — PATIENT OUTREACH (OUTPATIENT)
Dept: CARE COORDINATION | Facility: CLINIC | Age: 59
End: 2024-10-16
Payer: COMMERCIAL

## 2024-10-16 NOTE — PROGRESS NOTES
Discharge facility: [facility]  Discharge diagnosis:  Community acquired pneumonia  Sjogren's disease  Multiple sclerosis  Migraines   Admission date: 10/13/24  Discharge date: 10/15/24  PCP Appointment Date: Needs scheduled   Specialist Appointment Date: Pain med 10/24/24, Neuro 3/20/25    Hospital Encounter and Summary: Linked    Outreach call to patient to support a smooth transition of care from recent admission.  Left voicemail message for patient with my contact information. Will continue to follow through transition period.     Yolanda Heard RN, [facility] [place]  Accountable Care Organization Operations Office  [address] [place] [address]  Phone [phone]

## 2024-10-17 ENCOUNTER — HOSPITAL ENCOUNTER (OUTPATIENT)
Dept: RADIOLOGY | Facility: HOSPITAL | Age: 59
Discharge: HOME | End: 2024-10-17
Payer: COMMERCIAL

## 2024-10-17 DIAGNOSIS — M54.16 LUMBAR RADICULOPATHY: ICD-10-CM

## 2024-10-17 PROCEDURE — 72148 MRI LUMBAR SPINE W/O DYE: CPT

## 2024-10-18 LAB
BACTERIA BLD CULT: NORMAL
BACTERIA BLD CULT: NORMAL

## 2024-10-19 PROBLEM — J18.9 PNEUMONIA DUE TO INFECTIOUS ORGANISM: Status: ACTIVE | Noted: 2024-10-19

## 2024-10-19 PROBLEM — Z87.09 HISTORY OF ASTHMA: Status: ACTIVE | Noted: 2024-10-19

## 2024-10-22 DIAGNOSIS — G35 MULTIPLE SCLEROSIS (MULTI): ICD-10-CM

## 2024-10-23 ENCOUNTER — CLINICAL SUPPORT (OUTPATIENT)
Dept: EMERGENCY MEDICINE | Facility: HOSPITAL | Age: 59
End: 2024-10-23
Payer: COMMERCIAL

## 2024-10-23 ENCOUNTER — APPOINTMENT (OUTPATIENT)
Dept: RADIOLOGY | Facility: HOSPITAL | Age: 59
End: 2024-10-23
Payer: COMMERCIAL

## 2024-10-23 ENCOUNTER — HOSPITAL ENCOUNTER (EMERGENCY)
Facility: HOSPITAL | Age: 59
Discharge: HOME | End: 2024-10-24
Attending: EMERGENCY MEDICINE
Payer: COMMERCIAL

## 2024-10-23 VITALS
HEART RATE: 89 BPM | BODY MASS INDEX: 30.93 KG/M2 | RESPIRATION RATE: 18 BRPM | TEMPERATURE: 97.7 F | WEIGHT: 197.09 LBS | OXYGEN SATURATION: 95 % | HEIGHT: 67 IN | SYSTOLIC BLOOD PRESSURE: 124 MMHG | DIASTOLIC BLOOD PRESSURE: 76 MMHG

## 2024-10-23 DIAGNOSIS — R56.9 SEIZURE (MULTI): Primary | ICD-10-CM

## 2024-10-23 LAB
ALBUMIN SERPL BCP-MCNC: 4.3 G/DL (ref 3.4–5)
ALP SERPL-CCNC: 64 U/L (ref 33–110)
ALT SERPL W P-5'-P-CCNC: 13 U/L (ref 7–45)
ANION GAP SERPL CALC-SCNC: 12 MMOL/L (ref 10–20)
APPEARANCE UR: CLEAR
APTT PPP: 21 SECONDS (ref 27–38)
AST SERPL W P-5'-P-CCNC: 15 U/L (ref 9–39)
BASOPHILS # BLD AUTO: 0.01 X10*3/UL (ref 0–0.1)
BASOPHILS NFR BLD AUTO: 0.1 %
BILIRUB SERPL-MCNC: 0.7 MG/DL (ref 0–1.2)
BILIRUB UR STRIP.AUTO-MCNC: NEGATIVE MG/DL
BNP SERPL-MCNC: 9 PG/ML (ref 0–99)
BUN SERPL-MCNC: 26 MG/DL (ref 6–23)
CALCIUM SERPL-MCNC: 9.4 MG/DL (ref 8.6–10.6)
CARDIAC TROPONIN I PNL SERPL HS: 3 NG/L (ref 0–34)
CARDIAC TROPONIN I PNL SERPL HS: 3 NG/L (ref 0–34)
CHLORIDE SERPL-SCNC: 108 MMOL/L (ref 98–107)
CO2 SERPL-SCNC: 24 MMOL/L (ref 21–32)
COLOR UR: ABNORMAL
CREAT SERPL-MCNC: 0.95 MG/DL (ref 0.5–1.05)
EGFRCR SERPLBLD CKD-EPI 2021: 69 ML/MIN/1.73M*2
EOSINOPHIL # BLD AUTO: 0.05 X10*3/UL (ref 0–0.7)
EOSINOPHIL NFR BLD AUTO: 0.5 %
ERYTHROCYTE [DISTWIDTH] IN BLOOD BY AUTOMATED COUNT: 14.5 % (ref 11.5–14.5)
GLUCOSE BLD MANUAL STRIP-MCNC: 97 MG/DL (ref 74–99)
GLUCOSE SERPL-MCNC: 61 MG/DL (ref 74–99)
GLUCOSE UR STRIP.AUTO-MCNC: NORMAL MG/DL
HCT VFR BLD AUTO: 38.8 % (ref 36–46)
HGB BLD-MCNC: 11.3 G/DL (ref 12–16)
IMM GRANULOCYTES # BLD AUTO: 0.04 X10*3/UL (ref 0–0.7)
IMM GRANULOCYTES NFR BLD AUTO: 0.4 % (ref 0–0.9)
INR PPP: 1 (ref 0.9–1.1)
KETONES UR STRIP.AUTO-MCNC: NEGATIVE MG/DL
LEUKOCYTE ESTERASE UR QL STRIP.AUTO: NEGATIVE
LYMPHOCYTES # BLD AUTO: 4.24 X10*3/UL (ref 1.2–4.8)
LYMPHOCYTES NFR BLD AUTO: 44.6 %
MAGNESIUM SERPL-MCNC: 2.6 MG/DL (ref 1.6–2.4)
MCH RBC QN AUTO: 26.7 PG (ref 26–34)
MCHC RBC AUTO-ENTMCNC: 29.1 G/DL (ref 32–36)
MCV RBC AUTO: 92 FL (ref 80–100)
MONOCYTES # BLD AUTO: 0.82 X10*3/UL (ref 0.1–1)
MONOCYTES NFR BLD AUTO: 8.6 %
NEUTROPHILS # BLD AUTO: 4.34 X10*3/UL (ref 1.2–7.7)
NEUTROPHILS NFR BLD AUTO: 45.8 %
NITRITE UR QL STRIP.AUTO: NEGATIVE
NRBC BLD-RTO: 0 /100 WBCS (ref 0–0)
PH UR STRIP.AUTO: 6 [PH]
PHOSPHATE SERPL-MCNC: 2.6 MG/DL (ref 2.5–4.9)
PLATELET # BLD AUTO: 149 X10*3/UL (ref 150–450)
POTASSIUM SERPL-SCNC: 3.4 MMOL/L (ref 3.5–5.3)
PROT SERPL-MCNC: 6.5 G/DL (ref 6.4–8.2)
PROT UR STRIP.AUTO-MCNC: NEGATIVE MG/DL
PROTHROMBIN TIME: 10.7 SECONDS (ref 9.8–12.8)
RBC # BLD AUTO: 4.23 X10*6/UL (ref 4–5.2)
RBC # UR STRIP.AUTO: NEGATIVE /UL
SODIUM SERPL-SCNC: 141 MMOL/L (ref 136–145)
SP GR UR STRIP.AUTO: >1.05
UROBILINOGEN UR STRIP.AUTO-MCNC: NORMAL MG/DL
VALPROATE SERPL-MCNC: <10 UG/ML (ref 50–100)
WBC # BLD AUTO: 9.5 X10*3/UL (ref 4.4–11.3)

## 2024-10-23 PROCEDURE — 82947 ASSAY GLUCOSE BLOOD QUANT: CPT

## 2024-10-23 PROCEDURE — 70450 CT HEAD/BRAIN W/O DYE: CPT

## 2024-10-23 PROCEDURE — 2500000001 HC RX 250 WO HCPCS SELF ADMINISTERED DRUGS (ALT 637 FOR MEDICARE OP): Mod: SE

## 2024-10-23 PROCEDURE — 93005 ELECTROCARDIOGRAM TRACING: CPT

## 2024-10-23 PROCEDURE — 70496 CT ANGIOGRAPHY HEAD: CPT

## 2024-10-23 PROCEDURE — 71046 X-RAY EXAM CHEST 2 VIEWS: CPT | Mod: FOREIGN READ | Performed by: RADIOLOGY

## 2024-10-23 PROCEDURE — 93010 ELECTROCARDIOGRAM REPORT: CPT | Performed by: EMERGENCY MEDICINE

## 2024-10-23 PROCEDURE — 85025 COMPLETE CBC W/AUTO DIFF WBC: CPT

## 2024-10-23 PROCEDURE — 82947 ASSAY GLUCOSE BLOOD QUANT: CPT | Mod: 59

## 2024-10-23 PROCEDURE — 84484 ASSAY OF TROPONIN QUANT: CPT

## 2024-10-23 PROCEDURE — 80164 ASSAY DIPROPYLACETIC ACD TOT: CPT

## 2024-10-23 PROCEDURE — 70498 CT ANGIOGRAPHY NECK: CPT | Performed by: RADIOLOGY

## 2024-10-23 PROCEDURE — 85610 PROTHROMBIN TIME: CPT

## 2024-10-23 PROCEDURE — 83735 ASSAY OF MAGNESIUM: CPT

## 2024-10-23 PROCEDURE — 80053 COMPREHEN METABOLIC PANEL: CPT

## 2024-10-23 PROCEDURE — 99285 EMERGENCY DEPT VISIT HI MDM: CPT | Mod: 25

## 2024-10-23 PROCEDURE — 2500000002 HC RX 250 W HCPCS SELF ADMINISTERED DRUGS (ALT 637 FOR MEDICARE OP, ALT 636 FOR OP/ED): Mod: SE

## 2024-10-23 PROCEDURE — 70496 CT ANGIOGRAPHY HEAD: CPT | Performed by: RADIOLOGY

## 2024-10-23 PROCEDURE — 36415 COLL VENOUS BLD VENIPUNCTURE: CPT

## 2024-10-23 PROCEDURE — 83880 ASSAY OF NATRIURETIC PEPTIDE: CPT

## 2024-10-23 PROCEDURE — 71046 X-RAY EXAM CHEST 2 VIEWS: CPT

## 2024-10-23 PROCEDURE — 2550000001 HC RX 255 CONTRASTS: Mod: SE

## 2024-10-23 PROCEDURE — 96374 THER/PROPH/DIAG INJ IV PUSH: CPT | Mod: 59

## 2024-10-23 PROCEDURE — 93005 ELECTROCARDIOGRAM TRACING: CPT | Mod: 76

## 2024-10-23 PROCEDURE — 85730 THROMBOPLASTIN TIME PARTIAL: CPT

## 2024-10-23 PROCEDURE — 99285 EMERGENCY DEPT VISIT HI MDM: CPT | Performed by: EMERGENCY MEDICINE

## 2024-10-23 PROCEDURE — 84100 ASSAY OF PHOSPHORUS: CPT

## 2024-10-23 PROCEDURE — 81003 URINALYSIS AUTO W/O SCOPE: CPT

## 2024-10-23 PROCEDURE — 2500000004 HC RX 250 GENERAL PHARMACY W/ HCPCS (ALT 636 FOR OP/ED): Mod: SE

## 2024-10-23 RX ORDER — POTASSIUM CHLORIDE 20 MEQ/1
20 TABLET, EXTENDED RELEASE ORAL ONCE
Status: COMPLETED | OUTPATIENT
Start: 2024-10-23 | End: 2024-10-23

## 2024-10-23 RX ORDER — LEVETIRACETAM 500 MG/1
1000 TABLET ORAL ONCE
Status: COMPLETED | OUTPATIENT
Start: 2024-10-23 | End: 2024-10-23

## 2024-10-23 RX ORDER — HYDROMORPHONE HYDROCHLORIDE 1 MG/ML
0.5 INJECTION, SOLUTION INTRAMUSCULAR; INTRAVENOUS; SUBCUTANEOUS ONCE
Status: COMPLETED | OUTPATIENT
Start: 2024-10-23 | End: 2024-10-23

## 2024-10-23 RX ORDER — LEVETIRACETAM 500 MG/1
500 TABLET ORAL 2 TIMES DAILY
Qty: 60 TABLET | Refills: 0 | Status: SHIPPED | OUTPATIENT
Start: 2024-10-23 | End: 2024-10-31 | Stop reason: SDUPTHER

## 2024-10-23 RX ORDER — DIMETHYL FUMARATE 240 MG/1
CAPSULE ORAL
Qty: 180 CAPSULE | Refills: 1 | Status: SHIPPED | OUTPATIENT
Start: 2024-10-23 | End: 2025-10-23

## 2024-10-23 ASSESSMENT — PAIN - FUNCTIONAL ASSESSMENT: PAIN_FUNCTIONAL_ASSESSMENT: 0-10

## 2024-10-23 ASSESSMENT — LIFESTYLE VARIABLES
TOTAL SCORE: 0
EVER HAD A DRINK FIRST THING IN THE MORNING TO STEADY YOUR NERVES TO GET RID OF A HANGOVER: NO
EVER FELT BAD OR GUILTY ABOUT YOUR DRINKING: NO
HAVE PEOPLE ANNOYED YOU BY CRITICIZING YOUR DRINKING: NO
HAVE YOU EVER FELT YOU SHOULD CUT DOWN ON YOUR DRINKING: NO

## 2024-10-23 ASSESSMENT — PAIN SCALES - GENERAL
PAINLEVEL_OUTOF10: 10 - WORST POSSIBLE PAIN
PAINLEVEL_OUTOF10: 10 - WORST POSSIBLE PAIN

## 2024-10-23 ASSESSMENT — PAIN DESCRIPTION - LOCATION
LOCATION: BACK
LOCATION: BACK

## 2024-10-23 ASSESSMENT — PAIN DESCRIPTION - PAIN TYPE: TYPE: ACUTE PAIN

## 2024-10-23 NOTE — ED PROVIDER NOTES
History of Present Illness     History provided by: Patient and Nursing Staff  Limitations to History: None  External Records Reviewed with Brief Summary: Discharge Summary from 10/15/2024 which showed admission for pneumonia    HPI:  Una Gallegos is a 59 y.o. female with a past medical history of hypertension, type 2 diabetes, Sjogren's, MS and migraines who is presenting today with a syncopal episode.  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  Reports that they were unresponsive while at work giving a presentation.  Reports that they were sitting when they became unconscious and fell to the ground.  On my examination, patient was intermittently responsive.  Patient was able to tell me their name and their birthday.  Patient was able to follow some commands however was not responsive for other providers.  Patient did have a facial droop on the right as well as a drift on the left.  Blood sugar was within normal limits per EMS.    Physical Exam   Triage vitals:  T 36.5 °C (97.7 °F)  HR 92  /85  RR 15  O2 97 % None (Room air)    General: Awake, alert, in no acute distress  Eyes: Gaze conjugate.  No scleral icterus or injection  HENT: Normo-cephalic, atraumatic. No stridor  CV: Regular rate, regular rhythm. Radial pulses 2+ bilaterally  Resp: Breathing non-labored, speaking in full sentences.  Clear to auscultation bilaterally  GI: Soft, non-distended, non-tender. No rebound or guarding.  MSK/Extremities: No gross bony deformities. Moving all extremities  Skin: Warm. Appropriate color  Neuro: Able to tell me name and birthdate.  Drift in her bilateral upper extremities.  Some effort against gravity in the lower extremity.   facial droop to right face.  NIH stroke scale of 11  Psych: Appropriate mood and affect      Medical Decision Making & ED Course   Medical Decision Makin y.o. female with a past medical history of  hypertension, type 2 diabetes, Sjogren's, MS and migraines who is presenting today with concern for syncopal versus seizure event.  Patient's vitals were within normal limits.  Blood sugar was within normal limits.  On exam, patient initially show he scored an NIH stroke scale of 11.  At this point concern for postictal versus stroke versus infectious encephalopathy, metabolic encephalopathy, PNES.  Patient was seen and evaluated with neuro at bedside.  Patient was taken for a CT head and CT angio.  CT head did not show new lesions.  CT angio did not show a large vessel occlusion.  Patient's lab work was otherwise reviewed which showed a mild hypokalemia as well as a hypoglycemia.  Patient was tolerating p.o. intake and did eat after that glucose was obtained.  Troponins were negative.  BNP was negative.  Patient had a chest x-ray that showed concern for bilateral infiltrates however when reviewing old chest x-rays believe that this is likely secondary to body habitus and was similar to prior.  Patient was improving and back to baseline.  Patient was requesting pain meds was given a dose of Dilaudid.  Patient was reporting concern for cauda equina syndrome on a MRI that was done as an outpatient and she supposed to follow-up with pain management tomorrow.  Patient does not have symptoms of cauda equina syndrome on my evaluation says she still able to go to the bathroom okay no weakness or numbness in the lower extremities at this reevaluation.  Patient's MRI was reviewed which showed crowding of the cauda equina nerve roots but no compression.  Patient will likely need further evaluation by a spine specialist as an outpatient.  Patient does have a pain management appointment tomorrow.  Patient was advised to return to the pain management appointment tomorrow.  Patient reported that she was post be on Keppra for her seizure disorder and has not been taking it.  Believe that this is likely secondary to a seizure  disorder.  Patient was Keppra loaded and discharged with a prescription for Keppra 500 twice daily.  Patient was signed out to incoming provider pending a UA.  ----      Differential diagnoses considered include but are not limited to: Seizure, syncope, hypoglycemia, infectious, metabolic, stroke     Social Determinants of Health which Significantly Impact Care: None identified     EKG Independent Interpretation: EKG interpreted by myself. Please see ED Course and MDM for full interpretation.    Independent Result Review and Interpretation: Results were independently reviewed and interpreted by myself. Please see ED course and [facility] for full interpretation.    Chronic conditions affecting the patient's care: As documented in the MDM    The patient was discussed with the following consultants/services:  Neurostroke for initial evaluation who believe that this is likely not a stroke.    Care Considerations: As per [facility]    ED Course:  ED Course as of 10/25/24 1838   Wed Oct 23, 2024   1940 Chest xray showed concern for concern for pulmoary edema however it appears similar to prior.  [ECHO]   2336 EKG was independently reviewed and showed sinus rhythm at a rate 88.  QTc 459.  No ST segment elevations or inversions. No signs of brugada.  [ECHO]   u Oct 24, 2024   0045 Specific Gravity, Urine(!): >1.050 [AD]   0134 POCT Glucose: 99 [FN]      ED Course User Index  [AD] Maria Alejandra Chiu DO  [ECHO] Sarah Ray MD  [FN] Cali Correa MD         Diagnoses as of 10/25/24 1838   Seizure (Multi)     Disposition   Patient was signed out to Dr. MAE vang at 11 PM pending completion of their work-up.  Please see the next provider's transition of care note for the remainder of the patient's care.     Procedures   Procedures    Patient seen and discussed with ED attending physician.    Sarah Ray MD  Emergency Medicine     Sarah Ray MD  Resident  10/23/24 2337       Sarah Ray MD  Resident  10/25/24 1838

## 2024-10-23 NOTE — ED TRIAGE NOTES
Pt to ED via EMS due to syncopal episode then fall at work. Pt is unsure if she hit her head or any other part of her body. Pt endorses back pain and hip pain rated 10/10. Vital signs stable on arrival.

## 2024-10-23 NOTE — SIGNIFICANT EVENT
"CANCELLED BAT NOTE  This is a 60 yo F with PMH of RRMS on DMF, mirgaine, Sjogren, unspecified seizure disorder on keppra, chronic back pain s/p C4-6 decompression and fusion 2020. BAT called 5:43pm for UE weakness and dysarthria. /85, BG 97.    Pt was at her work in a meeting when she suddenly stopped talking, looked like she was looking for words, then slumped to the right side, eyes closed, unresponsive, no convulsions noted per staff. This all occurred while she was sitting. EMS was called and she was brought to ED. Per ED team, pt did not communicate or move extremities with EMS, however she started communicating with the ED team about back pain and concern about having cauda equina.     However, on neuro evaluation, pt became less interactive again though her eyes were open and tracking. NIHSS 26 (LOC 1, Questions 2, Commands 2, R FD 1, LUE 4, RUE 4, LLE 4, RLE 3, Language 3, Dysarthria 2). CTH wo acute changes, CTA wo LVO. Repeat exam showed progressively improving cooperation. She started describing how she was talking then had dizziness. Repeat NIHSS (Questions 2, LUE 2, RUE 2, LLE 2, RLE 2, Dysarthria 1).     Upon talking to family and coworkers, it was noted the pt was under severe stress from multiple aspects in her life including familial concerns, health concerns where she thought she had cauda equina syndrome (read radiology report mentioning non-compressed cauda equina and thought she had the syndrome), as well as work issues. She had a passing out episode at work years ago as well as a \"minor stroke\" about 3 years ago at work (slurred speech, transient).     Pt has stable RRMS and follows with JUAN Molina.    There was low concern for stroke given inconsistent and improving exam. There was low concern for seizure given presycopal symptoms and lack of convulsions.     Recommendations:  Syncopal and infectious/metabolic workup per ED  Outpatient follow up with neurology    Neurology " PGY-3  General Neurology team pager [phone]

## 2024-10-24 ENCOUNTER — APPOINTMENT (OUTPATIENT)
Dept: PAIN MEDICINE | Facility: CLINIC | Age: 59
End: 2024-10-24
Payer: COMMERCIAL

## 2024-10-24 ENCOUNTER — PATIENT OUTREACH (OUTPATIENT)
Dept: CARE COORDINATION | Facility: CLINIC | Age: 59
End: 2024-10-24

## 2024-10-24 DIAGNOSIS — M54.16 LUMBAR RADICULOPATHY: ICD-10-CM

## 2024-10-24 LAB
GLUCOSE BLD MANUAL STRIP-MCNC: 99 MG/DL (ref 74–99)
HOLD SPECIMEN: NORMAL

## 2024-10-24 PROCEDURE — 99213 OFFICE O/P EST LOW 20 MIN: CPT | Performed by: PAIN MEDICINE

## 2024-10-24 PROCEDURE — 82947 ASSAY GLUCOSE BLOOD QUANT: CPT

## 2024-10-24 ASSESSMENT — PAIN SCALES - GENERAL: PAINLEVEL_OUTOF10: 7

## 2024-10-24 NOTE — DISCHARGE INSTRUCTIONS
You have been evaluated in the Emergency Department today for a seizure. Please call your neurologist for follow up. Please continue taking your medicine exactly as directed. Skipping doses can affect the way your body handles the medicine, which could cause you to have a seizure.     Enjoy your normal activities, however avoid hazardous activities, such as mountain climbing or scuba diving. A seizure under these conditions could lead to a fatal accident. Do not swim alone or participate in other similar activities (showering bathing) without others nearby. Do not drive or operating heavy machinery until you ask your healthcare provider about any restrictions. Check with your [facility] department of public safety to learn whether there are any driving limitations based on your condition.Don´t drink alcohol or use any medicine without talking with your primary care provider first. Tell your family members or friends to CALL 911 right away if you have:    Seizure that lasts more than 5 minutes, multiple seizures in a row, no recovery of consciousness after the seizure stops Otherwise, or other symptoms that are concerning to you. Please have them call your healthcare provider right away if you have seizures that are getting longer and worse, seizures that are different from those you´ve had in the past, seizures strong enough to cause injury. Please seek medical care for any fevers/chills, headache that is new or unsual, changes in behavior, or any other symptoms that are concerning to you.     PLEASE START KEPPRA 500 MG TWICE A DAY LIKE PREVIOUSLY PRESCRIBED. FOLLOW UP WITH NEUROLOGY

## 2024-10-24 NOTE — PROGRESS NOTES
Emergency Department Transition of Care Note       Signout   I received Una Gallegos in signout from Dr. Ray.  Please see the ED Provider Note for all HPI, PE and MDM up to the time of signout at 2300.  This is in addition to the primary record.    In brief Una Gallegos is an 59 y.o. female presenting for seizure from home.  Has prior history of seizures, however had been discontinued from meds given that she has not had a seizure in quite some time.  Workup has been overall unremarkable, no signs of infection, however still pending urinalysis.  CT negative for any acute findings.  Blood sugar improved after p.o. intake which she was able to tolerate appropriately. She was given a Keppra load, and potassium replacement    At the time of signout we were awaiting:  Urinalysis    ED Course & Medical Decision Making   Medical Decision Making:  Under my care, patient made hemodynamically stable without any acute seizure-like activity.  She is back to her baseline per sister at bedside.  Patient also states that she feels like herself, but is slightly sleepy after antiseizure medications and seizure today.  Follows commands appropriately and is alert and oriented x 4.  Given that she had prior seizures, will have her follow-up with neurology for further management.  Will prescribe previous dose of Keppra, advised to continue to take.  Discussed seizure precautions, advised to avoid driving, climbing ladders, operating heavy machinery, or swimming or sitting any bodies of water that are over an inch.  Patient is comfortable with this plan, given referral for neurology as well.    ED Course:  ED Course as of 10/24/24 0151   Wed Oct 23, 2024   1940 Chest xray showed concern for concern for pulmoary edema however it appears similar to prior.  [ECHO]   2336 EKG was independently reviewed and showed sinus rhythm at a rate 88.  QTc 459.  No ST segment elevations or inversions. No signs of brugada.  [ECHO]   Thu Oct 24,  2024 0045 Specific Gravity, Urine(!): >1.050 [AD]   0134 POCT Glucose: 99 [FN]      ED Course User Index  [AD] Maria Alejandra Wilkinson DO  [ECHO] Sarah Ray MD  [FN] Cali Correa MD         Diagnoses as of 10/24/24 0151   Seizure (Multi)       Disposition   As a result of the work-up, the patient was discharged home.  she was informed of her diagnosis and instructed to come back with any concerns or worsening of condition.  she and was agreeable to the plan as discussed above.  she was given the opportunity to ask questions.  All of the patient's questions were answered.    Procedures   Procedures    Patient seen and discussed with ED attending physician.    Maria Alejandra Wilkinson DO  Emergency Medicine

## 2024-10-24 NOTE — PROGRESS NOTES
Outreach call to patient to support a smooth transition of care from recent ED admission.  Left voicemail message for patient with my contact information. Will continue to follow through transition period.    Yolanda Heard RN, [facility]  Accountable Care Organization Operations Office  [facility] [place] [address]  Phone [phone]

## 2024-10-24 NOTE — PROGRESS NOTES
10/24/2024    Follow visit    Ms. Gallegos is her today to discuss the MRI findings and advice. We discussed the details of the MRI and we put a plane for her     Plan  Refer to spine surgeon to discuss the feasibility for spine surgery    PT     Reduce the frequency of her infusion treatment she takes every week     MD Tacho Shaw MD  Anesthesiologist  General Surgery     Progress Notes     Signed     Encounter Date: 10/10/2024     Signed         10/10/2024  Virtual interview     Ms. Gallegos had virtual interview today. She is a pleasant 59 y AA well known to my clinic. Multiple pain generators. Including cervical postlaminectomy and lumbar neuritis. Recently she had epidural injection , she reported good help but she gets spasms and her feet locked often. She is scheduled to have lumbar MRI in one week. She also reported about hand shaking with uncontrolled      Plan  Will wait for lumbar MRI to review any new findings  Will see pt in person once she gets the MRI completed  Was advised to review her shaking hand and tremors with PCP/neurologist       MD Akil Shaw MD   Physician  Specialty: Orthopaedic Surgery     Progress Notes     Signed     Encounter Date: 5/7/2024      Signed        Expand All Collapse All       Subjective  Patient ID: Una Gallegos is a 58 y.o. female.     Chief Complaint: No chief complaint on file.     Last Surgery: Left ankle removal of hardware  Last Surgery Date: 2/8/2024     HPI  Patient is a 58 y.o. female who is s/p left ankle removal of hardware. Date of surgery was 2/8/2024. Patient continues to be weight bearing at this time.  Patient reported that she has done very well.  Her wound is completely healed.  No drainage.  Her preoperative hardware pain is completely resolved.  Her only complaint today is diminished sensation about the sural nerve distribution on the lateral aspect of the foot.  She  is very happy with her outcome.  ROS: All other systems have been reviewed and are negative except as previously noted in history of present illness.       IMP:  Problem List Items Addressed This Visit         Closed fracture of tibial plafond with fibula involvement with routine healing     Relevant Orders     XR ankle left 3+ views            Objective  General: Alert and oriented x 3, NAD, respirations easy and unlabored with no audible wheezes, skin warm and dry, speech and dress appropriate for noted age, affect euthymic.      Musculoskeletal: Left Ankle  incisions c/d/i  No swelling at bimalleolar ankle  compartments soft  no calf tenderness  sensation intact to light touch  motor intact including TA/GS/EHL  palpable DP/PT pulses 2+   Good range of motion of the ankle.  No tenderness to palpation along the incision about the ankle.  X-ray: Images of left ankle reviewed personally by me today and reveals interval change of removed tibia and fibula hardware. Ankle mortise is intact.            Assessment/Plan  Encounter Diagnoses:  Closed fracture of tibial plafond with fibula involvement with routine healing, unspecified laterality, subsequent encounter     PLAN: Patient is s/p left ankle removal of hardware.  Patient has good function.  No pain.  We discussed the diminished sensation of the sural nerve distribution.  Will continue to observe.  He should improve over time.  At this point she will perform activities as tolerated with any restriction.  Given that she is doing well she will follow-up with me unless in the future.        Orders Placed This Encounter    XR ankle left 3+ views      No follow-ups on file.                  Electronically signed by Akil Collier MD at 5/7/2024 11:53 AM            Office Visit on 5/7/2024           Note shared with patient  Additional Documentation     Flowsheets: Interfaced Flowsheet Data   Encounter Info: Billing Info,     History,     Allergies      Orders  Placed         XR ankle left 3+ views  Medication Changes        None  Medication List  Visit Diagnoses        Closed fracture of tibial plafond with fibula involvement with routine healing, unspecified laterality, subsequent encounter  Problem List              Electronically signed by Tacho Lynne MD at 10/10/2024  2:27 PM         Telemedicine on 10/10/2024          Note shared with patient  Additional Documentation    Flowsheets: Interfaced Flowsheet Data   Encounter Info: Billing Info,     History,     Allergies     Orders Placed    None  Medication Changes      None  Medication List  Visit Diagnoses      Cervical radiculitis    Lumbar disc disease  Problem List

## 2024-10-30 ENCOUNTER — PATIENT OUTREACH (OUTPATIENT)
Dept: CARE COORDINATION | Facility: CLINIC | Age: 59
End: 2024-10-30
Payer: COMMERCIAL

## 2024-10-31 ENCOUNTER — OFFICE VISIT (OUTPATIENT)
Dept: NEUROLOGY | Facility: CLINIC | Age: 59
End: 2024-10-31
Payer: COMMERCIAL

## 2024-10-31 DIAGNOSIS — R56.9 SEIZURE (MULTI): Primary | ICD-10-CM

## 2024-10-31 DIAGNOSIS — G35 MULTIPLE SCLEROSIS (MULTI): ICD-10-CM

## 2024-10-31 PROCEDURE — 99215 OFFICE O/P EST HI 40 MIN: CPT | Performed by: NURSE PRACTITIONER

## 2024-10-31 RX ORDER — LEVETIRACETAM 500 MG/1
500 TABLET ORAL 2 TIMES DAILY
Qty: 60 TABLET | Refills: 0 | Status: SHIPPED | OUTPATIENT
Start: 2024-10-31 | End: 2024-11-30

## 2024-10-31 RX ORDER — GABAPENTIN 400 MG/1
800 CAPSULE ORAL 3 TIMES DAILY
Qty: 180 CAPSULE | Refills: 0 | Status: SHIPPED | OUTPATIENT
Start: 2024-10-31 | End: 2024-11-30

## 2024-10-31 ASSESSMENT — VISUAL ACUITY: VA_NORMAL: 1

## 2024-11-08 DIAGNOSIS — G35 MULTIPLE SCLEROSIS (MULTI): Primary | ICD-10-CM

## 2024-11-11 ENCOUNTER — APPOINTMENT (OUTPATIENT)
Dept: NEUROLOGY | Facility: HOSPITAL | Age: 59
End: 2024-11-11
Payer: COMMERCIAL

## 2024-11-14 ENCOUNTER — PATIENT OUTREACH (OUTPATIENT)
Dept: CARE COORDINATION | Facility: CLINIC | Age: 59
End: 2024-11-14
Payer: COMMERCIAL

## 2024-11-14 NOTE — PROGRESS NOTES
Check in 30 days after hospital discharge to support smooth transition of care. Will close this DANIEL encounter at this time due to unable to reach patient upon 30 days.    Yolanda Herad RN, [facility]  Phone [phone]

## 2024-11-19 ENCOUNTER — HOSPITAL ENCOUNTER (OUTPATIENT)
Dept: RADIOLOGY | Facility: HOSPITAL | Age: 59
Discharge: HOME | End: 2024-11-19
Payer: COMMERCIAL

## 2024-11-19 DIAGNOSIS — R56.9 SEIZURE (MULTI): ICD-10-CM

## 2024-11-19 PROCEDURE — A9575 INJ GADOTERATE MEGLUMI 0.1ML: HCPCS | Mod: SE | Performed by: NURSE PRACTITIONER

## 2024-11-19 PROCEDURE — 70553 MRI BRAIN STEM W/O & W/DYE: CPT | Performed by: RADIOLOGY

## 2024-11-19 PROCEDURE — 70553 MRI BRAIN STEM W/O & W/DYE: CPT

## 2024-11-19 PROCEDURE — 2550000001 HC RX 255 CONTRASTS: Mod: SE | Performed by: NURSE PRACTITIONER

## 2024-11-19 RX ORDER — GADOTERATE MEGLUMINE 376.9 MG/ML
20 INJECTION INTRAVENOUS
Status: COMPLETED | OUTPATIENT
Start: 2024-11-19 | End: 2024-11-19

## 2024-11-25 ENCOUNTER — APPOINTMENT (OUTPATIENT)
Dept: PAIN MEDICINE | Facility: CLINIC | Age: 59
End: 2024-11-25
Payer: COMMERCIAL

## 2024-11-25 DIAGNOSIS — M51.9 LUMBAR DISC DISEASE: ICD-10-CM

## 2024-11-25 DIAGNOSIS — M54.12 CERVICAL RADICULOPATHY: Primary | ICD-10-CM

## 2024-11-25 PROCEDURE — 99213 OFFICE O/P EST LOW 20 MIN: CPT | Performed by: PAIN MEDICINE

## 2024-11-25 NOTE — PROGRESS NOTES
11/25/2024    Virtual interview    Ms. Gallegos had virtual interview today. She reports she seizure activity few weeks ago, first time for long time . She has been diagnosed with MS as well. She under neurologist care for her MS and seizure and changed her meds. She has nt seen her spine surgeon yet scheduled on the 10th of December. She had only one infusion treatment last week for her pain flaring up    Plan  Reduce her infusion treatment   Wait for the outcome of her spine surgeon visit      MD Tacho Shaw MD  Anesthesiologist  General Surgery     Progress Notes     Signed     Encounter Date: 10/24/2024     Signed         10/24/2024     Follow visit     Ms. Gallegos is her today to discuss the MRI findings and advice. We discussed the details of the MRI and we put a plane for her      Plan  Refer to spine surgeon to discuss the feasibility for spine surgery     PT      Reduce the frequency of her infusion treatment she takes every week      MD Tacho Shaw MD  Anesthesiologist  General Surgery     Progress Notes     Signed     Encounter Date: 10/10/2024      Signed          10/10/2024  Virtual interview     Ms. Gallegos had virtual interview today. She is a pleasant 59 y AA well known to my clinic. Multiple pain generators. Including cervical postlaminectomy and lumbar neuritis. Recently she had epidural injection , she reported good help but she gets spasms and her feet locked often. She is scheduled to have lumbar MRI in one week. She also reported about hand shaking with uncontrolled      Plan  Will wait for lumbar MRI to review any new findings  Will see pt in person once she gets the MRI completed  Was advised to review her shaking hand and tremors with PCP/neurologist       MD Akil Shaw MD   Physician  Specialty: Orthopaedic Surgery     Progress Notes     Signed      Encounter Date: 5/7/2024      Signed        Expand All Collapse All       Subjective  Patient ID: Una Gallegos is a 58 y.o. female.     Chief Complaint: No chief complaint on file.     Last Surgery: Left ankle removal of hardware  Last Surgery Date: 2/8/2024     HPI  Patient is a 58 y.o. female who is s/p left ankle removal of hardware. Date of surgery was 2/8/2024. Patient continues to be weight bearing at this time.  Patient reported that she has done very well.  Her wound is completely healed.  No drainage.  Her preoperative hardware pain is completely resolved.  Her only complaint today is diminished sensation about the sural nerve distribution on the lateral aspect of the foot.  She is very happy with her outcome.  ROS: All other systems have been reviewed and are negative except as previously noted in history of present illness.       IMP:  Problem List Items Addressed This Visit         Closed fracture of tibial plafond with fibula involvement with routine healing     Relevant Orders     XR ankle left 3+ views            Objective  General: Alert and oriented x 3, NAD, respirations easy and unlabored with no audible wheezes, skin warm and dry, speech and dress appropriate for noted age, affect euthymic.      Musculoskeletal: Left Ankle  incisions c/d/i  No swelling at bimalleolar ankle  compartments soft  no calf tenderness  sensation intact to light touch  motor intact including TA/GS/EHL  palpable DP/PT pulses 2+   Good range of motion of the ankle.  No tenderness to palpation along the incision about the ankle.  X-ray: Images of left ankle reviewed personally by me today and reveals interval change of removed tibia and fibula hardware. Ankle mortise is intact.            Assessment/Plan  Encounter Diagnoses:  Closed fracture of tibial plafond with fibula involvement with routine healing, unspecified laterality, subsequent encounter     PLAN: Patient is s/p left ankle removal of hardware.  Patient  has good function.  No pain.  We discussed the diminished sensation of the sural nerve distribution.  Will continue to observe.  He should improve over time.  At this point she will perform activities as tolerated with any restriction.  Given that she is doing well she will follow-up with me unless in the future.        Orders Placed This Encounter    XR ankle left 3+ views      No follow-ups on file.                  Electronically signed by Akil Collier MD at 5/7/2024 11:53 AM               Office Visit on 5/7/2024           Note shared with patient  Additional Documentation     Flowsheets: Interfaced Flowsheet Data   Encounter Info: Billing Info,     History,     Allergies      Orders Placed            XR ankle left 3+ views  Medication Changes        None  Medication List  Visit Diagnoses        Closed fracture of tibial plafond with fibula involvement with routine healing, unspecified laterality, subsequent encounter  Problem List               Electronically signed by Tacho Lynne MD at 10/10/2024  2:27 PM            Telemedicine on 10/10/2024           Note shared with patient  Additional Documentation     Flowsheets: Interfaced Flowsheet Data   Encounter Info: Billing Info,     History,     Allergies      Orders Placed     None  Medication Changes        None  Medication List  Visit Diagnoses         Cervical radiculitis     Lumbar disc disease  Problem List              Electronically signed by Tacho Lynne MD at 10/24/2024 10:09 AM         Office Visit on 10/24/2024          Note shared with patient  Additional Documentation    Flowsheets: Pain Assessment,     Pain Assessment   Encounter Info: Billing Info,     History,     Allergies     Orders Placed    Referral to Orthopaedic Surgery Authorized  Referral to Physical Therapy Pending Review  Medication Changes      None  Medication List  Visit Diagnoses      Lumbar radiculopathy  Problem List

## 2024-12-02 ENCOUNTER — APPOINTMENT (OUTPATIENT)
Dept: RHEUMATOLOGY | Facility: CLINIC | Age: 59
End: 2024-12-02
Payer: COMMERCIAL

## 2024-12-02 VITALS
DIASTOLIC BLOOD PRESSURE: 79 MMHG | WEIGHT: 195 LBS | SYSTOLIC BLOOD PRESSURE: 138 MMHG | TEMPERATURE: 97.3 F | HEIGHT: 67 IN | HEART RATE: 104 BPM | BODY MASS INDEX: 30.61 KG/M2

## 2024-12-02 DIAGNOSIS — M25.50 POLYARTHRALGIA: Primary | ICD-10-CM

## 2024-12-02 DIAGNOSIS — F41.9 ANXIETY: ICD-10-CM

## 2024-12-02 PROCEDURE — 3008F BODY MASS INDEX DOCD: CPT | Performed by: STUDENT IN AN ORGANIZED HEALTH CARE EDUCATION/TRAINING PROGRAM

## 2024-12-02 PROCEDURE — 4004F PT TOBACCO SCREEN RCVD TLK: CPT | Performed by: STUDENT IN AN ORGANIZED HEALTH CARE EDUCATION/TRAINING PROGRAM

## 2024-12-02 PROCEDURE — 99205 OFFICE O/P NEW HI 60 MIN: CPT | Performed by: STUDENT IN AN ORGANIZED HEALTH CARE EDUCATION/TRAINING PROGRAM

## 2024-12-02 ASSESSMENT — PAIN SCALES - GENERAL: PAINLEVEL_OUTOF10: 8

## 2024-12-02 NOTE — PROGRESS NOTES
Rheumatology Note      Patient Una Gallegos  MRN [id number]     Subjective    Subjective   History of Presenting Illness  Ms. Una Gallegos is a 59 y.o. female with PMHx of MS, TACHO, fibromylagia, and reported hx of SLE and Sjogren's presents for evaluation. Reports she was recently diagnosed with SLE by non- provider and started on HCQ 3 months ago. Was also diagnosed with Sjogren's and uses eye drops and [place] for dry eyes and dry mouth.     Has a history of chronic pain for which she follows with pain mgmt. Takes 800 mg Motrin, 5 mg Oxycodone daily, Gabapentin 800 TID. Describes constant aching in hands, knees, nick and back. Numbness and pain from buttocks down leg to back foot on L side. Numbness and tingling in L index finger. AM stiffness in hands over an hour. No change since starting HCQ. Frequently prescribed Prednisone for her back, does not improve her hand stiffness/pain.     Additional symptoms include fatigue, recurrent sores along inner cheeks and tongue, and rash over her arms, chest, and back. Describes a white scaly rash, non-pruritic. No photosensitivity. Says toes turn bale then blue in cold.      Has hx of anxiety and panic attacks. Not sleeping due to racing thoughts. Hx PTSD. No current or past  provider. Says she has trouble coping with worries about her family and stress over her health,     ROS:  Constitutional: Denies fever, chills  Head: Denies headaches or hair loss  Eyes: Denies blurry vision, redness of the eyes, pain in the eyes or H/O uveitis  ENT: Denies  loss of taste, nose bleed, or difficulty swallowing  Cardiovascular: Denies chest pain, palpitations  Respiratory: Denies shortness of breath or cough or wheezing  Gastrointestinal: Denies nausea, vomiting, heartburn, abdominal pain , diarrhea or blood in the stool  Genitourinary: No urinary urgency, frequency, dysuria   Integumentary: Denies photosensitivity  Hematologic/Lymphatic: Denies bleeding, bruising  MSK:  "As per HPI. No enthesitis, sausage finger, finger tip ulceration    Past Medical History:   Diagnosis Date    Asthma     Chronic headaches     Migraines    COVID-19     COVID + 5/2023    Depression     Diabetes mellitus (Multi)     10/23/23 A1C 6.0    Encounter for electrocardiogram 01/2024    EKG on 1/2/24    Fibromyalgia, primary     Fractures     Painful orthopaedic Closed fracture of left ankle with routine healing,    Gastritis     History of blood transfusion     Several years ago    Hyperlipidemia     Hypertension     Joint pain     Lupus     MS (multiple sclerosis) (Multi)     F/W Dr. Silvia Molina LOV 11/2023    Painful orthopaedic hardware (CMS-HCC)     Ortho: [facility]    Personal history of other diseases of the musculoskeletal system and connective tissue     Personal history of fibromyalgia    Seizure disorder (Multi)     Last occurence in 2020    Stroke (Multi)     CVA in 2020        No Known Allergies     Objective   Objective     /79   Pulse 104   Temp 36.3 °C (97.3 °F)   Ht 1.702 m (5' 7\")   Wt 88.5 kg (195 lb)   BMI 30.54 kg/m²      PHYSICAL EXAM:  General - NAD, AAOx3  Head: Normocephalic, atraumatic  Eyes - PERRLA, EOMI. No conjunctiva injection.   Mouth/ENT - Moist oral and nasal mucosa. No facial rash. No enlarged parotid or submandibular gland  Cardiovascular - Regular rate and rhythm. No murmurs or rubs.  Lungs - Clear to auscultation bilaterally.   Skin - No rashes or ulcers. No erythema on bilateral cheeks.  Abdomen - Soft, non-tender. No masses.   Extremities - No edema, cyanosis ,or clubbing  Neurological - Alert and oriented x 3,  grossly intact. No focal deficit.    Musculoskeletal: diffuse myofascial TTP   Shoulders: Full ROM, TTP b/l   Elbows: Full ROM, no swelling, warmth   Wrists: Full ROM, no swelling, warmth   MCP: No swelling, warmth. Metacarpal squeeze negative  PIP: No swelling, warmth or tenderness.  DIP: No swelling, warmth or tenderness.  Hands : 5/5. " "   Hips: lateral TTP b/l Full ROM.  No malalignment.  Knees:  Full ROM, without pain, no swelling, warmth or point tenderness. No joint line tenderness, no pes anserine tenderness.  Ankles: Full ROM, without pain, swelling, warmth or tenderness.  Toes: No swelling, warmth or tenderness. Metatarsal squeeze negative    LBS:  Lab Results   Component Value Date    WBC 9.5 10/23/2024    HGB 11.3 (L) 10/23/2024    HCT 38.8 10/23/2024    MCV 92 10/23/2024     (L) 10/23/2024      Lab Results   Component Value Date    GLUCOSE 61 (L) 10/23/2024    CO2 24 10/23/2024    BUN 26 (H) 10/23/2024    EGFR 69 10/23/2024    CALCIUM 9.4 10/23/2024    ALBUMIN 4.3 10/23/2024      Lab Results   Component Value Date    CRP 0.18 06/10/2022    CRP 1.33 (A) 06/02/2021    CRP 1.29 (A) 06/01/2021    CRP 0.23 08/26/2019     Lab Results   Component Value Date    SEDRATE 20 06/10/2022    SEDRATE 9 11/04/2021    SEDRATE 18 04/07/2021    SEDRATE 14 08/26/2019    SEDRATE 20 09/01/2018     No results found for: \"C3\", \"C4\"  Lab Results   Component Value Date    RF <10 11/04/2021     No results found for: \"ANATITER\", \"ARNP\", \"ASMRN\", \"ASSA\", \"ASSB\", \"ASCL\", \"JO1\", \"ACHR\", \"ACEN\", \"RIBPP\", \"DNADS\", \"ANCPA\", \"ANCTI\", \"PR3\", \"MPO\"  Lab Results   Component Value Date    PROTUR NEGATIVE 10/23/2024    GLUCOSEU Normal 10/23/2024    BLOODU NEGATIVE 10/23/2024    KETONESU NEGATIVE 10/23/2024    NITRITEU NEGATIVE 10/23/2024    LEUKOCYTESU NEGATIVE 10/23/2024     No results found for: \"UTPCR\"     Lab Results   Component Value Date    URICACID 4.6 11/04/2021     No results found for: \"COLORFL\", \"CLARITYFLUID\", \"WBCFL\", \"NEUTROBFREL\", \"LYMPHSBFREL\", \"EOSBFREL\", \"BASOBFREL\", \"PLASMACFLD\", \"RBCFL\", \"CRYSFL\"    IMAGING:  === 10/23/24 ===    CT BRAIN ATTACK ANGIO HEAD AND NECK W AND WO IV CONTRAST    - Impression -  CTA neck:    There is occlusion of the proximal left vertebral artery with  reconstitution at the C3 level.    Otherwise, no evidence for significant " stenosis of the cervical  vessels.    CTA head:    No evidence for significant stenosis or large branch vessel cutoffs  of the intracranial vessels.    There is atherosclerotic calcification along the cavernous segments  of the carotid arteries bilaterally.    MACRO:  None    Signed by: Rosana Perdmoo 10/23/2024 6:33 PM  Dictation workstation:   [id number]    ___________________________________________________________________________    CT BRAIN ATTACK HEAD WO CONTRAST    - Impression -  No acute intracranial hemorrhage or mass effect.    Moderate nonspecific white matter hypodensity compatible with  microangiopathy.    Old infarct within the left occipital lobe.      I personally reviewed the images/study and I agree with the findings  as stated by resident Reji Cervantes. This study was interpreted  at [place], [place].    MACRO:  None    Alert Reji Cervantes discussed the significance and urgency of this  critical finding at the scanner with  MARIE VILCHIS on 10/23/2024  at 6:03 pm.  (**-RCF-**) Findings:  See findings.    Signed by: Rosana Perdomo 10/23/2024 6:14 PM  Dictation workstation:   [id number]      === 10/13/24 ===    CT HEAD WO IV CONTRAST    - Impression -  1. No acute intracranial hemorrhage or mass effect.  2. Nonspecific white matter changes and sequela of small remote  ischemic infarcts, similar to prior imaging  3. Postsurgical changes of paranasal sinuses.      I personally reviewed the images/study and I agree with the findings  as stated by Dr. Lakhwinder Ladd. This study was interpreted at [place], [place].    MACRO:  None    Signed by: Romy Daugherty 10/14/2024 3:50 AM  Dictation workstation:   [id number]    ___________________________________________________________________________    CT ANGIO CHEST FOR PULMONARY EMBOLISM    - Impression -  1. No pulmonary embolism identified to the level of the  segmental  arteries. Small subsegmental filling defects can not be excluded.  2. Bandlike opacities through the dependent portions of the lower  lobes and lingula likely reflecting atelectasis or scarring.    MACRO:  None    Signed by: Malcolm Geller 10/14/2024 1:04 AM  Dictation workstation:   [id number]      === 01/02/24 ===    CT ABDOMEN PELVIS W IV CONTRAST    - Impression -  1.  Mild fluid distention of the stomach multiple distal small loops  within right lower quadrant/pelvis, possibly representing mild  enteritis. No bowel obstruction. No focal inflammatory changes  2. Nonobstructing left-sided renal calculus.  3. Additional chronic findings as above.    I personally reviewed the images/study and I agree with the findings  as stated. This study was interpreted at [place], [place].    MACRO:  None    Signed by: Catracho Morrow 1/2/2024 10:58 PM  Dictation workstation:   [id number]      === 11/30/23 ===    CT THORACIC SPINE WO IV CONTRAST    - Impression -  Thoracic spine: No acute fracture or malalignment.  Minimal  degenerative changes.  Lumbar spine: No acute fracture or malalignment.  Degenerative changes  in the lower lumbar spine greater at L4-5 and L5-S1.  Findings would  be better evaluated with MRI.  4 mm nonobstructive calculus upper pole left kidney.  Signed by Kaden Bruce, DO    ___________________________________________________________________________    CT LUMBAR SPINE WO IV CONTRAST    - Impression -  Thoracic spine: No acute fracture or malalignment.  Minimal  degenerative changes.  Lumbar spine: No acute fracture or malalignment.  Degenerative changes  in the lower lumbar spine greater at L4-5 and L5-S1.  Findings would  be better evaluated with MRI.  4 mm nonobstructive calculus upper pole left kidney.  Signed by Kaden Bruce, DO    ___________________________________________________________________________    CT CERVICAL SPINE WO  IV CONTRAST    - Impression -  1.  No CT evidence of acute fracture.  2.  Postoperative and degenerative changes throughout the cervical  spine, as above.    Signed by: James Parry 11/30/2023 2:12 PM  Dictation workstation:   [id number]      === 09/05/23 ===    CT CERVICAL SPINE WO IV CONTRAST    - Impression -  Postoperative changes are again identified compatible with a previous  posterior laminectomies at the C4 through C6 levels. There is again  evidence of posterior-lateral orthopedic fixation hardware extending  from the C4 through C6 levels. There is mild bony lucency surrounding  the left C6 orthopedic screw raising the possibility of a component  of loosening. The left C6 orthopedic screw is noted to extend along  the lateral margin of the C6/7 facet joint.    There is multilevel spondylosis within the cervical and visualized  upper thoracic region as described above.    MACRO:  None      === 01/30/22 ===    CT 3D RECONSTRUCTION    - Impression -  1. Redemonstration of trimalleolar fracture deformity status post  external fixation with improved alignment in the comminuted lateral  malleolar fracture deformity. The hardware is intact.  2. Redemonstration of bimalleolar hematoma, similar to the prior.    ___________________________________________________________________________    CT LOWER EXT WO CONTRAST    - Impression -  1. Redemonstration of trimalleolar fracture deformity status post  external fixation with improved alignment in the comminuted lateral  malleolar fracture deformity. The hardware is intact.  2. Redemonstration of bimalleolar hematoma, similar to the prior.      === 01/29/22 ===    CT LOWER EXT WO CONTRAST    - Impression -  Interval closed reduction with improved alignment of a comminuted  complex trimalleolar fracture as detailed.      === 01/13/22 ===    CT NECK ANGIO W AND WO IV CONTRAST    - Impression -  1. Redemonstration of nonvisualization of the left vertebral artery  from its  origin with reconstitution at approximately the C3-C4 level,  suggestive of chronic occlusion. This finding appears stable compared  to the prior CT study from 03/04/2021. There is persistent mild  irregularity and narrowing of the intracranial segment.  2. Redemonstration of at least moderate narrowing of the P2 segment  of the left PCA.  3. Multifocal mild irregularity and mild narrowing of the A2 and  distal ANN branches and M2 and distal MCA branches bilaterally.  4. Stable suspected small aneurysm arising from the cavernous segment  of the left ICA measuring 2 mm. There is also stable appearance of  mild fusiform dilatation of the paraophthalmic segment of the right  ICA.  5. Stable areas of encephalomalacia in the left occipital lobe and  right lentiform nucleus, consistent with sequelae of remote  infarctions.    I personally reviewed the image(s) / study and resident's  interpretation. I agree with the findings as stated. This study was  interpreted at [place], [place].    ___________________________________________________________________________    CT HEAD ANGIO W AND WO IV CONTRAST    - Impression -  1. Redemonstration of nonvisualization of the left vertebral artery  from its origin with reconstitution at approximately the C3-C4 level,  suggestive of chronic occlusion. This finding appears stable compared  to the prior CT study from 03/04/2021. There is persistent mild  irregularity and narrowing of the intracranial segment.  2. Redemonstration of at least moderate narrowing of the P2 segment  of the left PCA.  3. Multifocal mild irregularity and mild narrowing of the A2 and  distal ANN branches and M2 and distal MCA branches bilaterally.  4. Stable suspected small aneurysm arising from the cavernous segment  of the left ICA measuring 2 mm. There is also stable appearance of  mild fusiform dilatation of the paraophthalmic segment of the right  ICA.  5. Stable areas of  encephalomalacia in the left occipital lobe and  right lentiform nucleus, consistent with sequelae of remote  infarctions.    I personally reviewed the image(s) / study and resident's  interpretation. I agree with the findings as stated. This study was  interpreted at [facility], [place], [place].      === 09/20/21 ===    CT CHEST PULMONARY EMBOLISM W IV CONTRAST    - Impression -  Mild bibasilar atelectasis without distinct consolidation or pulmonary  edema.    No evidence of pulmonary embolism.      Signed by Pineda Miranda MD    ___________________________________________________________________________    CT HEAD WO IV CONTRAST    - Impression -  1. No acute intracranial hemorrhage or acute territorial infarct. If  there is persistent clinical concern, MRI can be obtained for further  evaluation.  2. Interval development of an area of encephalomalacia in the left  occipital lobe consistent with history of remote left PCA territory  infarct.  3. Unchanged appearance of a round hypodensity in the right lentiform  nucleus when compared to prior CT dated 03/20/2021, likely  representing a remote lacunar infarct.          I personally reviewed the images/study and I agree with the findings  as stated.      === 05/25/21 ===    CT HEART CALCIUM SCORING WO IV CONTRAST    - Impression -  1. Coronary artery calcium score of 44 *.  2. A new 7 mm ground-glass nodular density within left lower lobe.  While, this may represent focal atelectasis, attention on follow-up  noncontrast chest CT within 6-12 months is recommended to ensure  stability.  (Enrique Fournierhonoah et al., Guidelines for management of  incidental pulmonary nodules detected on CT images: From the  Fleischner Society 2017, Radiology. 2017 Jul;284 (1):228-243.)    *Coronary Artery Calcium Gated and Nongated Agatston score  Score                                      Risk  0                                       Very low  1-99                                   Mildly increased  100-299                             Moderately increased  >300                                Moderate to severely increased    Deedee et al. JCCT 2016 (http://dx.doi.org/10.1016/j.jcct.2016.11.003)    PULLIAM 10-Year CHD Risk with Coronary Artery Calcification can be  calcuated using link below  [email or web address]  Reed brennan al. JACC 2015 (http://dx.doi.org/10.1016/j.j  acc.2015.08.035)      === 03/04/21 ===    CT BRAIN ATTACK    - Impression -  1. Nonvisualization of the left vertebral artery from its origin with  reconstitution at the approximate C3-3-4 level suggestive of markedly  diminished flow or at least partial occlusion. Underlying dissection  cannot be excluded. There is mild irregularity of the intracranial  vertebral arteries bilaterally. The right PCA appears to arise  predominantly from the posterior communicating artery. The PCAs are  otherwise somewhat difficult to assess due to prominent venous  enhancement. There is likely at least irregularity and narrowing of  the P2 segment on the left.  2. Multifocal mild irregularity and potential narrowing of A2 and  distal ACAs and M2 and distal MCA branches bilaterally. At least some  of this may be due to artifact, however.  3. Suspected tiny aneurysm arising from the cavernous segment of the  left ICA. Additionally, there is fusiform dilatation of the  paraophthalmic segment of the right ICA.      Document Only:  The critical information above was relayed directly by me by  telephone to the emergency department physician caring for the  patient on 3/4/2021 at 3:06 pm with readback verification.    ___________________________________________________________________________    CT ANGIO BRAIN ATTACK HEAD W IV CONTRAST AND POST PROCEDURE    - Impression -  1. Nonvisualization of the left vertebral artery from its origin with  reconstitution at the approximate C3-3-4 level suggestive of  markedly  diminished flow or at least partial occlusion. Underlying dissection  cannot be excluded. There is mild irregularity of the intracranial  vertebral arteries bilaterally. The right PCA appears to arise  predominantly from the posterior communicating artery. The PCAs are  otherwise somewhat difficult to assess due to prominent venous  enhancement. There is likely at least irregularity and narrowing of  the P2 segment on the left.  2. Multifocal mild irregularity and potential narrowing of A2 and  distal ACAs and M2 and distal MCA branches bilaterally. At least some  of this may be due to artifact, however.  3. Suspected tiny aneurysm arising from the cavernous segment of the  left ICA. Additionally, there is fusiform dilatation of the  paraophthalmic segment of the right ICA.      Document Only:  The critical information above was relayed directly by me by  telephone to the emergency department physician caring for the  patient on 3/4/2021 at 3:06 pm with readback verification.    ___________________________________________________________________________    CT BRAIN ATTACK HEAD WO CONTRAST    - Impression -  Subtle hypodensity in the left occipital lobe on the angled reformats  conceivably may be due to partial volume averaging. Age-indeterminate  ischemic injury among others could also give this appearance. MRI may  be of benefit for further characterization.    Potential lacunar infarct of the right lentiform nucleus.      Document Only:  The critical information above was relayed directly by me by  telephone to TIM OCHOA on 3/4/2021 at 2:37 pm with readback  verification.      === 11/03/20 ===    CT HEAD WO IV CONTRAST    - Impression -  .  Normal CT scan of the brain.  No abnormality identified.      Signed by Vic May      === 03/13/20 ===    CT CERVICAL SPINE WO CONTRAST    - Impression -  1. No acute intracranial abnormality.  2. No calvarial fracture.  3. No acute fracture or traumatic  malalignment of the cervical spine.  4. Status post C4-C6 posterior cervical spinal fusion and  laminectomies without evidence of hardware complication. Collection  in the postsurgical bed is likely a postoperative seroma.  Superinfection is not excluded.    I personally reviewed the images/study and I agree with the findings  as stated. This study was interpreted at [place], [place].    ___________________________________________________________________________    CT HEAD WO CONTRAST    - Impression -  1. No acute intracranial abnormality.  2. No calvarial fracture.  3. No acute fracture or traumatic malalignment of the cervical spine.  4. Status post C4-C6 posterior cervical spinal fusion and  laminectomies without evidence of hardware complication. Collection  in the postsurgical bed is likely a postoperative seroma.  Superinfection is not excluded.    I personally reviewed the images/study and I agree with the findings  as stated. This study was interpreted at [place], [place].      === 11/22/19 ===    CT HEAD WO CONTRAST    - Impression -  No acute intracranial bleed or territorial stroke.      Signed by MAE Bruce, DO      === 09/17/18 ===    CT CHEST PULMONARY EMBOLISM W IV CONTRAST    - Impression -  1. No evidence of acute pulmonary embolism.  2. Trace right-sided pleural effusion with bandlike bibasilar  atelectasis. There is also lingular bandlike atelectasis.  I personally reviewed the images/study and I agree with the findings  as stated. This study was interpreted at [place], [place].      === 09/14/18 ===    CT HEAD WO CONTRAST    - Impression -  No evidence of acute intracranial abnormality.    Patient is post interval endoscopic sinus surgery since 07/29/2018.  There is moderate polypoid mucosal thickening in the left frontal  sinus. The right frontal sinus  is grossly clear. In the remainder  remainder of the paranasal sinuses there is a combination of moderate  mucosal thickening and hyperattenuating fluid with subtotal  opacification of the sinus cavities. Findings raise suspicion for  acute on chronic sinusitis; the hyper attenuation could relate to  intra sinus hemorrhage, inspissated secretions or chronic allergic  fungal sinusitis.    I personally reviewed the images/study and I agree with the findings  as stated. This study was interpreted at [facility], [place], [place].     Assessment    Assessment & Plan   Ms. Una Gallegos is a 59 y.o. female who presents with with PMHx of MS, TACHO, fibromylagia, and reported hx of SLE and Sjogren's presents for evaluation.    Uncertain of current diagnoses as no positive GENOVEVA or additional lab evidence of SLE found on chart review. Her reported symptoms could also be explained by her other medical history including hx of MS and fibromyalgia. She notes no improvement or change with Prednisone or since being on HCQ which further supports an alternative explanation. Discussed this with her and our plan to recheck labs including an GENOVEVA. Will continue HCQ for now pending the remainder of her workup.     Additionally discussed how fibromyalgia may be at least contributing to her active pain symptoms and recommended she engage with a mental health provider to help address her ongoing stress and anxiety. She is open to this and requested a referral.     Plan:  -check CBC, CMP, GENOVEVA w/CORTNEY, C3 and C4   -continue HCQ for now  -continue medications per pain mgmt  -keep upcoming appt with ortho spine regarding recent MRI and ongoing back pain   -referral to psychology placed          Independently reviewed three or more labs  Images including CXR, CT, MRI independently reviewed.       Case seen and discussed with Dr. Sewell   Signature: Jenna Jones, DO  Date: December 2, 2024        Jenna Jones,  DO 12/02/24 9:50 AM

## 2024-12-10 ENCOUNTER — OFFICE VISIT (OUTPATIENT)
Dept: ORTHOPEDIC SURGERY | Facility: CLINIC | Age: 59
End: 2024-12-10
Payer: COMMERCIAL

## 2024-12-10 VITALS — WEIGHT: 195 LBS | HEIGHT: 67 IN | BODY MASS INDEX: 30.61 KG/M2

## 2024-12-10 DIAGNOSIS — M54.16 LUMBAR RADICULOPATHY: ICD-10-CM

## 2024-12-10 PROCEDURE — 99213 OFFICE O/P EST LOW 20 MIN: CPT | Performed by: ORTHOPAEDIC SURGERY

## 2024-12-10 PROCEDURE — 99243 OFF/OP CNSLTJ NEW/EST LOW 30: CPT | Performed by: ORTHOPAEDIC SURGERY

## 2024-12-10 PROCEDURE — 3008F BODY MASS INDEX DOCD: CPT | Performed by: ORTHOPAEDIC SURGERY

## 2024-12-10 ASSESSMENT — PAIN - FUNCTIONAL ASSESSMENT: PAIN_FUNCTIONAL_ASSESSMENT: 0-10

## 2024-12-10 NOTE — PROGRESS NOTES
HPI:Una Gallegos very pleasant 59-year-old woman with a past medical history significant for multiple sclerosis which she has had for 10 years.  She comes in with more recent right leg pain for the last 2 months.  It affects her ability to get around.  She has numbness and tingling.  She has seen pain management.  She is scheduled to start physical therapy on December 20.      ROS:  Reviewed on EMR and patient intake sheet.    PMH/SH:  Reviewed on EMR and patient intake sheet.    Exam:  Physical Exam    Constitutional: Well appearing; no acute distress  Eyes: pupils are equal and round  Psych: normal affect  Respiratory: non-labored breathing  Cardiovascular: regular rate and rhythm  GI: non-distended abdomen  Musculoskeletal: no pain with range of motion of the hips bilaterally  Neurologic: [3+]/5 strength in the lower extremities bilaterally]; [positive right] straight leg raise    Radiology:     MRI demonstrates a right paracentral L5-S1 disc herniation with displacement of the traversing S1 nerve root    Diagnosis:    Lumbar radiculopathy; L5-S1 hernia nucleus pulposus    Assessment and Plan:   59-year-old woman with an acute lumbar radiculopathy secondary to an L5-S1 disc herniation.  At this time I explained to her the course of treatment required.  Will begin with physical therapy.  If patient has persistent symptoms despite therapy, then surgical intervention would be the last step.  In the interim, a steroid injection would also be helpful for her acute radicular pain.  I have referred her back to her pain management provider for the injection.  The patient will be in contact with me, if the symptoms persist despite therapy and injections.  At that point a microdiscectomy would be appropriate.    The patient was in agreement with the plan. At the end of the visit today, the patient felt that all questions had been answered satisfactorily.  The patient was pleased with the visit and very appreciative  for the care rendered.     Thank you very much for the kind referral.  It is a privilege, and a pleasure, to partner with you in the care of your patients.  I would be delighted to assist you with any further consultations as needed.          Mj Lennon MD    Chief of Spine Surgery, [facility]  Director of Spine Service, [facility]  , Department of Orthopaedics  [facility]  [address] [address] [place] [id number]  P: [phone]  [email or web address]    This note was dictated with voice recognition software.  It has not been proofread for grammatical errors, typographical mistakes or other semantic inconsistencies.

## 2024-12-10 NOTE — LETTER
December 10, 2024     Scott Stokes MD  Office Address Unavailable  As Of 07/01/2024    Patient: Una Gallegos   YOB: 1965   Date of Visit: 12/10/2024       Dear Dr. Scott Stokes MD:    Thank you for referring Una Gallegos to me for evaluation. Below are my notes for this consultation.  If you have questions, please do not hesitate to call me. I look forward to following your patient along with you.       Sincerely,     Mj Lennon MD      CC: Tacho Lynne MD  ______________________________________________________________________________________    HPI:Una Gallegos very pleasant 59-year-old woman with a past medical history significant for multiple sclerosis which she has had for 10 years.  She comes in with more recent right leg pain for the last 2 months.  It affects her ability to get around.  She has numbness and tingling.  She has seen pain management.  She is scheduled to start physical therapy on December 20.      ROS:  Reviewed on EMR and patient intake sheet.    PMH/SH:  Reviewed on EMR and patient intake sheet.    Exam:  Physical Exam    Constitutional: Well appearing; no acute distress  Eyes: pupils are equal and round  Psych: normal affect  Respiratory: non-labored breathing  Cardiovascular: regular rate and rhythm  GI: non-distended abdomen  Musculoskeletal: no pain with range of motion of the hips bilaterally  Neurologic: [3+]/5 strength in the lower extremities bilaterally]; [positive right] straight leg raise    Radiology:     MRI demonstrates a right paracentral L5-S1 disc herniation with displacement of the traversing S1 nerve root    Diagnosis:    Lumbar radiculopathy; L5-S1 hernia nucleus pulposus    Assessment and Plan:   59-year-old woman with an acute lumbar radiculopathy secondary to an L5-S1 disc herniation.  At this time I explained to her the course of treatment required.  Will begin with physical therapy.  If patient has persistent symptoms despite  therapy, then surgical intervention would be the last step.  In the interim, a steroid injection would also be helpful for her acute radicular pain.  I have referred her back to her pain management provider for the injection.  The patient will be in contact with me, if the symptoms persist despite therapy and injections.  At that point a microdiscectomy would be appropriate.    The patient was in agreement with the plan. At the end of the visit today, the patient felt that all questions had been answered satisfactorily.  The patient was pleased with the visit and very appreciative for the care rendered.     Thank you very much for the kind referral.  It is a privilege, and a pleasure, to partner with you in the care of your patients.  I would be delighted to assist you with any further consultations as needed.          Mj Lennon MD    Chief of Spine Surgery, [facility]  Director of Spine Service, [facility]  , Department of Orthopaedics  [facility] [employer]  [address] [place] [address]  P: [phone]  [facility].[email or web address]    This note was dictated with voice recognition software.  It has not been proofread for grammatical errors, typographical mistakes or other semantic inconsistencies.

## 2024-12-11 ENCOUNTER — HOSPITAL ENCOUNTER (INPATIENT)
Facility: HOSPITAL | Age: 59
LOS: 2 days | Discharge: NURSING, REHAB OR LONG-TERM CARE | End: 2024-12-14
Attending: EMERGENCY MEDICINE | Admitting: INTERNAL MEDICINE

## 2024-12-11 ENCOUNTER — APPOINTMENT (OUTPATIENT)
Dept: RADIOLOGY | Facility: HOSPITAL | Age: 59
End: 2024-12-11

## 2024-12-11 DIAGNOSIS — M51.26 LUMBAR DISC HERNIATION: Primary | ICD-10-CM

## 2024-12-11 DIAGNOSIS — M54.50 CHRONIC LOW BACK PAIN, UNSPECIFIED BACK PAIN LATERALITY, UNSPECIFIED WHETHER SCIATICA PRESENT: ICD-10-CM

## 2024-12-11 DIAGNOSIS — G89.29 CHRONIC LOW BACK PAIN, UNSPECIFIED BACK PAIN LATERALITY, UNSPECIFIED WHETHER SCIATICA PRESENT: ICD-10-CM

## 2024-12-11 DIAGNOSIS — N17.9 AKI (ACUTE KIDNEY INJURY) (CMS-HCC): ICD-10-CM

## 2024-12-11 LAB
ALBUMIN SERPL BCP-MCNC: 4.1 G/DL (ref 3.4–5)
ALP SERPL-CCNC: 70 U/L (ref 33–110)
ALT SERPL W P-5'-P-CCNC: 23 U/L (ref 7–45)
ANION GAP SERPL CALC-SCNC: 13 MMOL/L (ref 10–20)
AST SERPL W P-5'-P-CCNC: 22 U/L (ref 9–39)
BASOPHILS # BLD AUTO: 0.05 X10*3/UL (ref 0–0.1)
BASOPHILS NFR BLD AUTO: 0.4 %
BILIRUB SERPL-MCNC: 0.5 MG/DL (ref 0–1.2)
BUN SERPL-MCNC: 22 MG/DL (ref 6–23)
CALCIUM SERPL-MCNC: 8.9 MG/DL (ref 8.6–10.6)
CHLORIDE SERPL-SCNC: 107 MMOL/L (ref 98–107)
CO2 SERPL-SCNC: 28 MMOL/L (ref 21–32)
CREAT SERPL-MCNC: 1.11 MG/DL (ref 0.5–1.05)
EGFRCR SERPLBLD CKD-EPI 2021: 57 ML/MIN/1.73M*2
EOSINOPHIL # BLD AUTO: 0.14 X10*3/UL (ref 0–0.7)
EOSINOPHIL NFR BLD AUTO: 1.2 %
ERYTHROCYTE [DISTWIDTH] IN BLOOD BY AUTOMATED COUNT: 14.3 % (ref 11.5–14.5)
GLUCOSE SERPL-MCNC: 115 MG/DL (ref 74–99)
HCT VFR BLD AUTO: 37.2 % (ref 36–46)
HGB BLD-MCNC: 11.5 G/DL (ref 12–16)
IMM GRANULOCYTES # BLD AUTO: 0.07 X10*3/UL (ref 0–0.7)
IMM GRANULOCYTES NFR BLD AUTO: 0.6 % (ref 0–0.9)
LYMPHOCYTES # BLD AUTO: 3.42 X10*3/UL (ref 1.2–4.8)
LYMPHOCYTES NFR BLD AUTO: 30.4 %
MAGNESIUM SERPL-MCNC: 2.39 MG/DL (ref 1.6–2.4)
MCH RBC QN AUTO: 26.7 PG (ref 26–34)
MCHC RBC AUTO-ENTMCNC: 30.9 G/DL (ref 32–36)
MCV RBC AUTO: 86 FL (ref 80–100)
MONOCYTES # BLD AUTO: 1.19 X10*3/UL (ref 0.1–1)
MONOCYTES NFR BLD AUTO: 10.6 %
NEUTROPHILS # BLD AUTO: 6.37 X10*3/UL (ref 1.2–7.7)
NEUTROPHILS NFR BLD AUTO: 56.8 %
NRBC BLD-RTO: 0 /100 WBCS (ref 0–0)
PLATELET # BLD AUTO: 273 X10*3/UL (ref 150–450)
POTASSIUM SERPL-SCNC: 4.2 MMOL/L (ref 3.5–5.3)
PROT SERPL-MCNC: 6.7 G/DL (ref 6.4–8.2)
RBC # BLD AUTO: 4.31 X10*6/UL (ref 4–5.2)
SODIUM SERPL-SCNC: 144 MMOL/L (ref 136–145)
WBC # BLD AUTO: 11.2 X10*3/UL (ref 4.4–11.3)

## 2024-12-11 PROCEDURE — 83735 ASSAY OF MAGNESIUM: CPT

## 2024-12-11 PROCEDURE — 85025 COMPLETE CBC W/AUTO DIFF WBC: CPT | Performed by: EMERGENCY MEDICINE

## 2024-12-11 PROCEDURE — 80053 COMPREHEN METABOLIC PANEL: CPT | Performed by: EMERGENCY MEDICINE

## 2024-12-11 PROCEDURE — 72156 MRI NECK SPINE W/O & W/DYE: CPT

## 2024-12-11 PROCEDURE — 93010 ELECTROCARDIOGRAM REPORT: CPT | Performed by: EMERGENCY MEDICINE

## 2024-12-11 PROCEDURE — 72157 MRI CHEST SPINE W/O & W/DYE: CPT

## 2024-12-11 PROCEDURE — 72158 MRI LUMBAR SPINE W/O & W/DYE: CPT

## 2024-12-11 PROCEDURE — 36415 COLL VENOUS BLD VENIPUNCTURE: CPT | Performed by: EMERGENCY MEDICINE

## 2024-12-11 PROCEDURE — 2500000004 HC RX 250 GENERAL PHARMACY W/ HCPCS (ALT 636 FOR OP/ED)

## 2024-12-11 PROCEDURE — 99285 EMERGENCY DEPT VISIT HI MDM: CPT | Mod: 25 | Performed by: EMERGENCY MEDICINE

## 2024-12-11 PROCEDURE — 96374 THER/PROPH/DIAG INJ IV PUSH: CPT

## 2024-12-11 PROCEDURE — 99285 EMERGENCY DEPT VISIT HI MDM: CPT | Performed by: EMERGENCY MEDICINE

## 2024-12-11 RX ORDER — HYDROMORPHONE HYDROCHLORIDE 1 MG/ML
1 INJECTION, SOLUTION INTRAMUSCULAR; INTRAVENOUS; SUBCUTANEOUS ONCE
Status: COMPLETED | OUTPATIENT
Start: 2024-12-11 | End: 2024-12-11

## 2024-12-11 RX ORDER — LIDOCAINE 560 MG/1
1 PATCH PERCUTANEOUS; TOPICAL; TRANSDERMAL DAILY
Status: DISCONTINUED | OUTPATIENT
Start: 2024-12-12 | End: 2024-12-12

## 2024-12-11 RX ADMIN — HYDROMORPHONE HYDROCHLORIDE 1 MG: 1 INJECTION, SOLUTION INTRAMUSCULAR; INTRAVENOUS; SUBCUTANEOUS at 23:03

## 2024-12-11 ASSESSMENT — PAIN SCALES - GENERAL
PAINLEVEL_OUTOF10: 10 - WORST POSSIBLE PAIN
PAINLEVEL_OUTOF10: 9

## 2024-12-11 ASSESSMENT — PAIN DESCRIPTION - LOCATION
LOCATION: BACK
LOCATION: BACK

## 2024-12-11 ASSESSMENT — PAIN - FUNCTIONAL ASSESSMENT: PAIN_FUNCTIONAL_ASSESSMENT: 0-10

## 2024-12-11 ASSESSMENT — PAIN DESCRIPTION - PAIN TYPE: TYPE: ACUTE PAIN

## 2024-12-11 ASSESSMENT — PAIN DESCRIPTION - ORIENTATION: ORIENTATION: LOWER

## 2024-12-11 NOTE — ED TRIAGE NOTES
Hx MS, dx with cauda equina by orthopedics recently, says her R leg went numb, caused her to fall and strike head on counter, no LOC/thinners, c/o back and head pain

## 2024-12-12 ENCOUNTER — CLINICAL SUPPORT (OUTPATIENT)
Dept: EMERGENCY MEDICINE | Facility: HOSPITAL | Age: 59
End: 2024-12-12

## 2024-12-12 ENCOUNTER — APPOINTMENT (OUTPATIENT)
Dept: RADIOLOGY | Facility: HOSPITAL | Age: 59
End: 2024-12-12

## 2024-12-12 PROBLEM — R07.9 CHEST PAIN: Status: RESOLVED | Noted: 2023-01-23 | Resolved: 2024-12-12

## 2024-12-12 PROBLEM — G89.29 CHRONIC SCAPULAR PAIN: Status: RESOLVED | Noted: 2023-01-23 | Resolved: 2024-12-12

## 2024-12-12 PROBLEM — M51.9 LUMBAR DISC DISEASE: Status: RESOLVED | Noted: 2023-01-23 | Resolved: 2024-12-12

## 2024-12-12 PROBLEM — R51.9 HEADACHE: Status: RESOLVED | Noted: 2023-01-23 | Resolved: 2024-12-12

## 2024-12-12 PROBLEM — M25.60 JOINT STIFFNESS: Status: RESOLVED | Noted: 2023-01-23 | Resolved: 2024-12-12

## 2024-12-12 PROBLEM — N39.0 UTI (URINARY TRACT INFECTION): Status: RESOLVED | Noted: 2023-01-23 | Resolved: 2024-12-12

## 2024-12-12 PROBLEM — M51.26 LUMBAR DISC HERNIATION: Status: ACTIVE | Noted: 2024-12-12

## 2024-12-12 PROBLEM — B02.39 OTHER HERPES ZOSTER EYE DISEASE: Status: RESOLVED | Noted: 2023-01-23 | Resolved: 2024-12-12

## 2024-12-12 PROBLEM — B02.9 HERPES ZOSTER: Status: ACTIVE | Noted: 2023-01-23

## 2024-12-12 PROBLEM — M35.00 SJOGREN'S SYNDROME: Status: ACTIVE | Noted: 2023-12-08

## 2024-12-12 PROBLEM — M79.662 PAIN OF LEFT CALF: Status: RESOLVED | Noted: 2023-01-23 | Resolved: 2024-12-12

## 2024-12-12 PROBLEM — J30.2 SEASONAL ALLERGIC RHINITIS: Status: RESOLVED | Noted: 2023-01-23 | Resolved: 2024-12-12

## 2024-12-12 PROBLEM — I10 HTN (HYPERTENSION): Chronic | Status: ACTIVE | Noted: 2024-03-09

## 2024-12-12 PROBLEM — J34.2 NASAL SEPTAL DEVIATION: Status: RESOLVED | Noted: 2023-01-23 | Resolved: 2024-12-12

## 2024-12-12 PROBLEM — S82.872S: Status: RESOLVED | Noted: 2023-01-23 | Resolved: 2024-12-12

## 2024-12-12 PROBLEM — R19.7 DIARRHEA, UNSPECIFIED: Status: RESOLVED | Noted: 2023-01-23 | Resolved: 2024-12-12

## 2024-12-12 PROBLEM — M54.12 CERVICAL RADICULOPATHY: Status: RESOLVED | Noted: 2023-01-23 | Resolved: 2024-12-12

## 2024-12-12 PROBLEM — B37.0 THRUSH, ORAL: Status: RESOLVED | Noted: 2023-01-23 | Resolved: 2024-12-12

## 2024-12-12 PROBLEM — B95.8: Status: RESOLVED | Noted: 2023-01-23 | Resolved: 2024-12-12

## 2024-12-12 PROBLEM — G40.909 EPILEPSY: Chronic | Status: ACTIVE | Noted: 2024-03-09

## 2024-12-12 PROBLEM — M54.9 UPPER BACK PAIN ON LEFT SIDE: Status: RESOLVED | Noted: 2023-01-23 | Resolved: 2024-12-12

## 2024-12-12 PROBLEM — H04.129 DRY EYE: Status: RESOLVED | Noted: 2023-01-23 | Resolved: 2024-12-12

## 2024-12-12 PROBLEM — R39.9 UTI SYMPTOMS: Status: RESOLVED | Noted: 2023-01-23 | Resolved: 2024-12-12

## 2024-12-12 LAB
ABO GROUP (TYPE) IN BLOOD: NORMAL
AMPHETAMINES UR QL SCN: ABNORMAL
ANION GAP SERPL CALC-SCNC: 13 MMOL/L (ref 10–20)
ANTIBODY SCREEN: NORMAL
APTT PPP: 37 SECONDS (ref 27–38)
BARBITURATES UR QL SCN: ABNORMAL
BENZODIAZ UR QL SCN: ABNORMAL
BUN SERPL-MCNC: 26 MG/DL (ref 6–23)
BZE UR QL SCN: ABNORMAL
CALCIUM SERPL-MCNC: 8.4 MG/DL (ref 8.6–10.6)
CANNABINOIDS UR QL SCN: ABNORMAL
CHLORIDE SERPL-SCNC: 110 MMOL/L (ref 98–107)
CO2 SERPL-SCNC: 27 MMOL/L (ref 21–32)
CREAT SERPL-MCNC: 0.61 MG/DL (ref 0.5–1.05)
EGFRCR SERPLBLD CKD-EPI 2021: >90 ML/MIN/1.73M*2
FENTANYL+NORFENTANYL UR QL SCN: ABNORMAL
GLUCOSE SERPL-MCNC: 83 MG/DL (ref 74–99)
INR PPP: 1.1 (ref 0.9–1.1)
METHADONE UR QL SCN: ABNORMAL
OPIATES UR QL SCN: ABNORMAL
OXYCODONE+OXYMORPHONE UR QL SCN: ABNORMAL
PCP UR QL SCN: ABNORMAL
POTASSIUM SERPL-SCNC: 4 MMOL/L (ref 3.5–5.3)
PROTHROMBIN TIME: 11.9 SECONDS (ref 9.8–12.8)
RH FACTOR (ANTIGEN D): NORMAL
SODIUM SERPL-SCNC: 146 MMOL/L (ref 136–145)

## 2024-12-12 PROCEDURE — 86850 RBC ANTIBODY SCREEN: CPT

## 2024-12-12 PROCEDURE — 72157 MRI CHEST SPINE W/O & W/DYE: CPT | Performed by: RADIOLOGY

## 2024-12-12 PROCEDURE — 85610 PROTHROMBIN TIME: CPT

## 2024-12-12 PROCEDURE — 86901 BLOOD TYPING SEROLOGIC RH(D): CPT

## 2024-12-12 PROCEDURE — 2500000004 HC RX 250 GENERAL PHARMACY W/ HCPCS (ALT 636 FOR OP/ED): Performed by: NURSE PRACTITIONER

## 2024-12-12 PROCEDURE — 2500000004 HC RX 250 GENERAL PHARMACY W/ HCPCS (ALT 636 FOR OP/ED)

## 2024-12-12 PROCEDURE — 1210000001 HC SEMI-PRIVATE ROOM DAILY

## 2024-12-12 PROCEDURE — 51702 INSERT TEMP BLADDER CATH: CPT

## 2024-12-12 PROCEDURE — 80048 BASIC METABOLIC PNL TOTAL CA: CPT

## 2024-12-12 PROCEDURE — 71045 X-RAY EXAM CHEST 1 VIEW: CPT | Mod: FOREIGN READ | Performed by: RADIOLOGY

## 2024-12-12 PROCEDURE — 71045 X-RAY EXAM CHEST 1 VIEW: CPT

## 2024-12-12 PROCEDURE — 96376 TX/PRO/DX INJ SAME DRUG ADON: CPT

## 2024-12-12 PROCEDURE — 2500000002 HC RX 250 W HCPCS SELF ADMINISTERED DRUGS (ALT 637 FOR MEDICARE OP, ALT 636 FOR OP/ED): Performed by: NURSE PRACTITIONER

## 2024-12-12 PROCEDURE — 99223 1ST HOSP IP/OBS HIGH 75: CPT | Performed by: NURSE PRACTITIONER

## 2024-12-12 PROCEDURE — 2500000005 HC RX 250 GENERAL PHARMACY W/O HCPCS: Performed by: NURSE PRACTITIONER

## 2024-12-12 PROCEDURE — 72158 MRI LUMBAR SPINE W/O & W/DYE: CPT | Performed by: RADIOLOGY

## 2024-12-12 PROCEDURE — 93005 ELECTROCARDIOGRAM TRACING: CPT

## 2024-12-12 PROCEDURE — 2550000001 HC RX 255 CONTRASTS: Performed by: EMERGENCY MEDICINE

## 2024-12-12 PROCEDURE — 96375 TX/PRO/DX INJ NEW DRUG ADDON: CPT

## 2024-12-12 PROCEDURE — A9575 INJ GADOTERATE MEGLUMI 0.1ML: HCPCS | Performed by: EMERGENCY MEDICINE

## 2024-12-12 PROCEDURE — 99221 1ST HOSP IP/OBS SF/LOW 40: CPT | Performed by: ORTHOPAEDIC SURGERY

## 2024-12-12 PROCEDURE — 72156 MRI NECK SPINE W/O & W/DYE: CPT | Performed by: RADIOLOGY

## 2024-12-12 PROCEDURE — 2500000001 HC RX 250 WO HCPCS SELF ADMINISTERED DRUGS (ALT 637 FOR MEDICARE OP): Performed by: NURSE PRACTITIONER

## 2024-12-12 PROCEDURE — 80307 DRUG TEST PRSMV CHEM ANLYZR: CPT | Performed by: NURSE PRACTITIONER

## 2024-12-12 PROCEDURE — 36415 COLL VENOUS BLD VENIPUNCTURE: CPT

## 2024-12-12 RX ORDER — LIDOCAINE 560 MG/1
1 PATCH PERCUTANEOUS; TOPICAL; TRANSDERMAL DAILY
Status: DISCONTINUED | OUTPATIENT
Start: 2024-12-12 | End: 2024-12-14 | Stop reason: HOSPADM

## 2024-12-12 RX ORDER — DULAGLUTIDE 4.5 MG/.5ML
4.5 INJECTION, SOLUTION SUBCUTANEOUS WEEKLY
COMMUNITY

## 2024-12-12 RX ORDER — FOLIC ACID 1 MG/1
1 TABLET ORAL DAILY
Status: DISCONTINUED | OUTPATIENT
Start: 2024-12-12 | End: 2024-12-14 | Stop reason: HOSPADM

## 2024-12-12 RX ORDER — OXYBUTYNIN CHLORIDE 5 MG/1
5 TABLET ORAL 2 TIMES DAILY
Status: DISCONTINUED | OUTPATIENT
Start: 2024-12-12 | End: 2024-12-14 | Stop reason: HOSPADM

## 2024-12-12 RX ORDER — ENOXAPARIN SODIUM 100 MG/ML
40 INJECTION SUBCUTANEOUS EVERY 24 HOURS
Status: DISCONTINUED | OUTPATIENT
Start: 2024-12-12 | End: 2024-12-14 | Stop reason: HOSPADM

## 2024-12-12 RX ORDER — METFORMIN HYDROCHLORIDE 500 MG/1
500 TABLET ORAL
Status: DISCONTINUED | OUTPATIENT
Start: 2024-12-13 | End: 2024-12-14 | Stop reason: HOSPADM

## 2024-12-12 RX ORDER — HYDROMORPHONE HYDROCHLORIDE 1 MG/ML
0.5 INJECTION, SOLUTION INTRAMUSCULAR; INTRAVENOUS; SUBCUTANEOUS ONCE
Status: COMPLETED | OUTPATIENT
Start: 2024-12-12 | End: 2024-12-12

## 2024-12-12 RX ORDER — ACETAMINOPHEN 325 MG/1
975 TABLET ORAL ONCE
Status: DISCONTINUED | OUTPATIENT
Start: 2024-12-12 | End: 2024-12-12

## 2024-12-12 RX ORDER — OXYCODONE AND ACETAMINOPHEN 5; 325 MG/1; MG/1
1 TABLET ORAL 2 TIMES DAILY PRN
COMMUNITY
Start: 2024-11-30

## 2024-12-12 RX ORDER — OXYCODONE AND ACETAMINOPHEN 5; 325 MG/1; MG/1
1 TABLET ORAL 2 TIMES DAILY PRN
Status: DISCONTINUED | OUTPATIENT
Start: 2024-12-12 | End: 2024-12-14 | Stop reason: HOSPADM

## 2024-12-12 RX ORDER — PANTOPRAZOLE SODIUM 40 MG/1
40 TABLET, DELAYED RELEASE ORAL
Status: DISCONTINUED | OUTPATIENT
Start: 2024-12-13 | End: 2024-12-14 | Stop reason: HOSPADM

## 2024-12-12 RX ORDER — ATORVASTATIN CALCIUM 80 MG/1
80 TABLET, FILM COATED ORAL NIGHTLY
Status: DISCONTINUED | OUTPATIENT
Start: 2024-12-12 | End: 2024-12-14 | Stop reason: HOSPADM

## 2024-12-12 RX ORDER — AMOXICILLIN 250 MG
2 CAPSULE ORAL 2 TIMES DAILY
Status: DISCONTINUED | OUTPATIENT
Start: 2024-12-12 | End: 2024-12-14 | Stop reason: HOSPADM

## 2024-12-12 RX ORDER — LEVETIRACETAM 500 MG/1
500 TABLET ORAL 2 TIMES DAILY
Status: DISCONTINUED | OUTPATIENT
Start: 2024-12-12 | End: 2024-12-14 | Stop reason: HOSPADM

## 2024-12-12 RX ORDER — FERROUS SULFATE 325(65) MG
65 TABLET ORAL
Status: DISCONTINUED | OUTPATIENT
Start: 2024-12-13 | End: 2024-12-14 | Stop reason: HOSPADM

## 2024-12-12 RX ORDER — METOPROLOL SUCCINATE 50 MG/1
50 TABLET, EXTENDED RELEASE ORAL DAILY
Status: DISCONTINUED | OUTPATIENT
Start: 2024-12-12 | End: 2024-12-14 | Stop reason: HOSPADM

## 2024-12-12 RX ORDER — METHOCARBAMOL 750 MG/1
750 TABLET, FILM COATED ORAL 3 TIMES DAILY PRN
COMMUNITY
Start: 2024-11-12

## 2024-12-12 RX ORDER — ERGOCALCIFEROL 1.25 MG/1
1250 CAPSULE ORAL
Status: DISCONTINUED | OUTPATIENT
Start: 2024-12-12 | End: 2024-12-14 | Stop reason: HOSPADM

## 2024-12-12 RX ORDER — KETOROLAC TROMETHAMINE 15 MG/ML
15 INJECTION, SOLUTION INTRAMUSCULAR; INTRAVENOUS ONCE
Status: COMPLETED | OUTPATIENT
Start: 2024-12-12 | End: 2024-12-12

## 2024-12-12 RX ORDER — OMEPRAZOLE 40 MG/1
40 CAPSULE, DELAYED RELEASE ORAL DAILY
COMMUNITY

## 2024-12-12 RX ORDER — OXYBUTYNIN CHLORIDE 5 MG/1
5 TABLET ORAL 2 TIMES DAILY
COMMUNITY
Start: 2024-11-23

## 2024-12-12 RX ORDER — METHOCARBAMOL 500 MG/1
750 TABLET, FILM COATED ORAL 3 TIMES DAILY PRN
Status: DISCONTINUED | OUTPATIENT
Start: 2024-12-12 | End: 2024-12-14 | Stop reason: HOSPADM

## 2024-12-12 RX ORDER — FERROUS SULFATE 325(65) MG
325 TABLET ORAL
COMMUNITY
Start: 2024-11-29

## 2024-12-12 RX ORDER — GADOTERATE MEGLUMINE 376.9 MG/ML
18 INJECTION INTRAVENOUS
Status: COMPLETED | OUTPATIENT
Start: 2024-12-12 | End: 2024-12-12

## 2024-12-12 RX ORDER — ERGOCALCIFEROL 1.25 MG/1
1 CAPSULE ORAL
COMMUNITY
Start: 2024-10-31

## 2024-12-12 RX ORDER — ESCITALOPRAM OXALATE 10 MG/1
10 TABLET ORAL DAILY
Status: DISCONTINUED | OUTPATIENT
Start: 2024-12-12 | End: 2024-12-14 | Stop reason: HOSPADM

## 2024-12-12 RX ORDER — AZELASTINE 1 MG/ML
2 SPRAY, METERED NASAL 2 TIMES DAILY
Status: DISCONTINUED | OUTPATIENT
Start: 2024-12-12 | End: 2024-12-14 | Stop reason: HOSPADM

## 2024-12-12 RX ORDER — ACETAMINOPHEN 325 MG/1
975 TABLET ORAL EVERY 8 HOURS
Status: DISCONTINUED | OUTPATIENT
Start: 2024-12-12 | End: 2024-12-14 | Stop reason: HOSPADM

## 2024-12-12 RX ADMIN — LEVETIRACETAM 500 MG: 500 TABLET, FILM COATED ORAL at 12:01

## 2024-12-12 RX ADMIN — OXYBUTYNIN CHLORIDE 5 MG: 5 TABLET ORAL at 21:24

## 2024-12-12 RX ADMIN — OXYCODONE HYDROCHLORIDE AND ACETAMINOPHEN 1 TABLET: 5; 325 TABLET ORAL at 12:01

## 2024-12-12 RX ADMIN — LEVETIRACETAM 500 MG: 500 TABLET, FILM COATED ORAL at 21:24

## 2024-12-12 RX ADMIN — ERGOCALCIFEROL 1250 MCG: 1.25 CAPSULE ORAL at 12:00

## 2024-12-12 RX ADMIN — KETOROLAC TROMETHAMINE 15 MG: 15 INJECTION, SOLUTION INTRAMUSCULAR; INTRAVENOUS at 05:23

## 2024-12-12 RX ADMIN — HYDROMORPHONE HYDROCHLORIDE 0.5 MG: 1 INJECTION, SOLUTION INTRAMUSCULAR; INTRAVENOUS; SUBCUTANEOUS at 02:28

## 2024-12-12 RX ADMIN — GADOTERATE MEGLUMINE 18 ML: 376.9 INJECTION INTRAVENOUS at 01:04

## 2024-12-12 RX ADMIN — SENNOSIDES AND DOCUSATE SODIUM 2 TABLET: 50; 8.6 TABLET ORAL at 11:05

## 2024-12-12 RX ADMIN — SENNOSIDES AND DOCUSATE SODIUM 2 TABLET: 50; 8.6 TABLET ORAL at 21:25

## 2024-12-12 RX ADMIN — ATORVASTATIN CALCIUM 80 MG: 80 TABLET, FILM COATED ORAL at 21:25

## 2024-12-12 RX ADMIN — METHOCARBAMOL TABLETS 750 MG: 500 TABLET, COATED ORAL at 17:07

## 2024-12-12 RX ADMIN — ESCITALOPRAM OXALATE 10 MG: 10 TABLET ORAL at 12:00

## 2024-12-12 RX ADMIN — ENOXAPARIN SODIUM 40 MG: 100 INJECTION SUBCUTANEOUS at 11:05

## 2024-12-12 RX ADMIN — HYDROMORPHONE HYDROCHLORIDE 0.5 MG: 1 INJECTION, SOLUTION INTRAMUSCULAR; INTRAVENOUS; SUBCUTANEOUS at 07:44

## 2024-12-12 RX ADMIN — METOPROLOL SUCCINATE 50 MG: 50 TABLET, EXTENDED RELEASE ORAL at 12:00

## 2024-12-12 RX ADMIN — LIDOCAINE 4% 1 PATCH: 40 PATCH TOPICAL at 17:05

## 2024-12-12 RX ADMIN — ACETAMINOPHEN 975 MG: 325 TABLET ORAL at 12:00

## 2024-12-12 RX ADMIN — OXYBUTYNIN CHLORIDE 5 MG: 5 TABLET ORAL at 12:00

## 2024-12-12 RX ADMIN — FOLIC ACID 1 MG: 1 TABLET ORAL at 12:00

## 2024-12-12 RX ADMIN — ACETAMINOPHEN 975 MG: 325 TABLET ORAL at 20:08

## 2024-12-12 SDOH — HEALTH STABILITY: MENTAL HEALTH: HOW OFTEN DO YOU HAVE A DRINK CONTAINING ALCOHOL?: NEVER

## 2024-12-12 SDOH — ECONOMIC STABILITY: FOOD INSECURITY: HOW HARD IS IT FOR YOU TO PAY FOR THE VERY BASICS LIKE FOOD, HOUSING, MEDICAL CARE, AND HEATING?: PATIENT DECLINED

## 2024-12-12 SDOH — SOCIAL STABILITY: SOCIAL INSECURITY
WITHIN THE LAST YEAR, HAVE YOU BEEN HUMILIATED OR EMOTIONALLY ABUSED IN OTHER WAYS BY YOUR PARTNER OR EX-PARTNER?: PATIENT DECLINED

## 2024-12-12 SDOH — ECONOMIC STABILITY: FOOD INSECURITY
WITHIN THE PAST 12 MONTHS, YOU WORRIED THAT YOUR FOOD WOULD RUN OUT BEFORE YOU GOT THE MONEY TO BUY MORE.: PATIENT DECLINED

## 2024-12-12 SDOH — HEALTH STABILITY: PHYSICAL HEALTH
HOW OFTEN DO YOU NEED TO HAVE SOMEONE HELP YOU WHEN YOU READ INSTRUCTIONS, PAMPHLETS, OR OTHER WRITTEN MATERIAL FROM YOUR DOCTOR OR PHARMACY?: NEVER

## 2024-12-12 SDOH — ECONOMIC STABILITY: HOUSING INSECURITY: IN THE PAST 12 MONTHS, HOW MANY TIMES HAVE YOU MOVED WHERE YOU WERE LIVING?: 1

## 2024-12-12 SDOH — SOCIAL STABILITY: SOCIAL NETWORK: IN A TYPICAL WEEK, HOW MANY TIMES DO YOU TALK ON THE PHONE WITH FAMILY, FRIENDS, OR NEIGHBORS?: PATIENT DECLINED

## 2024-12-12 SDOH — SOCIAL STABILITY: SOCIAL INSECURITY: WITHIN THE LAST YEAR, HAVE YOU BEEN AFRAID OF YOUR PARTNER OR EX-PARTNER?: PATIENT DECLINED

## 2024-12-12 SDOH — SOCIAL STABILITY: SOCIAL INSECURITY: DOES ANYONE TRY TO KEEP YOU FROM HAVING/CONTACTING OTHER FRIENDS OR DOING THINGS OUTSIDE YOUR HOME?: NO

## 2024-12-12 SDOH — SOCIAL STABILITY: SOCIAL NETWORK: HOW OFTEN DO YOU ATTEND CHURCH OR RELIGIOUS SERVICES?: PATIENT DECLINED

## 2024-12-12 SDOH — ECONOMIC STABILITY: TRANSPORTATION INSECURITY
IN THE PAST 12 MONTHS, HAS LACK OF TRANSPORTATION KEPT YOU FROM MEDICAL APPOINTMENTS OR FROM GETTING MEDICATIONS?: PATIENT DECLINED

## 2024-12-12 SDOH — ECONOMIC STABILITY: HOUSING INSECURITY: AT ANY TIME IN THE PAST 12 MONTHS, WERE YOU HOMELESS OR LIVING IN A SHELTER (INCLUDING NOW)?: PATIENT DECLINED

## 2024-12-12 SDOH — HEALTH STABILITY: MENTAL HEALTH
DO YOU FEEL STRESS - TENSE, RESTLESS, NERVOUS, OR ANXIOUS, OR UNABLE TO SLEEP AT NIGHT BECAUSE YOUR MIND IS TROUBLED ALL THE TIME - THESE DAYS?: PATIENT DECLINED

## 2024-12-12 SDOH — SOCIAL STABILITY: SOCIAL INSECURITY: HAVE YOU HAD ANY THOUGHTS OF HARMING ANYONE ELSE?: NO

## 2024-12-12 SDOH — ECONOMIC STABILITY: INCOME INSECURITY
IN THE PAST 12 MONTHS HAS THE ELECTRIC, GAS, OIL, OR WATER COMPANY THREATENED TO SHUT OFF SERVICES IN YOUR HOME?: PATIENT DECLINED

## 2024-12-12 SDOH — SOCIAL STABILITY: SOCIAL NETWORK
DO YOU BELONG TO ANY CLUBS OR ORGANIZATIONS SUCH AS CHURCH GROUPS, UNIONS, FRATERNAL OR ATHLETIC GROUPS, OR SCHOOL GROUPS?: PATIENT DECLINED

## 2024-12-12 SDOH — SOCIAL STABILITY: SOCIAL INSECURITY
WITHIN THE LAST YEAR, HAVE YOU BEEN KICKED, HIT, SLAPPED, OR OTHERWISE PHYSICALLY HURT BY YOUR PARTNER OR EX-PARTNER?: PATIENT DECLINED

## 2024-12-12 SDOH — SOCIAL STABILITY: SOCIAL NETWORK: HOW OFTEN DO YOU GET TOGETHER WITH FRIENDS OR RELATIVES?: PATIENT DECLINED

## 2024-12-12 SDOH — SOCIAL STABILITY: SOCIAL INSECURITY: HAS ANYONE EVER THREATENED TO HURT YOUR FAMILY OR YOUR PETS?: NO

## 2024-12-12 SDOH — SOCIAL STABILITY: SOCIAL INSECURITY: DO YOU FEEL ANYONE HAS EXPLOITED OR TAKEN ADVANTAGE OF YOU FINANCIALLY OR OF YOUR PERSONAL PROPERTY?: NO

## 2024-12-12 SDOH — SOCIAL STABILITY: SOCIAL INSECURITY
WITHIN THE LAST YEAR, HAVE YOU BEEN RAPED OR FORCED TO HAVE ANY KIND OF SEXUAL ACTIVITY BY YOUR PARTNER OR EX-PARTNER?: PATIENT DECLINED

## 2024-12-12 SDOH — ECONOMIC STABILITY: HOUSING INSECURITY: IN THE LAST 12 MONTHS, WAS THERE A TIME WHEN YOU WERE NOT ABLE TO PAY THE MORTGAGE OR RENT ON TIME?: PATIENT DECLINED

## 2024-12-12 SDOH — HEALTH STABILITY: PHYSICAL HEALTH: ON AVERAGE, HOW MANY MINUTES DO YOU ENGAGE IN EXERCISE AT THIS LEVEL?: 0 MIN

## 2024-12-12 SDOH — HEALTH STABILITY: PHYSICAL HEALTH: ON AVERAGE, HOW MANY DAYS PER WEEK DO YOU ENGAGE IN MODERATE TO STRENUOUS EXERCISE (LIKE A BRISK WALK)?: 0 DAYS

## 2024-12-12 SDOH — SOCIAL STABILITY: SOCIAL INSECURITY: ARE YOU MARRIED, WIDOWED, DIVORCED, SEPARATED, NEVER MARRIED, OR LIVING WITH A PARTNER?: PATIENT DECLINED

## 2024-12-12 SDOH — ECONOMIC STABILITY: FOOD INSECURITY: WITHIN THE PAST 12 MONTHS, THE FOOD YOU BOUGHT JUST DIDN'T LAST AND YOU DIDN'T HAVE MONEY TO GET MORE.: PATIENT DECLINED

## 2024-12-12 SDOH — SOCIAL STABILITY: SOCIAL INSECURITY: HAVE YOU HAD THOUGHTS OF HARMING ANYONE ELSE?: NO

## 2024-12-12 SDOH — SOCIAL STABILITY: SOCIAL NETWORK: HOW OFTEN DO YOU ATTEND MEETINGS OF THE CLUBS OR ORGANIZATIONS YOU BELONG TO?: PATIENT DECLINED

## 2024-12-12 SDOH — SOCIAL STABILITY: SOCIAL INSECURITY: ARE YOU OR HAVE YOU BEEN THREATENED OR ABUSED PHYSICALLY, EMOTIONALLY, OR SEXUALLY BY ANYONE?: NO

## 2024-12-12 SDOH — SOCIAL STABILITY: SOCIAL INSECURITY: ARE THERE ANY APPARENT SIGNS OF INJURIES/BEHAVIORS THAT COULD BE RELATED TO ABUSE/NEGLECT?: NO

## 2024-12-12 SDOH — SOCIAL STABILITY: SOCIAL INSECURITY: WERE YOU ABLE TO COMPLETE ALL THE BEHAVIORAL HEALTH SCREENINGS?: YES

## 2024-12-12 SDOH — SOCIAL STABILITY: SOCIAL INSECURITY: DO YOU FEEL UNSAFE GOING BACK TO THE PLACE WHERE YOU ARE LIVING?: NO

## 2024-12-12 SDOH — SOCIAL STABILITY: SOCIAL INSECURITY: ABUSE: ADULT

## 2024-12-12 ASSESSMENT — ENCOUNTER SYMPTOMS
CARDIOVASCULAR NEGATIVE: 1
NEUROLOGICAL NEGATIVE: 1
GASTROINTESTINAL NEGATIVE: 1
MUSCULOSKELETAL NEGATIVE: 1
CONSTITUTIONAL NEGATIVE: 1
EYES NEGATIVE: 1

## 2024-12-12 ASSESSMENT — PATIENT HEALTH QUESTIONNAIRE - PHQ9
1. LITTLE INTEREST OR PLEASURE IN DOING THINGS: NOT AT ALL
SUM OF ALL RESPONSES TO PHQ9 QUESTIONS 1 & 2: 0
2. FEELING DOWN, DEPRESSED OR HOPELESS: NOT AT ALL

## 2024-12-12 ASSESSMENT — ACTIVITIES OF DAILY LIVING (ADL)
ADEQUATE_TO_COMPLETE_ADL: YES
ADEQUATE_TO_COMPLETE_ADL: YES
HEARING - LEFT EAR: FUNCTIONAL
JUDGMENT_ADEQUATE_SAFELY_COMPLETE_DAILY_ACTIVITIES: YES
GROOMING: INDEPENDENT
JUDGMENT_ADEQUATE_SAFELY_COMPLETE_DAILY_ACTIVITIES: YES
WALKS IN HOME: INDEPENDENT
HEARING - LEFT EAR: FUNCTIONAL
FEEDING YOURSELF: INDEPENDENT
LACK_OF_TRANSPORTATION: NO
PATIENT'S MEMORY ADEQUATE TO SAFELY COMPLETE DAILY ACTIVITIES?: YES
BATHING: INDEPENDENT
LACK_OF_TRANSPORTATION: PATIENT DECLINED
TOILETING: INDEPENDENT
GROOMING: INDEPENDENT
BATHING: INDEPENDENT
DRESSING YOURSELF: INDEPENDENT
PATIENT'S MEMORY ADEQUATE TO SAFELY COMPLETE DAILY ACTIVITIES?: YES
DRESSING YOURSELF: INDEPENDENT
TOILETING: INDEPENDENT
WALKS IN HOME: INDEPENDENT
FEEDING YOURSELF: INDEPENDENT
HEARING - RIGHT EAR: FUNCTIONAL
HEARING - RIGHT EAR: FUNCTIONAL

## 2024-12-12 ASSESSMENT — COGNITIVE AND FUNCTIONAL STATUS - GENERAL
PATIENT BASELINE BEDBOUND: NO
WALKING IN HOSPITAL ROOM: A LITTLE
MOBILITY SCORE: 22
DAILY ACTIVITIY SCORE: 24
CLIMB 3 TO 5 STEPS WITH RAILING: A LITTLE

## 2024-12-12 ASSESSMENT — LIFESTYLE VARIABLES
AUDIT-C TOTAL SCORE: 0
SKIP TO QUESTIONS 9-10: 1
SUBSTANCE_ABUSE_PAST_12_MONTHS: NO
HOW OFTEN DO YOU HAVE 6 OR MORE DRINKS ON ONE OCCASION: NEVER
AUDIT-C TOTAL SCORE: 0
PRESCIPTION_ABUSE_PAST_12_MONTHS: NO
HOW OFTEN DO YOU HAVE A DRINK CONTAINING ALCOHOL: NEVER
HOW MANY STANDARD DRINKS CONTAINING ALCOHOL DO YOU HAVE ON A TYPICAL DAY: PATIENT DOES NOT DRINK

## 2024-12-12 ASSESSMENT — PAIN SCALES - GENERAL: PAINLEVEL_OUTOF10: 0 - NO PAIN

## 2024-12-12 NOTE — ED PROVIDER NOTES
Emergency Department Provider Note        History of Present Illness     History provided by: Patient  Limitations to History: None    HPI:  Patient is a 59-year-old female with a past medical history of multiple sclerosis presenting to the ED for back pain and a fall.  Patient states that she has a known L5-S1 herniated disc states that she saw spine yesterday regarding this she was referred to pain management for injections.  Patient states she had an injection in September with no relief.  Patient states she is having a hard time ambulating because of the pain.  Patient states that she is having decreased sensation, pain when moving her right leg.  Patient states her left leg is already weak due to her MS.  Patient states that she was in the kitchen when she lost her balance and hit her head.  Patient says she believes she lost consciousness and saw black.  Patient states that she has been having urinary incontinence however no fecal incontinence.  Physical Exam   Triage vitals:  T 36.1 °C (96.9 °F)  HR 82  /77  RR 16  O2 96 % None (Room air)    Physical Exam  Constitutional:       Appearance: Normal appearance.   HENT:      Head: Normocephalic.   Eyes:      Extraocular Movements: Extraocular movements intact.      Pupils: Pupils are equal, round, and reactive to light.   Neck:      Comments: No C-spine tenderness  Cardiovascular:      Rate and Rhythm: Normal rate.   Pulmonary:      Effort: Pulmonary effort is normal.   Abdominal:      General: Abdomen is flat.   Musculoskeletal:      Cervical back: Normal range of motion.      Comments: Left lower extremity weakness, right lower extremity is flexed unable to move due to pain.  L spinal tenderness   Skin:     General: Skin is warm.   Neurological:      Mental Status: She is alert. Mental status is at baseline.      Comments: Bilateral lower extremity decreased sensation   Psychiatric:         Mood and Affect: Mood normal.         Behavior: Behavior  normal.          Medical Decision Making & ED Course   Medical Decision Making:  Patient is a 59-year-old female presented to the ED with a fall and back pain.  Patient has a known L5-S1 herniated disc and recently saw spine regarding this.  She was referred to pain management for the potential of an injection.  Patient states she had a steroid injection in her back in September however does not remember where they put it.  Patient states chronically she has left lower extremity weakness and decreased sensation due to her MS however states that her right lower extremity has worsened and pain, sensation.  Patient also complains of urinary incontinence.  Patient had a known MRI that showed congestion in the cauda equina area.  There is concern for cauda equina and therefore an MRI is been ordered.  Concern for possible spinal abscess however patient is afebrile there is no redness or swelling in the back but does have lumbar pain.  Low concern for an MS flare as patient states that her MS flares do not usually present like this states that it typically is just left lower extremity weakness/foot drop.  Please see ED course further details      EKG Independent Interpretation: EKG not obtained        The patient was discussed with the following consultants/services: Orthopedic surgery      ED Course as of 12/12/24 0715   Thu Dec 12, 2024   0052 Patient labs show no leukocytosis, magnesium within normal range, [TS]   0053 Creatinine elevated at 1.11 which seems to be progressive change was 11 months ago was 0.7, 2 months ago 1.03, 1 month ago 0.95 with a slowly decreasing GFR. [TS]   0402 Orthopedic surgery saw the patient states compactly benefit from operative management therefore preop labs were obtained and patient was kept NPO. [TS]   0420 EKG normal sinus rhythm, heart 92, QTc 472, no STEMI [TS]   0435 Imaging shows degenerative changes as well as disc bulge/extrusion extending into the bilateral lateral recess and  contacting the S1 nerve root no cauda equina [TS]   0654 Patient was signed out pending ortho recommendations  [TS]      ED Course User Index  [TS] Ping Leary MD          Disposition   Patient was signed out to oncoming provider pending completion of their work-up.  Please see the next provider's transition of care note for the remainder of the patient's care.     Procedures   Procedures    Patient seen and discussed with ED attending physician.    Ping Leary MD  Emergency Medicine       Ping Leary MD  Resident  12/12/24 6401

## 2024-12-12 NOTE — H&P
HPI     Ms. Gallegos is a 59-year-old female with PMHx: MS, depression, HTN, HLD, T2DM, fibromyalgia, chronic pain chronic headaches.  Patient presented to the ED today with complaint of pain pain in her tailbone that goes down her right buttocks down the right leg behind her knee and into her foot sits times her leg does not fully extend due to this pain.  Patient also states she has had 2 falls since Sunday because of this pain she is also losing some sensation in her leg.  Patient states symptoms started back in September.  Patient sees neurology for her MS asked if patient followed up with with neurology and patient stated no.  Patient also states left leg is usually weaker than the right.  On exam patient was noted to have a Jaramillo- unable to find documentation on why but patient states she was having difficulty voiding.  Patient was seen by ortho while in the ED and did not recommend acute surgical interventions at this time.    While in the ED patient vitals were found to be stable labs were WNL for patient.  U tox did show amphetamines opiates and Oxy.  Patient does have a valid prescription every month for oxy she gets a 30-day dose on a regular basis.  Unsure why amphetamines would be positive as patient did not have any meds CXR just showed mild atelectasis.  MR spine showed some degenerative changes and lumbar herniation.   Patient to be admitted for weakness and lumbar disc herniation    ROS/EXAM     Review of Systems   Constitutional: Negative.    HENT: Negative.  Negative for mouth sores.    Eyes: Negative.    Cardiovascular: Negative.    Gastrointestinal: Negative.    Genitourinary: Negative.    Musculoskeletal:  Negative.    Skin: Negative.    Neurological: Negative.    All other systems reviewed and are negative.    Physical Exam  Vitals reviewed.   Constitutional:       Appearance: Normal appearance.   HENT:      Head: Normocephalic.      Mouth/Throat:      Mouth: Mucous membranes are dry.   Eyes:      Extraocular Movements: Extraocular movements intact.      Conjunctiva/sclera: Conjunctivae normal.      Pupils: Pupils are equal, round, and reactive to light.   Cardiovascular:      Rate and Rhythm: Normal rate and regular rhythm.      Pulses: Normal pulses.      Heart sounds: Normal heart sounds, S1 normal and S2 normal.   Pulmonary:      Effort: Pulmonary effort is normal.      Breath sounds: Normal breath sounds and air entry.   Abdominal:      General: Abdomen is flat. Bowel sounds are normal.      Palpations: Abdomen is soft.   Musculoskeletal:         General: Normal range of motion.      Cervical back: Full passive range of motion without pain and normal range of motion.   Skin:     General: Skin is warm and dry.   Neurological:      General: No focal deficit present.      Mental Status: She is alert and oriented to person, place, and time. Mental status is at baseline.   Psychiatric:         Attention and Perception: Attention normal.         Mood and Affect: Mood normal.         Speech: Speech normal.         Histories     Past Medical History:   Diagnosis Date    Anxiety     Asthma     Chronic headaches     Migraines    Depression     Diabetes mellitus (Multi)     10/23/23 A1C 6.0    Encounter for electrocardiogram 01/2024    EKG on 1/2/24    Fibromyalgia, primary     Fractures     Painful orthopaedic Closed fracture of left ankle with routine healing,    Gastritis     Hyperlipidemia     Hypertension     Joint pain     Lupus     MS (multiple sclerosis) (Multi)     F/W Dr. Silvia Molina LOV 11/2023    Painful orthopaedic hardware ([facility])     Ortho: Akil Collier    Seizure disorder (Multi)     Last occurence  in 2020    Stroke (Multi)     CVA in      Past Surgical History:   Procedure Laterality Date    APPENDECTOMY  2014    Appendectomy    CERVICAL FUSION       SECTION, CLASSIC       Section x2    COLONOSCOPY      CT ANGIO NECK  12/10/2019    CT NECK ANGIO W AND WO IV CONTRAST [place] [facility]    CT ANGIO NECK  2022    CT NECK ANGIO W AND WO IV CONTRAST 2022 [facility]    CT HEAD ANGIO W AND WO IV CONTRAST  12/10/2019    CT HEAD ANGIO W AND WO IV CONTRAST 12[place]10/2019 [facility]    CT HEAD ANGIO W AND WO IV CONTRAST  2022    CT HEAD ANGIO W AND WO IV CONTRAST 2022 [facility]    HYSTERECTOMY  10/05/2015    Hysterectomy    MR HEAD ANGIO WO IV CONTRAST  2020    MR HEAD ANGIO WO IV CONTRAST 2020 [facility]    MR HEAD ANGIO WO IV CONTRAST  2021    MR HEAD ANGIO WO IV CONTRAST 2021 [facility]    MR NECK ANGIO WO IV CONTRAST  2020    MR NECK ANGIO WO IV CONTRAST 2020 [facility]    MR NECK ANGIO WO IV CONTRAST  2021    MR NECK ANGIO WO IV CONTRAST 2021 [facility]    ORIF TIBIA FRACTURE       Family History   Problem Relation Name Age of Onset    Hypertension Mother      Cancer Father      Eating disorder Sister      Other (hoarding behavior) Sister      Lupus Mother's Sister          Systemic    Rheum arthritis Other Aunt       reports that she has been smoking cigarettes. She has never used smokeless tobacco. She reports that she does not currently use drugs after having used the following drugs: Cocaine. She reports that she does not drink alcohol.    Vitals/LABS/RESULTS     Last Recorded Vitals  Blood pressure 124/70, pulse 82, temperature 35.6 °C (96.1 °F), resp. rate 16, SpO2 96%.  Intake/Output last 3 Shifts:  No intake/output data recorded.    Relevant Results  Lab Results   Component Value Date    WBC 11.2 2024    HGB 11.5 (L) 2024     HCT 37.2 12/11/2024    MCV 86 12/11/2024     12/11/2024      Lab Results   Component Value Date    GLUCOSE 83 12/12/2024    CALCIUM 8.4 (L) 12/12/2024     (H) 12/12/2024    K 4.0 12/12/2024    CO2 27 12/12/2024     (H) 12/12/2024    BUN 26 (H) 12/12/2024    CREATININE 0.61 12/12/2024     XR chest 1 view    Result Date: 12/12/2024  STUDY: Chest Radiograph;  12/12/2024 2:45 AM INDICATION: Preoperative evaluation. COMPARISON: 10/23/2024 XR Chest two view ACCESSION NUMBER(S): [id number] ORDERING CLINICIAN: MARCOS LAWTON TECHNIQUE:  Frontal chest was obtained at 2:45 hours. FINDINGS: CARDIOMEDIASTINAL SILHOUETTE: Cardiomediastinal silhouette is normal in size and configuration.  LUNGS: Minimal bibasilar atelectasis.  ABDOMEN: No remarkable upper abdominal findings.  BONES: No acute osseous changes.    Minimal bibasilar atelectasis. Signed by Joaquim Burgess MD    MR lumbar spine w and wo IV contrast    Result Date: 12/12/2024  Interpreted By:  Romy Daugherty, STUDY: MR LUMBAR SPINE W AND WO IV CONTRAST; MR CERVICAL SPINE W AND WO IV CONTRAST; MR THORACIC SPINE W AND WO IV CONTRAST;  12/12/2024 1:27 am   INDICATION: Signs/Symptoms:concern for cauda equina; Signs/Symptoms:BLE weakness, injection.   COMPARISON: MRI, cervical and thoracic spine 02/11/2024, CT neck 10/23/2024   ACCESSION NUMBER(S): [id number]; [id number]; [id number]   ORDERING CLINICIAN: ANNA CROWDER; MARCOS LAWTON   TECHNIQUE: Sagittal T1, T2, axial T1 and axial gradient echo T2 weighted images were acquired through the cervical spine. Sagittal and transaxial T1 and T2 weighted images of the thoracic spine were acquired along with sagittal STIR imaging. Sagittal and axial T1 and T2 weighted images were also acquired through the lumbar spine. Sagittal and axial T1 weighted sequences of the cervical, thoracic and lumbar spine were obtained following intravenous administration of dotarem, gadolinium based contrast.   FINDINGS:  Cervical spine:   Redemonstrated postsurgical changes a C4, C5 and C6 laminectomies with posterior mack and pedicle screw fixation. There is associated susceptibility artifact.   ALIGNMENT:  There is straightening of cervical lordosis. Minimal retrolisthesis of C2 on C3, C3 on C4 and C4 on C5.   VERTEBRAE/INTERVERTEBRAL DISCS: The vertebral bodies demonstrate expected height. Slightly heterogeneous bone marrow signal without abnormal enhancement. Intervertebral disc space narrowing and disc desiccation.   CORD: Normal in caliber and signal.   C1-2: The cervicomedullary junction appears unremarkable. There is no central canal stenosis.   C2-3: Mild disc bulge with minimal left-sided canal narrowing. Foramina are patent.   C3-4: Retrolisthesis mild disc bulge without least mild canal narrowing. Mild foraminal narrowing.   C4-5: Canal is patent. Mild to moderate right foraminal narrowing.   C5-6: Mild foraminal narrowing. The canal is patent.   C6-7: Disc bulge with moderate foraminal and mild-to-moderate canal narrowing.   C7-T1: There is no significant central canal or neural foraminal stenosis.   Susceptibility artifact in the posterior paraspinal soft tissues likely related to postsurgical changes with possible remote blood products.   Thoracic spine:   ALIGNMENT: The vertebral alignment is within normal limits.   VERTEBRAE/INTERVERTEBRAL DISCS: The vertebral bodies demonstrate expected height. The marrow signal is within normal limits. Intervertebral disc space narrowing.   CORD: Normal in caliber and signal.   CENTRAL CANAL:  Multilevel degenerative changes disc space narrowing, small disc bulges and posterior ligamentous hypertrophy. Epidural lipomatosis noted. There is mild canal narrowing at T3-T4 and T4-T5. No critical canal stenosis identified. No abnormal enhancement.   The prevertebral and posterior paraspinous soft tissues are within normal limits.   Lumbar spine:   ALIGNMENT: Alignment is grossly  anatomic.   VERTEBRAE/INTERVERTEBRAL DISCS: Intervertebral disc space narrowing most significant at L5-S1. Slightly heterogeneous bone marrow signal without significant edema or abnormal enhancement. Vertebral body heights are grossly maintained.   CORD: The lower thoracic cord appears unremarkable.  The conus terminates appropriately at L2.  no abnormal enhancement.   At T12-L1,  there is no central canal stenosis or neural foraminal narrowing.   At L1-L2,  there is no central canal stenosis or neural foraminal narrowing.   At L2-L3,  there is no central canal stenosis or neural foraminal narrowing.   At L3-L4,  there is no central canal stenosis or neural foraminal narrowing.   At L4-L5,  there is facet hypertrophy and epidural lipomatosis with mild canal and lateral recess stenosis. There is mild neural foramina narrowing.   At L5-S1,  there is a prominent disc bulge with superimposed central extrusion resulting in a least moderate bilateral lateral recess stenosis which contacts the traversing S1 nerve roots. No critical canal stenosis.. There is mild-to-moderate left and moderate right neural foramina narrowing.   The prevertebral and posterior paraspinous soft tissues are within normal limits.   Right lower pole renal cyst       Degenerative changes of the lumbar spine most pronounced at L5-S1 with a disc bulge/extrusion extending into the bilateral lateral recess and contacting the traversing bilateral S1 nerve roots. No significant canal stenosis identified. No abnormal enhancement.   Mild degenerative changes of the thoracic spine. No critical canal stenosis or abnormal enhancement.   Postsurgical changes of the cervical spine with degenerative changes. Mild-to-moderate canal narrowing predominantly at C6-C7. No critical canal stenosis or abnormal enhancement.   MACRO: None.   Signed by: Romy Daugherty 12/12/2024 2:25 AM Dictation workstation:   [id number]    MR cervical spine w and wo IV  contrast    Result Date: 12/12/2024  Interpreted By:  Romy Daugherty, STUDY: MR LUMBAR SPINE W AND WO IV CONTRAST; MR CERVICAL SPINE W AND WO IV CONTRAST; MR THORACIC SPINE W AND WO IV CONTRAST;  12/12/2024 1:27 am   INDICATION: Signs/Symptoms:concern for cauda equina; Signs/Symptoms:BLE weakness, injection.   COMPARISON: MRI, cervical and thoracic spine 02/11/2024, CT neck 10/23/2024   ACCESSION NUMBER(S): [id number]; [id number]; [id number]   ORDERING CLINICIAN: ANNA CROWDER; MARCOS LAWTON   TECHNIQUE: Sagittal T1, T2, axial T1 and axial gradient echo T2 weighted images were acquired through the cervical spine. Sagittal and transaxial T1 and T2 weighted images of the thoracic spine were acquired along with sagittal STIR imaging. Sagittal and axial T1 and T2 weighted images were also acquired through the lumbar spine. Sagittal and axial T1 weighted sequences of the cervical, thoracic and lumbar spine were obtained following intravenous administration of dotarem, gadolinium based contrast.   FINDINGS: Cervical spine:   Redemonstrated postsurgical changes a C4, C5 and C6 laminectomies with posterior mack and pedicle screw fixation. There is associated susceptibility artifact.   ALIGNMENT:  There is straightening of cervical lordosis. Minimal retrolisthesis of C2 on C3, C3 on C4 and C4 on C5.   VERTEBRAE/INTERVERTEBRAL DISCS: The vertebral bodies demonstrate expected height. Slightly heterogeneous bone marrow signal without abnormal enhancement. Intervertebral disc space narrowing and disc desiccation.   CORD: Normal in caliber and signal.   C1-2: The cervicomedullary junction appears unremarkable. There is no central canal stenosis.   C2-3: Mild disc bulge with minimal left-sided canal narrowing. Foramina are patent.   C3-4: Retrolisthesis mild disc bulge without least mild canal narrowing. Mild foraminal narrowing.   C4-5: Canal is patent. Mild to moderate right foraminal narrowing.   C5-6: Mild foraminal  narrowing. The canal is patent.   C6-7: Disc bulge with moderate foraminal and mild-to-moderate canal narrowing.   C7-T1: There is no significant central canal or neural foraminal stenosis.   Susceptibility artifact in the posterior paraspinal soft tissues likely related to postsurgical changes with possible remote blood products.   Thoracic spine:   ALIGNMENT: The vertebral alignment is within normal limits.   VERTEBRAE/INTERVERTEBRAL DISCS: The vertebral bodies demonstrate expected height. The marrow signal is within normal limits. Intervertebral disc space narrowing.   CORD: Normal in caliber and signal.   CENTRAL CANAL:  Multilevel degenerative changes disc space narrowing, small disc bulges and posterior ligamentous hypertrophy. Epidural lipomatosis noted. There is mild canal narrowing at T3-T4 and T4-T5. No critical canal stenosis identified. No abnormal enhancement.   The prevertebral and posterior paraspinous soft tissues are within normal limits.   Lumbar spine:   ALIGNMENT: Alignment is grossly anatomic.   VERTEBRAE/INTERVERTEBRAL DISCS: Intervertebral disc space narrowing most significant at L5-S1. Slightly heterogeneous bone marrow signal without significant edema or abnormal enhancement. Vertebral body heights are grossly maintained.   CORD: The lower thoracic cord appears unremarkable.  The conus terminates appropriately at L2.  no abnormal enhancement.   At T12-L1,  there is no central canal stenosis or neural foraminal narrowing.   At L1-L2,  there is no central canal stenosis or neural foraminal narrowing.   At L2-L3,  there is no central canal stenosis or neural foraminal narrowing.   At L3-L4,  there is no central canal stenosis or neural foraminal narrowing.   At L4-L5,  there is facet hypertrophy and epidural lipomatosis with mild canal and lateral recess stenosis. There is mild neural foramina narrowing.   At L5-S1,  there is a prominent disc bulge with superimposed central extrusion resulting  in a least moderate bilateral lateral recess stenosis which contacts the traversing S1 nerve roots. No critical canal stenosis.. There is mild-to-moderate left and moderate right neural foramina narrowing.   The prevertebral and posterior paraspinous soft tissues are within normal limits.   Right lower pole renal cyst       Degenerative changes of the lumbar spine most pronounced at L5-S1 with a disc bulge/extrusion extending into the bilateral lateral recess and contacting the traversing bilateral S1 nerve roots. No significant canal stenosis identified. No abnormal enhancement.   Mild degenerative changes of the thoracic spine. No critical canal stenosis or abnormal enhancement.   Postsurgical changes of the cervical spine with degenerative changes. Mild-to-moderate canal narrowing predominantly at C6-C7. No critical canal stenosis or abnormal enhancement.   MACRO: None.   Signed by: Romy Daugherty 12/12/2024 2:25 AM Dictation workstation:   [id number]    MR thoracic spine w and wo IV contrast    Result Date: 12/12/2024  Interpreted By:  Romy Duagherty, STUDY: MR LUMBAR SPINE W AND WO IV CONTRAST; MR CERVICAL SPINE W AND WO IV CONTRAST; MR THORACIC SPINE W AND WO IV CONTRAST;  12/12/2024 1:27 am   INDICATION: Signs/Symptoms:concern for cauda equina; Signs/Symptoms:BLE weakness, injection.   COMPARISON: MRI, cervical and thoracic spine 02/11/2024, CT neck 10/23/2024   ACCESSION NUMBER(S): [id number]; [id number]; [id number]   ORDERING CLINICIAN: ANNA CROWDER; MARCOS LAWTON   TECHNIQUE: Sagittal T1, T2, axial T1 and axial gradient echo T2 weighted images were acquired through the cervical spine. Sagittal and transaxial T1 and T2 weighted images of the thoracic spine were acquired along with sagittal STIR imaging. Sagittal and axial T1 and T2 weighted images were also acquired through the lumbar spine. Sagittal and axial T1 weighted sequences of the cervical, thoracic and lumbar spine were obtained following  intravenous administration of dotarem, gadolinium based contrast.   FINDINGS: Cervical spine:   Redemonstrated postsurgical changes a C4, C5 and C6 laminectomies with posterior mack and pedicle screw fixation. There is associated susceptibility artifact.   ALIGNMENT:  There is straightening of cervical lordosis. Minimal retrolisthesis of C2 on C3, C3 on C4 and C4 on C5.   VERTEBRAE/INTERVERTEBRAL DISCS: The vertebral bodies demonstrate expected height. Slightly heterogeneous bone marrow signal without abnormal enhancement. Intervertebral disc space narrowing and disc desiccation.   CORD: Normal in caliber and signal.   C1-2: The cervicomedullary junction appears unremarkable. There is no central canal stenosis.   C2-3: Mild disc bulge with minimal left-sided canal narrowing. Foramina are patent.   C3-4: Retrolisthesis mild disc bulge without least mild canal narrowing. Mild foraminal narrowing.   C4-5: Canal is patent. Mild to moderate right foraminal narrowing.   C5-6: Mild foraminal narrowing. The canal is patent.   C6-7: Disc bulge with moderate foraminal and mild-to-moderate canal narrowing.   C7-T1: There is no significant central canal or neural foraminal stenosis.   Susceptibility artifact in the posterior paraspinal soft tissues likely related to postsurgical changes with possible remote blood products.   Thoracic spine:   ALIGNMENT: The vertebral alignment is within normal limits.   VERTEBRAE/INTERVERTEBRAL DISCS: The vertebral bodies demonstrate expected height. The marrow signal is within normal limits. Intervertebral disc space narrowing.   CORD: Normal in caliber and signal.   CENTRAL CANAL:  Multilevel degenerative changes disc space narrowing, small disc bulges and posterior ligamentous hypertrophy. Epidural lipomatosis noted. There is mild canal narrowing at T3-T4 and T4-T5. No critical canal stenosis identified. No abnormal enhancement.   The prevertebral and posterior paraspinous soft tissues are  within normal limits.   Lumbar spine:   ALIGNMENT: Alignment is grossly anatomic.   VERTEBRAE/INTERVERTEBRAL DISCS: Intervertebral disc space narrowing most significant at L5-S1. Slightly heterogeneous bone marrow signal without significant edema or abnormal enhancement. Vertebral body heights are grossly maintained.   CORD: The lower thoracic cord appears unremarkable.  The conus terminates appropriately at L2.  no abnormal enhancement.   At T12-L1,  there is no central canal stenosis or neural foraminal narrowing.   At L1-L2,  there is no central canal stenosis or neural foraminal narrowing.   At L2-L3,  there is no central canal stenosis or neural foraminal narrowing.   At L3-L4,  there is no central canal stenosis or neural foraminal narrowing.   At L4-L5,  there is facet hypertrophy and epidural lipomatosis with mild canal and lateral recess stenosis. There is mild neural foramina narrowing.   At L5-S1,  there is a prominent disc bulge with superimposed central extrusion resulting in a least moderate bilateral lateral recess stenosis which contacts the traversing S1 nerve roots. No critical canal stenosis.. There is mild-to-moderate left and moderate right neural foramina narrowing.   The prevertebral and posterior paraspinous soft tissues are within normal limits.   Right lower pole renal cyst       Degenerative changes of the lumbar spine most pronounced at L5-S1 with a disc bulge/extrusion extending into the bilateral lateral recess and contacting the traversing bilateral S1 nerve roots. No significant canal stenosis identified. No abnormal enhancement.   Mild degenerative changes of the thoracic spine. No critical canal stenosis or abnormal enhancement.   Postsurgical changes of the cervical spine with degenerative changes. Mild-to-moderate canal narrowing predominantly at C6-C7. No critical canal stenosis or abnormal enhancement.   MACRO: None.   Signed by: Romy Daugherty 12/12/2024 2:25 AM Dictation  workstation:   [id number]      Medications     Scheduled medications  acetaminophen, 975 mg, oral, q8h  atorvastatin, 80 mg, oral, Nightly  azelastine, 2 spray, Each Nostril, BID  enoxaparin, 40 mg, subcutaneous, q24h  ergocalciferol, 1,250 mcg, oral, q7 days  escitalopram, 10 mg, oral, Daily  [START ON 12/13/2024] ferrous sulfate (325 mg ferrous sulfate), 65 mg of iron, oral, Daily with breakfast  folic acid, 1 mg, oral, Daily  levETIRAcetam, 500 mg, oral, BID  lidocaine, 1 patch, transdermal, Daily  [START ON 12/13/2024] metFORMIN, 500 mg, oral, Daily with breakfast  metoprolol succinate XL, 50 mg, oral, Daily  oxybutynin, 5 mg, oral, BID  [START ON 12/13/2024] pantoprazole, 40 mg, oral, Daily before breakfast  sennosides-docusate sodium, 2 tablet, oral, BID      Continuous medications     PRN medications  PRN medications: methocarbamol, oxyCODONE-acetaminophen    PLAN     Ms. Gallegos is a 59-year-old female with PMHx: MS, depression, HTN, HLD, T2DM, fibromyalgia, chronic pain chronic headaches.  Patient presented to the ED today with complaint of pain pain in her tailbone that goes down her right buttocks down the right leg behind her knee and into her foot sits times her leg does not fully extend due to this pain.  Patient also states she has had 2 falls since Sunday because of this pain she is also losing some sensation in her leg.  Patient states symptoms started back in September.  Patient sees neurology for her MS asked if patient followed up with with neurology and patient stated no.  Patient also states left leg is usually weaker than the right.  On exam patient was noted to have a Jaramillo- unable to find documentation on why but patient states she was having difficulty voiding.  Patient was seen by ortho while in the ED and did not recommend acute surgical interventions at this time.    While in the ED patient vitals were found to be stable labs were WNL for patient.  U tox did show amphetamines opiates  and Oxy.  Patient does have a valid prescription every month for oxy she gets a 30-day dose on a regular basis.  Unsure why amphetamines would be positive as patient did not have any meds CXR just showed mild atelectasis.  MR spine showed some degenerative changes and lumbar herniation.   Patient to be admitted for weakness and lumbar disc herniation  Assessment/Plan:    Weakness  Lumbar herniation  Chronic pain  -OARRS checked  -Continue home Percocet as needed twice daily  -Orthospine consult and appreciate recs  -Per Ortho no surgical interventions at this time  -Will order methocarbamol 750 mg 3 times daily as needed  -PT OT eval and treat    Urinary retention ?  Urinary incontinence  -Patient stated she had retention in the ED and they placed a Jaramillo  -Will DC Jaramillo  -Bladder scan with PVRs with each void  -Continue home oxybutynin 5 mg twice daily    #Sjogrens  #SLE  -no meds at this time    MS  -follows Neurology  -Hold home Tecfidera non formulary     #Seizures  #Migaine  -Keppra level  -Continue on keppra  -Home nurtec not ordered (nonformulary)     #T2DM  ::On metformin and trulicity at home  ::HbA1c 6.2  -SSI     GERD  -Continue home omeprazole as Protonix 40    #HLD  #HTN  -Continue home metoprolol  -Continue home atorvastatin     #MDD  -Continue home lexapro       Fluids: monitor and replete as needed  Electrolytes: monitor and replete as needed  Nutrition: Regular diet   GI prophylaxis: Protonix 40mg QD  DVT prophylaxis: SCD  Code Status:   PCP  Pharmacy    Disposition:  Admitted for above diagnoses. Plan per above. Anticipate hospitalization >2 midnights.       Kimberley Johnson, APRN-CNP         I spent 75 minutes in the professional and overall care on this encounter before, during and after the visit, examining the patient, reviewing labs, writing orders and documenting the note

## 2024-12-12 NOTE — PROGRESS NOTES
Una Gallegos is a 59 y.o. female on day 0 of admission presenting with Lumbar disc herniation.       12/12/24 4846   Discharge Planning   Living Arrangements Children;Family members   Support Systems Children;Family members   Assistance Needed HC in past after broken ankle, No DME in hx   Type of Residence Private residence  (4 stairs to enter the home. half bath on the first floor)   Number of Stairs to Enter Residence 4   Number of Stairs Within Residence 12   Do you have animals or pets at home? Yes   Type of Animals or Pets cat and fish   Who is requesting discharge planning? Provider   Home or Post Acute Services None   Expected Discharge Disposition Home  (Pending PT/OT eval)   Does the patient need discharge transport arranged? Yes   RoundTrip coordination needed? Yes   Has discharge transport been arranged? No   Financial Resource Strain   How hard is it for you to pay for the very basics like food, housing, medical care, and heating? Somewhat   Housing Stability   In the last 12 months, was there a time when you were not able to pay the mortgage or rent on time? Y   At any time in the past 12 months, were you homeless or living in a shelter (including now)? N   Transportation Needs   In the past 12 months, has lack of transportation kept you from medical appointments or from getting medications? no   In the past 12 months, has lack of transportation kept you from meetings, work, or from getting things needed for daily living? No   Patient Choice   Provider Choice list and CMS website (https://medicare.gov/care-compare#search) for post-acute Quality and Resource Measure Data were provided and reviewed with: Family   Patient / Family choosing to utilize agency / facility established prior to hospitalization Yes   Stroke Family Assessment   Stroke Family Assessment Needed No   Intensity of Service   Intensity of Service 0-30 min     SW met with patient and family at bedside to complete assessment.  Patient currently lives with daughter and grandchildren. Sister Pippa at bedside who is also supportive. Patient stated she needs to reengage with  PCP at [facility]. Also sees Dr Rivera at [facility]. Hx of [facility] after broken ankle. No current DME. Denies falls within the last year. Pending RNF.

## 2024-12-12 NOTE — CONSULTS
Orthopaedic Surgery Consult H&P    HPI:   Orthopaedic Problems/Injuries: Chronic lumbar radiculopathy w c/f cauda equina  Other Injuries: None     59F (MS w baseline BLE weakness (L>R), hx of lumbar radiculopathy 2/2 L5-S1 disc herniation) presenting w worsening RLE radicular pain and subjective weakness following mGLF. Also complains of 3w of urinary incontinence. Evaluated by Saji on 12/10 w plans for conservative treatment of disc herniation.     PMH: per above/EMR  PSH: per above/EMR  SocHx:      -  Denies tobacco use      -  Denies EtOH use      -  Denies other drug use  FamHx:  Non-contributory to this patient's acute orthopaedic problem.   Allergies: Reviewed in EMR  Meds: Reviewed in EMR    ROS      - 14 point ROS negative except as above    Physical Exam:  Gen: AOx3, NAD  HEENT: normocephalic atraumatic  Psych: appropriate mood and affect  Resp: nonlabored breathing  Cardiac: Extremities WWP, RRR to peripheral palpation  Neuro: CN 2-12 grossly intact  Skin: no rashes    Spine Exam:  L1: SILT       L2: SILT      Hip flexors 4/5 Left; 4/5 Right  L3: SILT      Knee extension 4/5 Left; 4/5 Right  L4: SILT      Tib Ant. (Dorsiflexion) 3/5 Left; 4/5 Right  L5: SILT      EHL 3/5 Left; 4/5 Right  S1: SILT      Plantarflexion 3/5 Left; 4/5 Right    Babinkski: Intact  No clonus  Rectal tone and mario-anal sensation intact    A full secondary exam was performed and all relevant findings discussed and noted above.    Imaging:  MRI w L5-S1 disc herniation similar in size to L-spine MRI 9/19.    Assessment:  Orthopaedic Problems/Injuries: Chronic lumbar radiculopathy w c/f cauda equina    59F (MS w baseline BLE weakness (L>R), hx of lumbar radiculopathy 2/2 L5-S1 disc herniation) presenting w worsening RLE radicular pain and subjective weakness following mGLF. Also complains of 3w of urinary incontinence. Evaluated by Saji on 12/10 w plans for conservative treatment of disc herniation. On exam, RLE: 4/5  HF/KE/DF/EHL, 3/5 PF; LLE: 4/5 HF/KE, 3/5 EHL/DF/PF. SILT BLE. Rectal tone and perianal sensation intact. MRI w L5-S1 disc herniation similar in size to L-spine MRI 9/19.           Plan:  - No acute orthopaedic intervention  - Recommend medicine admission for PT/OT  - Patient may follow up with Dr. Lennon outpatient for management of radicular pain    Consult seen and staffed within 30 minutes of notification.    This consult was staffed with attending physician, Dr. Mcgarry.    Glenn Ochoa MD  PGY-2 Orthopaedic Surgery  On-call Resident  _________________________________________________________    This patient will be followed by the Ortho Spine Team while inpatient. See team members and contacts below:     Ortho Spine  First Call: Rossy Murphy, PGY-2  Second Call: Dalton Marley, PGY-4    Orthopedic spine attending    As noted.    Patient evaluated and imaging reviewed.  She has a small L5-S1 disc herniation.    No severe neural compression.    Would recommend a nonsurgical approach to include mobilization with therapy, consultation with pain management, and close observation.    ** Dictated with voice recognition software and not immediately reviewed for errors in grammar and/or spelling **

## 2024-12-12 NOTE — PROGRESS NOTES
Emergency Department Transition of Care Note       Signout   I received Una Gallegos in signout from Dr. Leary.  Please see the ED Provider Note for all HPI, PE and MDM up to the time of signout at 0700.  This is in addition to the primary record.    In brief Una Gallegos is an 59 y.o. female PMHx multiple sclerosis, L5-S1 herniated disc who presents to the emergency department for back pain and falls.  MRI showed L5-S1 disc herniation without cord compression.    At the time of signout we were awaiting:  Ortho spine recommendations    ED Course & Medical Decision Making   Medical Decision Making:  Discussed with orthopedics, they have no plans for operative intervention at this time, have recommended engaging PM&R if admitted.    I held a shared decision-making discussion with the patient.  She is concerned as she has 2 falls in the last few weeks.  She states that sometimes her pain makes it difficult to bend her legs, and that has caused her falls.  She is concerned about ambulating at home as she does have a home with stairs. She states she would be willing to go to rehab if this was recommended by PT/OT.    Discussed with admission coordinator and patient was admitted to medicine service.    ED Course:  ED Course as of 12/12/24 1215   Thu Dec 12, 2024   0052 Patient labs show no leukocytosis, magnesium within normal range, [TS]   0053 Creatinine elevated at 1.11 which seems to be progressive change was 11 months ago was 0.7, 2 months ago 1.03, 1 month ago 0.95 with a slowly decreasing GFR. [TS]   0402 Orthopedic surgery saw the patient states compactly benefit from operative management therefore preop labs were obtained and patient was kept NPO. [TS]   0420 EKG normal sinus rhythm, heart 92, QTc 472, no STEMI [TS]   0435 Imaging shows degenerative changes as well as disc bulge/extrusion extending into the bilateral lateral recess and contacting the S1 nerve root no cauda equina [TS]   0654 Patient  was signed out pending ortho recommendations  [TS]      ED Course User Index  [TS] Ping Leary MD         Diagnoses as of 12/12/24 1215   Lumbar disc herniation   Chronic low back pain, unspecified back pain laterality, unspecified whether sciatica present   DRAGAN (acute kidney injury) (CMS-HCC)       Disposition   As a result of their workup, the patient will require admission to the hospital.  The patient was informed of her diagnosis.  The patient was given the opportunity to ask questions and I answered them. The patient agreed to be admitted to the hospital.      Patient seen and discussed with ED attending physician.    Delaney Ashton MD  Emergency Medicine    ** Please excuse any errors in grammar or translation related to this dictation. Voice recognition software was utilized to prepare this document. **       Juanpablo Castellon MD  [facility] Emergency Medicine

## 2024-12-12 NOTE — SIGNIFICANT EVENT
Clinical findings and imaging reviewed with Dr. Mcgarry.     Smyptoms and physically exam consistent with right sided radiculopathy. No MRI findings to explain critical stenosis to explain urologic symptoms.     A/P   Right lumbar radiculopathy     No acute surgical interventions at this time   Activity as tolerated   Out Patient follow up with orthopaedic spine   Admission to be at the discrection of patient and emergency department    If admitted, can consider PM&R consult for epidural steroid injection     Plan was dicussed with attending Dr. Zeferino Marley MD  Orthopedic Surgery, PGY4  P: [phone] (Epic Chat Preferred)    Orthopaedic Spine Team     1st Call: Rossy Murphy MD (PGY2)   2nd Call: Dalton Marley MD (PGY4)   3rd Call: Ky Yang MD (Fellow)

## 2024-12-13 LAB
ATRIAL RATE: 92 BPM
GLUCOSE BLD MANUAL STRIP-MCNC: 109 MG/DL (ref 74–99)
GLUCOSE BLD MANUAL STRIP-MCNC: 118 MG/DL (ref 74–99)
GLUCOSE BLD MANUAL STRIP-MCNC: 133 MG/DL (ref 74–99)
GLUCOSE BLD MANUAL STRIP-MCNC: 97 MG/DL (ref 74–99)
P AXIS: 57 DEGREES
P OFFSET: 198 MS
P ONSET: 144 MS
PR INTERVAL: 146 MS
Q ONSET: 217 MS
QRS COUNT: 15 BEATS
QRS DURATION: 86 MS
QT INTERVAL: 382 MS
QTC CALCULATION(BAZETT): 472 MS
QTC FREDERICIA: 440 MS
R AXIS: 39 DEGREES
T AXIS: 50 DEGREES
T OFFSET: 408 MS
VENTRICULAR RATE: 92 BPM

## 2024-12-13 PROCEDURE — 97165 OT EVAL LOW COMPLEX 30 MIN: CPT | Mod: GO

## 2024-12-13 PROCEDURE — 2500000002 HC RX 250 W HCPCS SELF ADMINISTERED DRUGS (ALT 637 FOR MEDICARE OP, ALT 636 FOR OP/ED): Performed by: NURSE PRACTITIONER

## 2024-12-13 PROCEDURE — 97530 THERAPEUTIC ACTIVITIES: CPT | Mod: GP | Performed by: PHYSICAL THERAPIST

## 2024-12-13 PROCEDURE — 2500000005 HC RX 250 GENERAL PHARMACY W/O HCPCS: Performed by: NURSE PRACTITIONER

## 2024-12-13 PROCEDURE — 99232 SBSQ HOSP IP/OBS MODERATE 35: CPT | Performed by: STUDENT IN AN ORGANIZED HEALTH CARE EDUCATION/TRAINING PROGRAM

## 2024-12-13 PROCEDURE — 1210000001 HC SEMI-PRIVATE ROOM DAILY

## 2024-12-13 PROCEDURE — 2500000004 HC RX 250 GENERAL PHARMACY W/ HCPCS (ALT 636 FOR OP/ED): Performed by: NURSE PRACTITIONER

## 2024-12-13 PROCEDURE — 82947 ASSAY GLUCOSE BLOOD QUANT: CPT

## 2024-12-13 PROCEDURE — 97530 THERAPEUTIC ACTIVITIES: CPT | Mod: GO

## 2024-12-13 PROCEDURE — 97161 PT EVAL LOW COMPLEX 20 MIN: CPT | Mod: GP | Performed by: PHYSICAL THERAPIST

## 2024-12-13 PROCEDURE — 2500000001 HC RX 250 WO HCPCS SELF ADMINISTERED DRUGS (ALT 637 FOR MEDICARE OP): Performed by: NURSE PRACTITIONER

## 2024-12-13 RX ADMIN — FERROUS SULFATE TAB 325 MG (65 MG ELEMENTAL FE) 325 MG: 325 (65 FE) TAB at 08:22

## 2024-12-13 RX ADMIN — METOPROLOL SUCCINATE 50 MG: 50 TABLET, EXTENDED RELEASE ORAL at 08:21

## 2024-12-13 RX ADMIN — LEVETIRACETAM 500 MG: 500 TABLET, FILM COATED ORAL at 08:22

## 2024-12-13 RX ADMIN — OXYBUTYNIN CHLORIDE 5 MG: 5 TABLET ORAL at 22:09

## 2024-12-13 RX ADMIN — ATORVASTATIN CALCIUM 80 MG: 80 TABLET, FILM COATED ORAL at 22:09

## 2024-12-13 RX ADMIN — OXYBUTYNIN CHLORIDE 5 MG: 5 TABLET ORAL at 08:21

## 2024-12-13 RX ADMIN — METHOCARBAMOL TABLETS 750 MG: 500 TABLET, COATED ORAL at 17:15

## 2024-12-13 RX ADMIN — LEVETIRACETAM 500 MG: 500 TABLET, FILM COATED ORAL at 22:09

## 2024-12-13 RX ADMIN — OXYCODONE HYDROCHLORIDE AND ACETAMINOPHEN 1 TABLET: 5; 325 TABLET ORAL at 17:15

## 2024-12-13 RX ADMIN — PANTOPRAZOLE SODIUM 40 MG: 40 TABLET, DELAYED RELEASE ORAL at 06:00

## 2024-12-13 RX ADMIN — ACETAMINOPHEN 975 MG: 325 TABLET ORAL at 22:09

## 2024-12-13 RX ADMIN — ESCITALOPRAM OXALATE 10 MG: 10 TABLET ORAL at 08:25

## 2024-12-13 RX ADMIN — FOLIC ACID 1 MG: 1 TABLET ORAL at 08:22

## 2024-12-13 RX ADMIN — ENOXAPARIN SODIUM 40 MG: 100 INJECTION SUBCUTANEOUS at 08:30

## 2024-12-13 RX ADMIN — SENNOSIDES AND DOCUSATE SODIUM 2 TABLET: 50; 8.6 TABLET ORAL at 22:09

## 2024-12-13 RX ADMIN — OXYCODONE HYDROCHLORIDE AND ACETAMINOPHEN 1 TABLET: 5; 325 TABLET ORAL at 08:21

## 2024-12-13 RX ADMIN — SENNOSIDES AND DOCUSATE SODIUM 2 TABLET: 50; 8.6 TABLET ORAL at 08:22

## 2024-12-13 ASSESSMENT — COGNITIVE AND FUNCTIONAL STATUS - GENERAL
MOVING FROM LYING ON BACK TO SITTING ON SIDE OF FLAT BED WITH BEDRAILS: A LITTLE
DAILY ACTIVITIY SCORE: 24
TURNING FROM BACK TO SIDE WHILE IN FLAT BAD: A LOT
WALKING IN HOSPITAL ROOM: A LITTLE
PERSONAL GROOMING: A LITTLE
MOBILITY SCORE: 22
WALKING IN HOSPITAL ROOM: TOTAL
CLIMB 3 TO 5 STEPS WITH RAILING: A LITTLE
TOILETING: A LOT
STANDING UP FROM CHAIR USING ARMS: A LITTLE
CLIMB 3 TO 5 STEPS WITH RAILING: TOTAL
DRESSING REGULAR LOWER BODY CLOTHING: A LOT
HELP NEEDED FOR BATHING: A LOT
MOBILITY SCORE: 11
DAILY ACTIVITIY SCORE: 16
DRESSING REGULAR UPPER BODY CLOTHING: A LITTLE
MOVING TO AND FROM BED TO CHAIR: TOTAL

## 2024-12-13 ASSESSMENT — PAIN SCALES - GENERAL
PAINLEVEL_OUTOF10: 7
PAINLEVEL_OUTOF10: 3
PAINLEVEL_OUTOF10: 7
PAINLEVEL_OUTOF10: 0 - NO PAIN
PAINLEVEL_OUTOF10: 9
PAINLEVEL_OUTOF10: 0 - NO PAIN
PAINLEVEL_OUTOF10: 3

## 2024-12-13 ASSESSMENT — PAIN - FUNCTIONAL ASSESSMENT
PAIN_FUNCTIONAL_ASSESSMENT: 0-10

## 2024-12-13 ASSESSMENT — ACTIVITIES OF DAILY LIVING (ADL)
BATHING_ASSISTANCE: MAXIMAL
ADL_ASSISTANCE: INDEPENDENT
ADL_ASSISTANCE: INDEPENDENT

## 2024-12-13 ASSESSMENT — PAIN DESCRIPTION - DESCRIPTORS: DESCRIPTORS: ACHING;SHARP;SHOOTING

## 2024-12-13 ASSESSMENT — PAIN DESCRIPTION - LOCATION: LOCATION: LEG

## 2024-12-13 ASSESSMENT — PAIN DESCRIPTION - ORIENTATION: ORIENTATION: RIGHT;LEFT

## 2024-12-13 NOTE — PROGRESS NOTES
Met with patient at bedside, provided introduction of self and role. Patient listed as self pay, she states she currently does not have insurance. This TCC reached out to [employer] who states they have screened her and she qualifies for medicaid. PT/OT evaluations pending. Will continue to monitor for discharge planning needs.  Kirstie SANCHEZ, RN  Transitional Care Coordinator (TCC)  [phone]

## 2024-12-13 NOTE — PROGRESS NOTES
Physical Therapy    Physical Therapy Evaluation & Treatment    Patient Name: Una Gallegos  MRN: [id number]  Department: [place] [address]  Room: [address] [place] [address]  Today's Date: 12/13/2024   Time Calculation  Start Time: 1310  Stop Time: 1335  Time Calculation (min): 25 min    Assessment/Plan   PT Assessment  PT Assessment Results: Decreased strength, Decreased endurance, Impaired balance, Decreased mobility  Barriers to Participation: Comorbidities  End of Session Communication: Bedside nurse (sukhdev)  End of Session Patient Position: Bed, 3 rail up, Alarm off, not on at start of session   IP OR SWING BED PT PLAN  Inpatient or Swing Bed: Inpatient  PT Plan  Treatment/Interventions: Bed mobility, Transfer training, Gait training, Stair training, Balance training, Strengthening, Endurance training, Therapeutic exercise, Therapeutic activity, Home exercise program  PT Plan: Ongoing PT  PT Frequency: 5 times per week  PT Discharge Recommendations: High intensity level of continued care  Equipment Recommended upon Discharge: Wheeled walker  PT Recommended Transfer Status: Assist x1, Assistive device  PT - OK to Discharge: Yes      Subjective     General Visit Information:  General  Reason for Referral: presented with chronic lumbar radiculopathy. Ortho evaluated the patient and recommended no surgical intervention. presented to the ED with complaint of pain pain in her tailbone that goes down her right buttocks down the right leg behind her knee and into her foot sits times her leg does not fully extend due to this pain.  Patient also states she has had 2 falls since Sunday because of this pain she is also losing some sensation in her leg.  Past Medical History Relevant to Rehab: PMHx:MS w baseline BLE weakness (L>R), depression, HTN, HLD, T2DM, fibromyalgia, chronic pain chronic headaches, L5-S1 herniated disc  Co-Treatment: OT  Co-Treatment Reason: Cotx with OT for pt's safety with mobility  Prior to Session Communication:  Bedside nurse  Patient Position Received: Bed, 3 rail up, Alarm off, not on at start of session  Preferred Learning Style: verbal, visual  General Comment: Pt supine in bed upon arrival, pleasant and willing to participate in PT session  Home Living:  Home Living  Type of Home: House  Lives With: Adult children (Dtr (works during day))  Home Adaptive Equipment: None  Home Layout: Multi-level, 1/2 bath on main level, Stairs to alternate level with rails  Alternate Level Stairs-Number of Steps: 12  Home Access: Stairs to enter with rails  Entrance Stairs-Rails: Both (B rails apart)  Entrance Stairs-Number of Steps: 4  Bathroom Shower/Tub: Tub/shower unit  Prior Level of Function:  Prior Function Per Pt/Caregiver Report  Level of [place]: Independent with ADLs and functional transfers, Independent with homemaking with ambulation  Receives Help From: Family  ADL Assistance: Independent  Homemaking Assistance: Independent  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)  Leisure: Read and outdoor activities  Prior Function Comments: +drives, 3 falls in 1 week  Precautions:  Precautions  Medical Precautions: Fall precautions, Seizure precautions  Post-Surgical Precautions: Spinal precautions    Vital Signs (Past 2hrs)        Date/Time Vitals Session Patient Position Pulse Resp SpO2 BP MAP (mmHg)               12/13/24 1310 During PT  Standing  89  --  --  134/79  --            75                   Vital Signs Comment: BP at EOB: 126/68, 81 bpm, 100%    Objective   Pain:  Pain Assessment  Pain Assessment: 0-10  0-10 (Numeric) Pain Score: 0 - No pain  Cognition:  Cognition  Overall Cognitive Status: Within Functional Limits  Arousal/Alertness: Appropriate responses to stimuli  Orientation Level: Oriented X4  Following Commands: Follows all commands and directions without difficulty    General Assessments:       Sensation  Light Touch:  (reports n/t R LE>LLE, able to identify light  touch)    Strength  Strength Comments: BLE's 3/5 to 3+/5 based on mobility, LLE weaker       Functional Assessments:  Bed Mobility  Bed Mobility: Yes  Bed Mobility 1  Bed Mobility 1: Supine to sitting, Sitting to supine  Level of Assistance 1: Minimum assistance, Minimal verbal cues  Bed Mobility Comments 1: HOB elevated, cues for log roll    Transfers  Transfer: Yes  Transfer 1  Transfer From 1: Sit to, Stand to  Transfer to 1: Stand, Sit  Technique 1: Sit to stand, Stand to sit  Transfer Device 1: Walker  Transfer Level of Assistance 1: Minimum assistance  Trials/Comments 1: x2 from EOB, cues for safe hand placement and sequencing    Ambulation/Gait Training  Ambulation/Gait Training Performed: Yes  Ambulation/Gait Training 1  Surface 1: Level tile  Device 1: Rolling walker  Assistance 1: Minimum assistance, Minimal verbal cues  Quality of Gait 1: Decreased step length  Comments/Distance (ft) 1: 3 steps fwd and backwards and 4 sidesteps at EOB, limited by lightheadedness with mobility, improved after seated rest    Stairs  Stairs: No    Treatments:      Ambulation/Gait Training  Ambulation/Gait Training Performed: Yes  Ambulation/Gait Training 1  Surface 1: Level tile  Device 1: Rolling walker  Assistance 1: Minimum assistance, Minimal verbal cues  Quality of Gait 1: Decreased step length  Comments/Distance (ft) 1: 3 steps fwd and backwards and 4 sidesteps at EOB, limited by lightheadedness with mobility, improved after seated rest  Transfers  Transfer: Yes  Transfer 1  Transfer From 1: Sit to, Stand to  Transfer to 1: Stand, Sit  Technique 1: Sit to stand, Stand to sit  Transfer Device 1: Walker  Transfer Level of Assistance 1: Minimum assistance  Trials/Comments 1: x2 from EOB, cues for safe hand placement and sequencing    Outcome Measures:  [facility] Basic Mobility  Turning from your back to your side while in a flat bed without using bedrails: A little  Moving from lying on your back to sitting on the side of a  flat bed without using bedrails: A lot  Moving to and from bed to chair (including a wheelchair): Total  Standing up from a chair using your arms (e.g. wheelchair or bedside chair): A little  To walk in hospital room: Total  Climbing 3-5 steps with railing: Total  Basic Mobility - Total Score: 11    Encounter Problems       Encounter Problems (Active)       Balance       Pt will score >/=24/28 on Tinetti to demonstrate decreased falls risk (Progressing)       Start:  12/13/24    Expected End:  12/27/24               Mobility       Pt will be Arabella ambulation 150 ft with LRAD (Progressing)       Start:  12/13/24    Expected End:  12/27/24            Pt will be Arabella ascend/descend 12 steps with 1 handrail (Progressing)       Start:  12/13/24    Expected End:  12/27/24               PT Transfers       Pt will be Arabella with sit to stand and bed to chair transfers with LRAD (Progressing)       Start:  12/13/24    Expected End:  12/27/24            Pt will be Arabella with bed mobility (Progressing)       Start:  12/13/24    Expected End:  12/27/24               Pain - Adult              Education Documentation  Precautions, taught by Pati Gonzáles PT at 12/13/2024  3:01 PM.  Learner: Patient  Readiness: Acceptance  Method: Explanation  Response: Verbalizes Understanding, Needs Reinforcement    Body Mechanics, taught by Pati Gonzáles PT at 12/13/2024  3:01 PM.  Learner: Patient  Readiness: Acceptance  Method: Explanation  Response: Verbalizes Understanding, Needs Reinforcement    Mobility Training, taught by Pati Gonzáles PT at 12/13/2024  3:01 PM.  Learner: Patient  Readiness: Acceptance  Method: Explanation  Response: Verbalizes Understanding, Needs Reinforcement    Education Comments  No comments found.

## 2024-12-13 NOTE — CARE PLAN
Problem: Pain - Adult  Goal: Verbalizes/displays adequate comfort level or baseline comfort level  Outcome: Progressing     Problem: Safety - Adult  Goal: Free from fall injury  Outcome: Progressing     Problem: Discharge Planning  Goal: Discharge to home or other facility with appropriate resources  Outcome: Progressing     Problem: Chronic Conditions and Co-morbidities  Goal: Patient's chronic conditions and co-morbidity symptoms are monitored and maintained or improved  Outcome: Progressing     Problem: Skin  Goal: Decreased wound size/increased tissue granulation at next dressing change  Outcome: Progressing  Goal: Participates in plan/prevention/treatment measures  Outcome: Progressing  Goal: Prevent/manage excess moisture  Outcome: Progressing  Goal: Prevent/minimize sheer/friction injuries  Outcome: Progressing  Goal: Promote/optimize nutrition  Outcome: Progressing  Goal: Promote skin healing  Outcome: Progressing     Problem: Diabetes  Goal: Achieve decreasing blood glucose levels by end of shift  Outcome: Progressing  Goal: Increase stability of blood glucose readings by end of shift  Outcome: Progressing  Goal: Decrease in ketones present in urine by end of shift  Outcome: Progressing  Goal: Maintain electrolyte levels within acceptable range throughout shift  Outcome: Progressing  Goal: Maintain glucose levels >70mg/dl to <250mg/dl throughout shift  Outcome: Progressing  Goal: No changes in neurological exam by end of shift  Outcome: Progressing  Goal: Learn about and adhere to nutrition recommendations by end of shift  Outcome: Progressing  Goal: Vital signs within normal range for age by end of shift  Outcome: Progressing  Goal: Increase self care and/or family involovement by end of shift  Outcome: Progressing  Goal: Receive DSME education by end of shift  Outcome: Progressing     Problem: Fall/Injury  Goal: Not fall by end of shift  Outcome: Progressing  Goal: Be free from injury by end of the  shift  Outcome: Progressing  Goal: Verbalize understanding of personal risk factors for fall in the hospital  Outcome: Progressing  Goal: Verbalize understanding of risk factor reduction measures to prevent injury from fall in the home  Outcome: Progressing  Goal: Use assistive devices by end of the shift  Outcome: Progressing  Goal: Pace activities to prevent fatigue by end of the shift  Outcome: Progressing   The patient's goals for the shift include sleep    The clinical goals for the shift include pt will remain safe during the night

## 2024-12-13 NOTE — PROGRESS NOTES
Ms. Una Gallegos is a 59 y.o. female who is on hospital day #1 for Fall.      Assessment/Plan     Ms. Gallegos is a 60y/o lady with history of depression, HTN, HLD, T2DM, fibromyalgia, and chronic pain who presented with chronic lumbar radiculopathy. Ortho evaluated the patient and recommended no surgical intervention. PT/OT pending.      # Chronic lumbar radiculopathy  # L5-S1 disc herniation   Saw Dr. Lennon in the office who recommended conservative management with physical therapy and possible steroid injections with pain management.  - Ortho consulted, recommended conservative management  - Continue home Percocet as needed twice daily  - Continue methocarbamol 750 mg 3 times daily as needed  - PT OT consulted     # Urinary incontinence  - Continue home oxybutynin 5 mg twice daily     # Multiple sclerosis  Follows with Neurology  - Hold home Tecfidera non formulary     # Seizures  # Migaine  - Continue home keppra  - Home nurtec not ordered (nonformulary)     #T2DM  A1C 6.2%. On metformin and trulicity at home  - Insulin sliding scale     # GERD  - Continue home omeprazole as Protonix 40     # HLD  # HTN  - Continue home metoprolol  - Continue home atorvastatin     # MDD  - Continue home escitalopram    CORE MEASURES  F: no IVF   E: K>4, Mg>2  N: regular diet   P: subcutaneous lovenox  C: Full code, confirmed  NOK: John, daughter [phone]   Dispo: pending PT/OT    35 minutes were spent on patient care, chart review, and discussion with the care team and family.    Afua Shields MD      Subjective    Patient states she is feeling well and hoping to go to physical therapy to resolve her pain.    Objective    Vitals:    12/12/24 2317   BP: 119/66   Pulse: 84   Resp: 16   Temp: 35.8 °C (96.4 °F)   SpO2:         Intake/Output Summary (Last 24 hours) at 12/13/2024 1315  Last data filed at 12/13/2024 0225  Gross per 24 hour   Intake 240 ml   Output 950 ml   Net -710 ml        GEN: well-appearing, no  acute distress  HEENT: moist mucous membranes, no scleral icterus  NECK: Supple  CV: RRR, no murmurs/rubs/gallops  PULM: Breathing comfortably, clear to auscultation bilaterally  AB: Soft, non-tender, non-distended  : No in-dwelling holden  MSK: No lower extremity edema bilaterally  NEURO: A&Ox3, following commands, motor and sensory intact in BLE    LABS AND STUDIES  Relevant labs and studies independently reviewed and interpreted.    MEDICATIONS  acetaminophen, 975 mg, oral, q8h  atorvastatin, 80 mg, oral, Nightly  azelastine, 2 spray, Each Nostril, BID  enoxaparin, 40 mg, subcutaneous, q24h  ergocalciferol, 1,250 mcg, oral, q7 days  escitalopram, 10 mg, oral, Daily  ferrous sulfate (325 mg ferrous sulfate), 65 mg of iron, oral, Daily with breakfast  folic acid, 1 mg, oral, Daily  levETIRAcetam, 500 mg, oral, BID  lidocaine, 1 patch, transdermal, Daily  [Held by provider] metFORMIN, 500 mg, oral, Daily with breakfast  metoprolol succinate XL, 50 mg, oral, Daily  oxybutynin, 5 mg, oral, BID  pantoprazole, 40 mg, oral, Daily before breakfast  sennosides-docusate sodium, 2 tablet, oral, BID

## 2024-12-13 NOTE — PROGRESS NOTES
Patient was screened for medicaid and HRS accepted, medicaid pending #: [id number]. Met with patient at bedside to discuss PT recommendations for high intensity therapy. Discussed differences between acute rehab, skilled nursing facility, home health care and outpatient therapy. Patient states she would be agreeable to either acute rehab or SNF, but preference would be acute rehab. Patient aware facility would need to accept medicaid pending. Educated regarding freedom of choice and provided financial disclosure. Patient asked for referrals to be sent to [facility] and [facility] as well as a blanket referral to skilled nursing facilities that accept medicaid pending. [facility] MD updated.   Kirstie SANCHEZ, RN  Transitional Care Coordiantor (TCC)  [phone]

## 2024-12-13 NOTE — PROGRESS NOTES
Occupational Therapy    Evaluation/Treatment    Patient Name: Una Gallegos  MRN: [id number]  Department: [place] [address]  Room: [facility]339-  Today's Date: 12/13/24  Time Calculation  Start Time: 1309  Stop Time: 1335  Time Calculation (min): 26 min       Assessment:  OT Assessment: pt motivated to engage in therapy session. Pt demo good tolerance to engage in ADL tasks w Max A and functional transfers/mobility w Min A w FWW. Pt limited by fatigue, decreased endurance, strength, activity tolerance, and balance. Pt would benefit from continued OT to address these deficits.  Prognosis: Good  Barriers to Discharge Home: Caregiver assistance, Physical needs  Caregiver Assistance: Caregiver assistance needed per identified barriers - however, level of patient's required assistance exceeds assistance available at home (pt reports daughter works and is unable to assist during the day)  Physical Needs: Stair navigation into home limited by function/safety, Ambulating household distances limited by function/safety, Intermittent ADL assistance needed  Evaluation/Treatment Tolerance: Patient limited by fatigue  Medical Staff Made Aware: Yes  End of Session Communication: Bedside nurse  End of Session Patient Position: Bed, 3 rail up, Alarm off, not on at start of session  OT Assessment Results: Decreased ADL status, Decreased endurance, Decreased functional mobility, Decreased gross motor control, Decreased IADLs  Prognosis: Good  Evaluation/Treatment Tolerance: Patient limited by fatigue  Medical Staff Made Aware: Yes  Strengths: Attitude of self, Ability to acquire knowledge  Barriers to Participation: Comorbidities  Plan:  Treatment Interventions: ADL retraining, Functional transfer training, Endurance training, Patient/family training, Equipment evaluation/education, Compensatory technique education  OT Frequency: 4 times per week  OT Discharge Recommendations: High intensity level of continued care  Equipment Recommended upon  Discharge:  (TBD at next level of care)  OT Recommended Transfer Status: Minimal assist, Assist of 1  OT - OK to Discharge: Yes  Treatment Interventions: ADL retraining, Functional transfer training, Endurance training, Patient/family training, Equipment evaluation/education, Compensatory technique education    Subjective   Current Problem:  1. Lumbar disc herniation        2. Chronic low back pain, unspecified back pain laterality, unspecified whether sciatica present        3. DRAGAN (acute kidney injury) (CMS-[facility])          General:   OT Received On: 24  General  Reason for Referral: complaint of pain pain in her tailbone that goes down her right buttocks down the right leg behind her knee and into her foot sits times her leg does not fully extend due to this pain.  Patient also states she has had 2 falls since  because of this pain she is also losing some sensation in her leg  Past Medical History Relevant to Rehab: PMHx: MS, depression, HTN, HLD, T2DM, fibromyalgia, chronic pain chronic headaches. MS  Family/Caregiver Present: No  Co-Treatment: PT  Co-Treatment Reason: secondary to maximixe pt safety  Prior to Session Communication: Bedside nurse  Patient Position Received: Bed, 3 rail up, Alarm off, not on at start of session  Preferred Learning Style: auditory, verbal  General Comment: Pt supine in bed upon OT arrival. Pt was pleasant and agreeable to participate in therapy session.   Precautions:  Medical Precautions: Fall precautions  Post-Surgical Precautions: Spinal precautions    Vital Sign (Past 2hrs)        Date/Time Vitals Session Patient Position Pulse Resp SpO2 BP MAP (mmHg)    24 1309 During OT  Standing  81  --  --  134/79  --     24 1310 During PT  Standing  89  --  --  134/79  --                   Vital Signs Comment: BP at EOB: 126/68; BP in standin/79     Pain:  Pain Assessment  Pain Assessment: 0-10  0-10 (Numeric) Pain Score: 0 - No pain  Response to Interventions:   (best at rest)    Objective   Cognition:  Overall Cognitive Status: Within Functional Limits  Arousal/Alertness: Appropriate responses to stimuli  Orientation Level: Oriented X4  Following Commands: Follows all commands and directions without difficulty  Processing Speed: Within funtional limits           Home Living:  Type of Home: House  Lives With: Adult children (daughter)  Home Adaptive Equipment: None  Home Layout: Multi-level, 1/2 bath on main level, Stairs to alternate level with rails  Alternate Level Stairs-Number of Steps: 12  Home Access: Stairs to enter with rails  Entrance Stairs-Rails: Both  Entrance Stairs-Number of Steps: 4  Bathroom Shower/Tub: Tub/shower unit  Bathroom Toilet: Standard  Bathroom Equipment: Shower chair with back  Home Living Comments: pt lives w daughter who works during the day and therefore, is unable to provide assistance during the day  Prior Function:  Level of [place]: Independent with ADLs and functional transfers, Independent with homemaking with ambulation  Receives Help From: Family (able to receive help from daughter if needed)  ADL Assistance: Independent  Homemaking Assistance: Independent  Ambulatory Assistance: Independent  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)  Leisure: Read and outdoor activities  Hand Dominance: Right  IADL History:  Homemaking Responsibilities: Yes  Meal Prep Responsibility: Primary  Laundry Responsibility: Primary  Cleaning Responsibility: Primary  Bill Paying/Finance Responsibility: Primary  Shopping Responsibility: Primary  Current License: Yes  Mode of Transportation: Car  Occupation: Full time employment  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  Leisure and Hobbies: Read  ADL:  Eating Assistance: Independent (anticipated)  Grooming Assistance: Stand by (anticipated)  Grooming Deficit: Supervision/safety, Increased time to complete  Bathing Assistance: Maximal (anticipated)  Bathing Deficit:  Steadying, Verbal cueing, Supervision/safety, Increased time to complete   UE Dressing Assistance: Minimal (anticipated)  UE Dressing Deficit: Supervision/safety, Increased time to complete, Pull around back  LE Dressing Assistance: Maximal  LE Dressing Deficit: Supervision/safety, Increased time to complete, Don/doff R sock, Don/doff L sock  Toileting Assistance with Device: Maximal (anticipated)  Toileting Deficit: Supervison/safety, Increased time to complete, Clothing management up, Clothing management down  Activities of Daily Living: LE Dressing  LE Dressing: Yes  LE Dressing Where Assessed: Bed level  LE Dressing Comments: Max A don socks while supine in bed  Activity Tolerance:  Endurance: Tolerates 10 - 20 min exercise with multiple rests    Bed Mobility/Transfers: Bed Mobility  Bed Mobility: Yes  Bed Mobility 1  Bed Mobility 1: Supine to sitting, Sitting to supine  Level of Assistance 1: Minimum assistance, Minimal verbal cues  Bed Mobility Comments 1: Min A supine<>EOB w HOB elevated; Min verbal cues for log roll technique and sequence    Transfers  Transfer: Yes  Transfer 1  Transfer From 1: Sit to, Stand to  Transfer to 1: Stand, Sit  Technique 1: Sit to stand, Stand to sit  Transfer Device 1: Walker  Transfer Level of Assistance 1: Minimum assistance  Trials/Comments 1: x2 trails      Functional Mobility:  Functional Mobility  Functional Mobility Performed: Yes  Functional Mobility 1  Surface 1: Level tile  Device 1: Rolling walker  Assistance 1: Minimum assistance  Comments 1: pt completed 2-3 steps forward/backward at bedside w Min A for balance and safety w FWW, in preparation for future engagement in ADLs/IADLs  Sitting Balance:  Dynamic Sitting Balance  Dynamic Sitting-Balance Support: Feet supported  Dynamic Sitting-Level of Assistance: Contact guard  Standing Balance:  Dynamic Standing Balance  Dynamic Standing-Balance Support: Bilateral upper extremity supported  Dynamic Standing-Level of  Assistance: Minimum assistance       Therapy/Activity: Therapeutic Activity  Therapeutic Activity Performed: Yes  Therapeutic Activity 1: pt completed supine<>EOB w Min A. Pt completed sit<>stand x2 trails w Min A w FWW. Pt completed 2-3 steps forward/backward at bedside w Min A for balance and safety w FWW, in preparation for future engagement in ADLs/IADLs.  Therapeutic Activity 2: pt educated on spinal precautions- pt verbalized good understanding  Therapeutic Activity 3: pt educated on AE and compensatory strategies to complete ADL tasks while adhering to spinal precautions- pt verbalized good understanding.     Vision:Vision - Basic Assessment  Current Vision: No visual deficits  Sensation:  Sensation Comment: pt reports N/T in R leg; pt denies N/T in BUEs  Strength:  Strength Comments: BUE WFL when grossly assessed    Perception:  Inattention/Neglect: Appears intact  Coordination:  Movements are Fluid and Coordinated: Yes   Hand Function:  Hand Function  Gross Grasp: Functional  Coordination: Functional  Extremities: RUE   RUE : Within Functional Limits (when grossly assessed) and LUE   LUE: Within Functional Limits (when grossly assessed)      Outcome Measures: [facility] Daily Activity  Putting on and taking off regular lower body clothing: A lot  Bathing (including washing, rinsing, drying): A lot  Putting on and taking off regular upper body clothing: A little  Toileting, which includes using toilet, bedpan or urinal: A lot  Taking care of personal grooming such as brushing teeth: A little  Eating Meals: None  Daily Activity - Total Score: 16        Education Documentation  Body Mechanics, taught by Adelaide Currie OT at 12/13/2024  2:53 PM.  Learner: Patient  Readiness: Acceptance  Method: Explanation  Response: Verbalizes Understanding    Precautions, taught by Adelaide Currie OT at 12/13/2024  2:53 PM.  Learner: Patient  Readiness: Acceptance  Method: Explanation  Response: Verbalizes Understanding    ADL  Training, taught by Ashley Currie OT at 12/13/2024  2:53 PM.  Learner: Patient  Readiness: Acceptance  Method: Explanation  Response: Verbalizes Understanding    Education Comments  No comments found.          Goals:  Encounter Problems       Encounter Problems (Active)       ADLs       Patient with complete upper body dressing with independent level of assistance donning and doffing all UE clothes with PRN adaptive equipment while edge of bed. (Progressing)       Start:  12/13/24    Expected End:  01/03/25            Patient with complete lower body dressing with Min level of assistance donning and doffing all LE clothes  with PRN adaptive equipment while edge of bed. (Progressing)       Start:  12/13/24    Expected End:  01/03/25            Patient will complete daily grooming tasks brushing teeth and washing face/hair with stand by level of assistance and PRN adaptive equipment while edge of bed and/or standing at sink. (Progressing)       Start:  12/13/24    Expected End:  01/03/25               BALANCE       Patient will tolerate standing for 8 minutes to modified independent level of assistance with least restrictive device in order to improve functional activity tolerance for ADL tasks. (Progressing)       Start:  12/13/24    Expected End:  01/03/25               MOBILITY       Patient will perform Functional mobility mod  Household distances/Community Distances with modified independent level of assistance and least restrictive device in order to improve safety and functional mobility. (Progressing)       Start:  12/13/24    Expected End:  01/03/25 12/13/24 AT 2:59 PM ASHLEY CURRIE OT REHAB OFFICE: [phone]

## 2024-12-14 VITALS
DIASTOLIC BLOOD PRESSURE: 74 MMHG | OXYGEN SATURATION: 96 % | HEART RATE: 72 BPM | WEIGHT: 195 LBS | RESPIRATION RATE: 16 BRPM | TEMPERATURE: 97.7 F | BODY MASS INDEX: 30.61 KG/M2 | SYSTOLIC BLOOD PRESSURE: 144 MMHG | HEIGHT: 67 IN

## 2024-12-14 LAB
GLUCOSE BLD MANUAL STRIP-MCNC: 95 MG/DL (ref 74–99)
GLUCOSE BLD MANUAL STRIP-MCNC: 98 MG/DL (ref 74–99)

## 2024-12-14 PROCEDURE — 82947 ASSAY GLUCOSE BLOOD QUANT: CPT

## 2024-12-14 PROCEDURE — 2500000001 HC RX 250 WO HCPCS SELF ADMINISTERED DRUGS (ALT 637 FOR MEDICARE OP): Performed by: NURSE PRACTITIONER

## 2024-12-14 PROCEDURE — 2500000002 HC RX 250 W HCPCS SELF ADMINISTERED DRUGS (ALT 637 FOR MEDICARE OP, ALT 636 FOR OP/ED): Performed by: NURSE PRACTITIONER

## 2024-12-14 PROCEDURE — 2500000004 HC RX 250 GENERAL PHARMACY W/ HCPCS (ALT 636 FOR OP/ED): Performed by: NURSE PRACTITIONER

## 2024-12-14 PROCEDURE — 99239 HOSP IP/OBS DSCHRG MGMT >30: CPT | Performed by: STUDENT IN AN ORGANIZED HEALTH CARE EDUCATION/TRAINING PROGRAM

## 2024-12-14 RX ADMIN — LEVETIRACETAM 500 MG: 500 TABLET, FILM COATED ORAL at 09:18

## 2024-12-14 RX ADMIN — METOPROLOL SUCCINATE 50 MG: 50 TABLET, EXTENDED RELEASE ORAL at 09:18

## 2024-12-14 RX ADMIN — ESCITALOPRAM OXALATE 10 MG: 10 TABLET ORAL at 09:19

## 2024-12-14 RX ADMIN — ENOXAPARIN SODIUM 40 MG: 100 INJECTION SUBCUTANEOUS at 09:18

## 2024-12-14 RX ADMIN — OXYCODONE HYDROCHLORIDE AND ACETAMINOPHEN 1 TABLET: 5; 325 TABLET ORAL at 16:39

## 2024-12-14 RX ADMIN — PANTOPRAZOLE SODIUM 40 MG: 40 TABLET, DELAYED RELEASE ORAL at 07:03

## 2024-12-14 RX ADMIN — FOLIC ACID 1 MG: 1 TABLET ORAL at 09:19

## 2024-12-14 RX ADMIN — FERROUS SULFATE TAB 325 MG (65 MG ELEMENTAL FE) 325 MG: 325 (65 FE) TAB at 09:24

## 2024-12-14 RX ADMIN — OXYBUTYNIN CHLORIDE 5 MG: 5 TABLET ORAL at 09:19

## 2024-12-14 RX ADMIN — OXYCODONE HYDROCHLORIDE AND ACETAMINOPHEN 1 TABLET: 5; 325 TABLET ORAL at 07:07

## 2024-12-14 ASSESSMENT — PAIN SCALES - PAIN ASSESSMENT IN ADVANCED DEMENTIA (PAINAD)
FACIALEXPRESSION: SMILING OR INEXPRESSIVE
TOTALSCORE: MEDICATION (SEE MAR)
BODYLANGUAGE: RELAXED
CONSOLABILITY: NO NEED TO CONSOLE
TOTALSCORE: 0
BREATHING: NORMAL

## 2024-12-14 ASSESSMENT — COGNITIVE AND FUNCTIONAL STATUS - GENERAL
MOBILITY SCORE: 22
DAILY ACTIVITIY SCORE: 24
CLIMB 3 TO 5 STEPS WITH RAILING: A LITTLE
WALKING IN HOSPITAL ROOM: A LITTLE

## 2024-12-14 ASSESSMENT — PAIN SCALES - GENERAL
PAINLEVEL_OUTOF10: 8
PAINLEVEL_OUTOF10: 6

## 2024-12-14 ASSESSMENT — PAIN DESCRIPTION - ORIENTATION: ORIENTATION: RIGHT

## 2024-12-14 ASSESSMENT — PAIN SCALES - WONG BAKER: WONGBAKER_NUMERICALRESPONSE: HURTS LITTLE BIT

## 2024-12-14 ASSESSMENT — PAIN DESCRIPTION - LOCATION: LOCATION: KNEE

## 2024-12-14 NOTE — PROGRESS NOTES
Ms. Una Gallegos is a 59 y.o. female who is on hospital day #2 for Fall.      Assessment/Plan     Ms. Gallegos is a 60y/o lady with history of depression, HTN, HLD, T2DM, fibromyalgia, and chronic pain who presented with chronic lumbar radiculopathy. Ortho evaluated the patient and recommended no surgical intervention. PT/OT recommended high intensity rehab, currently pending placement.      # Chronic lumbar radiculopathy  # L5-S1 disc herniation   Saw Dr. Lennon in the office who recommended conservative management with physical therapy and possible steroid injections with pain management.  - Ortho consulted, recommended conservative management  - Continue home Percocet as needed twice daily  - Continue methocarbamol 750 mg 3 times daily as needed  - PT/OT     # Urinary incontinence  - Continue home oxybutynin 5 mg twice daily     # Multiple sclerosis  Follows with Neurology  - Hold home Tecfidera non formulary     # Seizures  # Migaine  - Continue home keppra  - Home nurtec not ordered (nonformulary)     #T2DM  A1C 6.2%. On metformin and trulicity at home  - Insulin sliding scale     # GERD  - Continue home omeprazole as Protonix 40     # HLD  # HTN  - Continue home metoprolol  - Continue home atorvastatin     # MDD  - Continue home escitalopram    CORE MEASURES  F: no IVF   E: K>4, Mg>2  N: regular diet   P: subcutaneous lovenox  C: Full code, confirmed  NOK: John, daughter [phone]   Dispo: pending placement    35 minutes were spent on patient care, chart review, and discussion with the care team and family.    Afua Shields MD      Subjective    Patient states she is feeling well and hoping to go to physical therapy to resolve her pain.    Objective    Vitals:    12/14/24 0539   BP: 144/74   Pulse: 72   Resp: 16   Temp: 36.5 °C (97.7 °F)   SpO2: 96%        Intake/Output Summary (Last 24 hours) at 12/14/2024 1152  Last data filed at 12/14/2024 0936  Gross per 24 hour   Intake --   Output 350 ml    Net -350 ml        GEN: well-appearing, no acute distress  HEENT: moist mucous membranes, no scleral icterus  NECK: Supple  CV: RRR, no murmurs/rubs/gallops  PULM: Breathing comfortably, clear to auscultation bilaterally  AB: Soft, non-tender, non-distended  : No in-dwelling holden  MSK: No lower extremity edema bilaterally  NEURO: A&Ox3, following commands, motor and sensory intact in BLE    LABS AND STUDIES  Relevant labs and studies independently reviewed and interpreted.    MEDICATIONS  acetaminophen, 975 mg, oral, q8h  atorvastatin, 80 mg, oral, Nightly  azelastine, 2 spray, Each Nostril, BID  enoxaparin, 40 mg, subcutaneous, q24h  ergocalciferol, 1,250 mcg, oral, q7 days  escitalopram, 10 mg, oral, Daily  ferrous sulfate (325 mg ferrous sulfate), 65 mg of iron, oral, Daily with breakfast  folic acid, 1 mg, oral, Daily  levETIRAcetam, 500 mg, oral, BID  lidocaine, 1 patch, transdermal, Daily  [Held by provider] metFORMIN, 500 mg, oral, Daily with breakfast  metoprolol succinate XL, 50 mg, oral, Daily  oxybutynin, 5 mg, oral, BID  pantoprazole, 40 mg, oral, Daily before breakfast  sennosides-docusate sodium, 2 tablet, oral, BID

## 2024-12-14 NOTE — PROGRESS NOTES
12/14/24 1400   Discharge Planning   Home or Post Acute Services Post acute facilities (Rehab/SNF/etc)   Type of Post Acute Facility Services Rehab   Expected Discharge Disposition Rehab   Does the patient need discharge transport arranged? Yes   RoundTrip coordination needed? Yes   Has discharge transport been arranged? Yes   What day is the transport expected? 12/14/24   What time is the transport expected? 1730     Patient to discharge to [facility] today. Transport arranged via [facility] stretcher for 530p. Report #: [phone]. Patient, MD, [place] MIJARES aware. Blue slip delivered to unit secretary.   Kirstie LIUN, RN  Transitional Care Coordinator (TCC)  [phone]

## 2024-12-14 NOTE — CARE PLAN
The patient's goals for the shift include sleep    The clinical goals for the shift include Pt will remain hds throughout shift.    Over the shift, the patient will continue to progress towards her care plan goals

## 2024-12-14 NOTE — CARE PLAN
Problem: Pain - Adult  Goal: Verbalizes/displays adequate comfort level or baseline comfort level  Outcome: Progressing     Problem: Safety - Adult  Goal: Free from fall injury  Outcome: Progressing     Problem: Discharge Planning  Goal: Discharge to home or other facility with appropriate resources  Outcome: Progressing     Problem: Chronic Conditions and Co-morbidities  Goal: Patient's chronic conditions and co-morbidity symptoms are monitored and maintained or improved  Outcome: Progressing   The patient's goals for the shift include sleep    The clinical goals for the shift include Patient will remain fall free this shift

## 2024-12-14 NOTE — PROGRESS NOTES
[facility] able to accept, [facility] [place] [facility] on Monday. Met with patient to provide update and she states [facility] is facility of choice. MD updated. Facility able to accept today, transport pending in roundtrip.  Kirstie LIUN, RN  Transitional Care Coordinator (TCC)  [phone]     Terbinafine Counseling: Patient counseling regarding adverse effects of terbinafine including but not limited to headache, diarrhea, rash, upset stomach, liver function test abnormalities, itching, taste/smell disturbance, nausea, abdominal pain, and flatulence.  There is a rare possibility of liver failure that can occur when taking terbinafine.  The patient understands that a baseline LFT and kidney function test may be required. The patient verbalized understanding of the proper use and possible adverse effects of terbinafine.  All of the patient's questions and concerns were addressed.

## 2024-12-15 ENCOUNTER — LAB REQUISITION (OUTPATIENT)
Dept: LAB | Facility: HOSPITAL | Age: 59
End: 2024-12-15
Payer: COMMERCIAL

## 2024-12-15 LAB
ANION GAP SERPL CALC-SCNC: 9 MMOL/L (ref 10–20)
BUN SERPL-MCNC: 14 MG/DL (ref 6–23)
CALCIUM SERPL-MCNC: 8.7 MG/DL (ref 8.6–10.3)
CHLORIDE SERPL-SCNC: 109 MMOL/L (ref 98–107)
CO2 SERPL-SCNC: 27 MMOL/L (ref 21–32)
CREAT SERPL-MCNC: 0.56 MG/DL (ref 0.5–1.05)
EGFRCR SERPLBLD CKD-EPI 2021: >90 ML/MIN/1.73M*2
ERYTHROCYTE [DISTWIDTH] IN BLOOD BY AUTOMATED COUNT: 14.1 % (ref 11.5–14.5)
GLUCOSE SERPL-MCNC: 76 MG/DL (ref 74–99)
HCT VFR BLD AUTO: 37.9 % (ref 36–46)
HGB BLD-MCNC: 11.3 G/DL (ref 12–16)
MCH RBC QN AUTO: 26.6 PG (ref 26–34)
MCHC RBC AUTO-ENTMCNC: 29.8 G/DL (ref 32–36)
MCV RBC AUTO: 89 FL (ref 80–100)
NRBC BLD-RTO: 0 /100 WBCS (ref 0–0)
PLATELET # BLD AUTO: 226 X10*3/UL (ref 150–450)
POTASSIUM SERPL-SCNC: 4.4 MMOL/L (ref 3.5–5.3)
RBC # BLD AUTO: 4.25 X10*6/UL (ref 4–5.2)
SODIUM SERPL-SCNC: 141 MMOL/L (ref 136–145)
WBC # BLD AUTO: 6.4 X10*3/UL (ref 4.4–11.3)

## 2024-12-15 PROCEDURE — 85027 COMPLETE CBC AUTOMATED: CPT

## 2024-12-15 PROCEDURE — 80048 BASIC METABOLIC PNL TOTAL CA: CPT

## 2024-12-15 NOTE — DISCHARGE SUMMARY
Discharge Diagnosis  Chronic lumbar radiculopathy  L5-S1 disc herniation  Multiple sclerosis  Seizures  Migraine  Type 2 diabetes mellitus  Hypertension      Issues Requiring Follow-Up  [ ] Continue follow-up with ortho surgery and pain management.    Test Results Pending At Discharge  Pending Labs       No current pending labs.            Hospital Course  Ms. Gallegos is a 58y/o lady with history of depression, HTN, HLD, T2DM, fibromyalgia, and chronic pain who presented with chronic lumbar radiculopathy. Patient complained of pain in her tailbone that goes down her right buttocks down the right leg behind her knee and into her foot with associated numbness.  Patient also stated she has had 2 recent falls because of this pain.  Patient states symptoms started back in September.  No urinary/fecal retention or incontinence. MRI total spine showed degenerative changes of the lumbar spine most pronounced at L5-S1 with a disc bulge/extrusion extending into the bilateral lateral recess and contacting the traversing bilateral S1 nerve roots. No significant canal stenosis was identified. No abnormal enhancement. Patient was seen by ortho while in the ED who did not recommend acute surgical interventions at this time. Physical and occupational therapy evaluated the patient and recommended high intensity rehab. She was discharged to [facility] re[facility] on 12/14/24 with counseling to schedule close outpatient ortho follow-up.    Pertinent Physical Exam At Time of Discharge  GEN: well-appearing, no acute distress  HEENT: moist mucous membranes, no scleral icterus  NECK: Supple  CV: RRR  PULM: Breathing comfortably  AB: Soft, non-tender, non-distended  : No in-dwelling holden  MSK: No lower extremity edema bilaterally  VASC: DP/PT pulses palpable bilaterally  NEURO: A&Ox3, following commands, conversational, slight weakness in the right LE compared to left, sensation intact bilaterally    Home Medications     Medication  List      CONTINUE taking these medications     acetaminophen 325 mg tablet; Commonly known as: Tylenol   albuterol 90 mcg/actuation inhaler   atorvastatin 80 mg tablet; Commonly known as: Lipitor; Take 1 tablet (80   mg) by mouth once daily at bedtime.   azelastine 137 mcg (0.1 %) nasal spray; Commonly known as: Astelin   DAILY MULTIVITAMIN ORAL   dimethyl fumarate 240 mg capsule,delayed release(DR/EC) capsule;   Commonly known as: Tecfidera; TAKE ONE (1) CAPSULE BY MOUTH TWICE DAILY.   SWALLOW WHOLE. DO NOT OPEN CAPSULE. DO NOT CRUSH OR CHEW.   ergocalciferol 1.25 MG (14088 UT) capsule; Commonly known as: Vitamin   D-2   escitalopram 10 mg tablet; Commonly known as: Lexapro; Take 1 tablet (10   mg) by mouth once daily.   ferrous sulfate (325 mg ferrous sulfate) tablet   folic acid 1 mg tablet; Commonly known as: Folvite   levETIRAcetam 500 mg tablet; Commonly known as: Keppra; Take 1 tablet   (500 mg) by mouth 2 times a day.   metFORMIN 500 mg tablet; Commonly known as: Glucophage; TAKE 1 TABLET   (500 MG) BY MOUTH ONCE DAILY WITH A MEAL.   methocarbamol 750 mg tablet; Commonly known as: Robaxin   metoprolol succinate XL 50 mg 24 hr tablet; Commonly known as: Toprol-XL   Nurtec ODT 75 mg tablet,disintegrating; Generic drug: rimegepant;   Dissolve 1 tablet (75 mg) by mouth on or under the tongue once daily as   needed at the start of a migraine. Max 1 tablet per 24 hours.   omeprazole 40 mg DR capsule; Commonly known as: PriLOSEC   oxybutynin 5 mg tablet; Commonly known as: Ditropan   oxyCODONE-acetaminophen 5-325 mg tablet; Commonly known as: Percocet   sennosides 8.6 mg tablet; Commonly known as: Senokot   Trulicity 4.5 mg/0.5 mL pen injector; Generic drug: dulaglutide     STOP taking these medications     ascorbic acid 250 MG chewable tablet; Commonly known as: Vitamin C       Outpatient Follow-Up  Future Appointments   Date Time Provider Department Center   12/20/2024  8:15 AM Arlene Burgess, PT [id number]  Academic   3/20/2025  8:30 AM Froilan Kaplan MD PhD [id number] Academic   3/20/2025  2:00 PM Silvia Molina, APRN-CNP [id number] [facility]       Afua Shields MD        >30 minutes were spent on discharge coordination and planning.

## 2024-12-15 NOTE — HOSPITAL COURSE
Ms. Gallegos is a 58y/o lady with history of depression, HTN, HLD, T2DM, fibromyalgia, and chronic pain who presented with chronic lumbar radiculopathy. Patient complained of pain in her tailbone that goes down her right buttocks down the right leg behind her knee and into her foot with associated numbness.  Patient also stated she has had 2 recent falls because of this pain.  Patient states symptoms started back in September.  No urinary/fecal retention or incontinence. MRI total spine showed degenerative changes of the lumbar spine most pronounced at L5-S1 with a disc bulge/extrusion extending into the bilateral lateral recess and contacting the traversing bilateral S1 nerve roots. No significant canal stenosis was identified. No abnormal enhancement. Patient was seen by ortho while in the ED who did not recommend acute surgical interventions at this time. Physical and occupational therapy evaluated the patient and recommended high intensity rehab. She was discharged to [facility] rehab on 12/14/24 with counseling to schedule close outpatient ortho follow-up.

## 2024-12-17 ENCOUNTER — LAB REQUISITION (OUTPATIENT)
Dept: LAB | Facility: HOSPITAL | Age: 59
End: 2024-12-17
Payer: COMMERCIAL

## 2025-03-20 ENCOUNTER — APPOINTMENT (OUTPATIENT)
Dept: OPHTHALMOLOGY | Facility: CLINIC | Age: 60
End: 2025-03-20
Payer: COMMERCIAL

## (undated) DEVICE — TOWEL, SURGICAL, NEURO, O/R, 16 X 26, BLUE, STERILE

## (undated) DEVICE — DRAPE, SHEET, THREE QUARTER, FAN FOLD, 57 X 77 IN

## (undated) DEVICE — Device

## (undated) DEVICE — ELECTRODE, ELECTROSURGICAL, BLADE, INSULATED, ENT/IMA, STERILE

## (undated) DEVICE — APPLICATOR, PREP, CHLORAPREP, W/ORANGE TINT, 10.5ML

## (undated) DEVICE — SUTURE, PDS II, 0, 18 IN, CT-1, VIOLET

## (undated) DEVICE — MANIFOLD, 4 PORT NEPTUNE STANDARD

## (undated) DEVICE — SUTURE, MONOCRYL, 2-0, 27 IN, SH/V-20 , UNDYED

## (undated) DEVICE — BANDAGE, GAUZE, CONFORMING, KERLIX, 6 PLY, 4.5 IN X 4.1 YD

## (undated) DEVICE — COVER, CART, 45 X 27 X 48 IN, CLEAR

## (undated) DEVICE — STAPLER, SKIN PROXIMATE, 35 WIDE

## (undated) DEVICE — BANDAGE, ELASTIC, MATRIX, SELF-CLOSURE, 4 IN X 5 YD, LF

## (undated) DEVICE — APPLICATOR, CHLORAPREP, W/ORANGE TINT, 26ML

## (undated) DEVICE — IRRIGATION SET, Y, LARGE BORE

## (undated) DEVICE — BANDAGE, COFLEX, 6 X 5 YDS, TAN, STERILE, LF

## (undated) DEVICE — DRAPE, SHEET, U, W/ADHESIVE STRIP, IMPERVIOUS, 60 X 70 IN, DISPOSABLE, LF, STERILE